# Patient Record
Sex: FEMALE | Race: WHITE | NOT HISPANIC OR LATINO | Employment: OTHER | URBAN - METROPOLITAN AREA
[De-identification: names, ages, dates, MRNs, and addresses within clinical notes are randomized per-mention and may not be internally consistent; named-entity substitution may affect disease eponyms.]

---

## 2017-04-13 ENCOUNTER — ALLSCRIPTS OFFICE VISIT (OUTPATIENT)
Dept: OTHER | Facility: OTHER | Age: 72
End: 2017-04-13

## 2017-04-13 DIAGNOSIS — Z12.31 ENCOUNTER FOR SCREENING MAMMOGRAM FOR MALIGNANT NEOPLASM OF BREAST: ICD-10-CM

## 2017-04-13 DIAGNOSIS — I10 ESSENTIAL (PRIMARY) HYPERTENSION: ICD-10-CM

## 2017-04-13 DIAGNOSIS — E78.5 HYPERLIPIDEMIA: ICD-10-CM

## 2017-04-13 DIAGNOSIS — E55.9 VITAMIN D DEFICIENCY: ICD-10-CM

## 2017-04-13 DIAGNOSIS — E66.9 OBESITY: ICD-10-CM

## 2017-04-13 DIAGNOSIS — E03.9 HYPOTHYROIDISM: ICD-10-CM

## 2017-04-13 DIAGNOSIS — Z13.820 ENCOUNTER FOR SCREENING FOR OSTEOPOROSIS: ICD-10-CM

## 2018-01-11 ENCOUNTER — APPOINTMENT (OUTPATIENT)
Dept: LAB | Facility: MEDICAL CENTER | Age: 73
End: 2018-01-11
Payer: MEDICARE

## 2018-01-11 DIAGNOSIS — E03.9 HYPOTHYROIDISM: ICD-10-CM

## 2018-01-11 DIAGNOSIS — I10 ESSENTIAL (PRIMARY) HYPERTENSION: ICD-10-CM

## 2018-01-11 DIAGNOSIS — E55.9 VITAMIN D DEFICIENCY: ICD-10-CM

## 2018-01-11 DIAGNOSIS — E78.5 HYPERLIPIDEMIA: ICD-10-CM

## 2018-01-11 DIAGNOSIS — E66.9 OBESITY: ICD-10-CM

## 2018-01-11 LAB
25(OH)D3 SERPL-MCNC: 30.8 NG/ML (ref 30–100)
ALBUMIN SERPL BCP-MCNC: 4.3 G/DL (ref 3.5–5)
ALP SERPL-CCNC: 85 U/L (ref 46–116)
ALT SERPL W P-5'-P-CCNC: 18 U/L (ref 12–78)
ANION GAP SERPL CALCULATED.3IONS-SCNC: 6 MMOL/L (ref 4–13)
AST SERPL W P-5'-P-CCNC: 20 U/L (ref 5–45)
BILIRUB SERPL-MCNC: 0.57 MG/DL (ref 0.2–1)
BUN SERPL-MCNC: 15 MG/DL (ref 5–25)
CALCIUM SERPL-MCNC: 9.3 MG/DL (ref 8.3–10.1)
CHLORIDE SERPL-SCNC: 106 MMOL/L (ref 100–108)
CHOLEST SERPL-MCNC: 209 MG/DL (ref 50–200)
CO2 SERPL-SCNC: 26 MMOL/L (ref 21–32)
CREAT SERPL-MCNC: 0.9 MG/DL (ref 0.6–1.3)
GFR SERPL CREATININE-BSD FRML MDRD: 63 ML/MIN/1.73SQ M
GLUCOSE P FAST SERPL-MCNC: 86 MG/DL (ref 65–99)
HDLC SERPL-MCNC: 88 MG/DL (ref 40–60)
LDLC SERPL CALC-MCNC: 109 MG/DL (ref 0–100)
POTASSIUM SERPL-SCNC: 3.9 MMOL/L (ref 3.5–5.3)
PROT SERPL-MCNC: 8.1 G/DL (ref 6.4–8.2)
SODIUM SERPL-SCNC: 138 MMOL/L (ref 136–145)
T4 FREE SERPL-MCNC: 1.21 NG/DL (ref 0.76–1.46)
TRIGL SERPL-MCNC: 62 MG/DL
TSH SERPL DL<=0.05 MIU/L-ACNC: 4.38 UIU/ML (ref 0.36–3.74)

## 2018-01-11 PROCEDURE — 84439 ASSAY OF FREE THYROXINE: CPT

## 2018-01-11 PROCEDURE — 36415 COLL VENOUS BLD VENIPUNCTURE: CPT

## 2018-01-11 PROCEDURE — 80061 LIPID PANEL: CPT

## 2018-01-11 PROCEDURE — 80053 COMPREHEN METABOLIC PANEL: CPT

## 2018-01-11 PROCEDURE — 84443 ASSAY THYROID STIM HORMONE: CPT

## 2018-01-11 PROCEDURE — 82306 VITAMIN D 25 HYDROXY: CPT

## 2018-01-13 VITALS
SYSTOLIC BLOOD PRESSURE: 138 MMHG | OXYGEN SATURATION: 96 % | HEART RATE: 78 BPM | WEIGHT: 174.4 LBS | BODY MASS INDEX: 32.09 KG/M2 | RESPIRATION RATE: 16 BRPM | TEMPERATURE: 97.1 F | DIASTOLIC BLOOD PRESSURE: 82 MMHG | HEIGHT: 62 IN

## 2018-01-13 DIAGNOSIS — E78.5 HYPERLIPIDEMIA: ICD-10-CM

## 2018-01-13 DIAGNOSIS — I10 ESSENTIAL (PRIMARY) HYPERTENSION: ICD-10-CM

## 2018-01-13 DIAGNOSIS — E03.9 HYPOTHYROIDISM: ICD-10-CM

## 2018-01-15 NOTE — RESULT NOTES
Verified Results  * MAMMO SCREENING BILATERAL W CAD [id number] 08:53AM Chelsea Watson     Test Name Result Flag Reference   MAMMO SCREENING BILATERAL W CAD (Report)     Patient History:   Patient is postmenopausal    No known family history of cancer  Benign core biopsy of the left breast  Benign excisional biopsy    of the right breast    Patient's BMI is 33 1  Reason for exam: screening (asymptomatic)  Mammo Screening Bilateral W CAD: March 30, 2016 - Check In #:    [de-identified]   Bilateral CC and MLO view(s) were taken  Technologist: RANCHO Jenkins (R)(M)   Prior study comparison: July 6, 2012, digital bilateral screening   [facility] [address] [facility]  April 27, 2010, digital bilateral screening mammogram, performed at [facility]  There are scattered fibroglandular densities  No dominant soft tissue mass, architectural distortion or    suspicious calcifications are noted  The skin and nipple    structures are within normal limits  Scattered benign appearing    calcifications are noted  No mammographic evidence of malignancy  No    significant changes when compared with prior studies  ASSESSMENT: BiRad:2 - Benign     Recommendation:   Routine screening mammogram of both breasts in 1 year  A    reminder letter will be sent  Analyzed by CAD     8-10% of cancers will be missed on mammography  Management of a    palpable abnormality must be based on clinical grounds  Patients   will be notified of their results via letter from our facility  Accredited by [employer] of [employer] and FDA  [facility] [id number]     Risk Value(s):   Tyrer-Cuzick 10 Year: 3 165%, Tyrer-Cuzick Lifetime: 4 257%,    Myriad Table: 1 5%, BRITTANY 5 Year: 3 1%, NCI Lifetime: 8 1%   Signed by:    Royer Hernandez MD   3/30/16

## 2018-01-22 ENCOUNTER — ALLSCRIPTS OFFICE VISIT (OUTPATIENT)
Dept: OTHER | Facility: OTHER | Age: 73
End: 2018-01-22

## 2018-01-22 DIAGNOSIS — Z12.31 ENCOUNTER FOR SCREENING MAMMOGRAM FOR MALIGNANT NEOPLASM OF BREAST: ICD-10-CM

## 2018-01-23 NOTE — PROGRESS NOTES
Assessment   1  Benign essential HTN (401 1) (I10)   2  Hyperlipidemia (272 4) (E78 5)   3  Hypothyroidism (244 9) (E03 9)    Plan   Benign essential HTN    · Verapamil HCl  MG Oral Tablet Extended Release; Take 1 tablet daily  Benign essential HTN, Hyperlipidemia, Hypothyroidism    · Begin or continue regular aerobic exercise  Gradually work up to at least {count1}    sessions of {dur1} of exercise a week ; Status:Complete;   Done: 66SSR1797   · Eat a low fat and low cholesterol diet ; Status:Complete;   Done: 57UDN8435   · We recommend that you bring your body mass index down to 26 ; Status:Complete;      Done: 37MIX8788   · (1) COMPREHENSIVE METABOLIC PANEL; Status:Active; Requested DPY:[id number];    · (1) LIPID PANEL, FASTING; Status:Active; Requested VRC:[id number];    · Follow-up visit in 6 months Evaluation and Treatment  Follow-up  Status: Complete     Done: 11VPS7639  Encounter for screening mammogram for malignant neoplasm of breast    · * MAMMO SCREENING BILATERAL W CAD; Status:Active; Requested for:22Jan2018;    · * MAMMO SCREENING BILATERAL W CAD; Status:Canceled; Hyperlipidemia    · Simvastatin 10 MG Oral Tablet; TAKE 1 BY MOUTH DAILY      --[address]  Screening for osteoporosis, Vitamin D deficiency    · * DXA BONE DENSITY SPINE HIP AND PELVIS; Status:Permanent Deferral - pt  declines; Discussion/Summary      Hypertension is at goal patient to continue her verapamil  Lipids are at goal patient to continue her simvastatin and low-fat diet  Exercise and weight loss encouraged  Patient has hypothyroidism not on any medication will continue to monitor TSH and T4  Prevnar 13 given today mammogram ordered  Routine eye exam recommended  Patient to follow up in 6 months labs ordered  Possible side effects of new medications were reviewed with the patient/guardian today  The treatment plan was reviewed with the patient/guardian   The patient/guardian understands and agrees with the treatment plan      Chief Complaint   blood work follow up    Patient is here today for follow up of chronic conditions described in HPI  History of Present Illness   Patient presents for follow up chronic conditions  Patient has hyperlipidemia on statin therapy  Patient has hypertension on verapamil   Patient has hypothyroidism not on any medication  Labs reviewed with patient show vitamin-D at 30 8  CMP is normal  TSH slightly elevated at 4 3  T4 is normal  Total cholesterol is 209 HDL 88  triglycerides 62  pt declines screenings of mammogram and dexa  pt over due for routine eye exam  pt will do prevnar 13 vaccine  pt stes she had flu vaccine at pharmacy      Review of Systems        Constitutional: no recent weight gain-- and-- no recent weight loss  Eyes: no eyesight problems  Cardiovascular: no chest pain,-- no palpitations-- and-- no lower extremity edema  Respiratory: no cough-- and-- no shortness of breath during exertion  Gastrointestinal: no abdominal pain,-- no constipation-- and-- no diarrhea  Genitourinary: no dysuria  Musculoskeletal: no arthralgias-- and-- no myalgias  Integumentary: no rashes  Neurological: no headache-- and-- no dizziness  Active Problems   1  Benign essential HTN (401 1) (I10)   2  Encounter for screening mammogram for malignant neoplasm of breast (V76 12)     (Z12 31)   3  Glaucoma screening (V80 1) (Z13 5)   4  Hyperlipidemia (272 4) (E78 5)   5  Hypothyroidism (244 9) (E03 9)   6  Initial Medicare annual wellness visit (V70 0) (Z00 00)   7  Medicare annual wellness visit, subsequent (V70 0) (Z00 00)   8  Need for prophylactic vaccination and inoculation against influenza (V04 81) (Z23)   9  Obesity (278 00) (E66 9)   10  Screening for genitourinary condition (V81 6) (Z13 89)   11  Screening for neurological condition (V80 09) (Z13 89)   12  Screening for osteoporosis (V82 81) (Z13 820)   13   Vitamin D deficiency (268 9) (E55 9)    Past Medical History   1  History of Achalasia (530 0) (K22 0)   2  History of Arthritis Of Hip (716 95)   3  History of Arthritis Of Knee (716 96)   4  History of Candidiasis (112 9) (B37 9)   5  History of Denial of substance abuse   6  History of Dermatitis, dyshidrotic (705 81) (L30 1)   7  History of Facial cellulitis (682 0) (L03 211)   8  History of acute conjunctivitis (V12 49) (Z86 69)   9  History of acute sinusitis (V12 69) (Z87 09)   10  Denied: History of mental disorder   11  History of Impetigo (684) (L01 00)   12  History of Menopause (V49 81)     The active problems and past medical history were reviewed and updated today  Surgical History   1  History of Biopsy Breast Open   2  History of Cataract Surgery   3  History of Hip Replacement   4  History of Knee Replacement   5  History of Tonsillectomy    Family History   Family History    1  Denied: Family history of Denial of substance abuse   2  Family history of Heart Disease (V17 49)   3  Denied: No family history of mental disorder    Social History    · Daily Coffee Consumption (___ Cups/Day)   · Daily Tea Consumption (___ Cups/Day)   · Exercise: Walking   ·    · Never A Smoker   · Stopped Drinking Alcohol   · Uses Safety Equipment - Seatbelts    Current Meds    1  Simvastatin 10 MG Oral Tablet; TAKE ONE TABLET BY MOUTH ONE TIME DAILY IN THE     EVENING; Therapy: 71Qeq1244 to (Evaluate:57Hua0724)  Requested for: 58Hpy7097; Last     Rx:57Tmr8786 Ordered   2  Verapamil HCl  MG Oral Tablet Extended Release; Take 1 tablet daily; Therapy: 09ETP5609 to (Last XL:14MQL9123)  Requested for: 96URJ2395 Ordered   3  Vitamin D 1000 UNIT CAPS; Therapy: (Recorded:42Nmy2313) to Recorded     The medication list was reviewed and updated today  Allergies   1  Aleve TABS   2  Diflucan TABS   3  EPINEPHrine LEONIDES   4   Sulfa Drugs    Vitals   Vital Signs    Recorded: 50GOW8652 10:55AM   Temperature 97 8 F, Tympanic Heart Rate 70, L Brachial Artery   Pulse Quality Normal, L Brachial Artery   Respiration Quality Normal   Respiration 16   Systolic 611, LUE, Sitting   Diastolic 82, LUE, Sitting   Height 5 ft 1 5 in   Weight 174 lb 12 8 oz   BMI Calculated 32 49   BSA Calculated 1 79   O2 Saturation 97     Physical Exam        Constitutional      General appearance: No acute distress, well appearing and well nourished  appears healthy-- and-- overweight  Head and Face      Head and face: Normal        Eyes      Conjunctiva and lids: No swelling, erythema or discharge  Pupils and irises: Equal, round, reactive to light  Ears, Nose, Mouth, and Throat      External inspection of ears and nose: Normal        Otoscopic examination: Tympanic membranes translucent with normal light reflex  Canals patent without erythema  Oropharynx: Normal with no erythema, edema, exudate or lesions  Neck      Neck: Supple, symmetric, trachea midline, no masses  Thyroid: Normal, no thyromegaly  Pulmonary      Respiratory effort: No increased work of breathing or signs of respiratory distress  Auscultation of lungs: Clear to auscultation  Cardiovascular      Auscultation of heart: Normal rate and rhythm, normal S1 and S2, no murmurs  Carotid pulses: 2+ bilaterally  Examination of extremities for edema and/or varicosities: Normal        Lymphatic      Palpation of lymph nodes in neck: No lymphadenopathy  Musculoskeletal      Digits and nails: Normal without clubbing or cyanosis  Examination of the extremities revealed no fingernail clubbing-- and-- well perfused fingers  Skin      Skin and subcutaneous tissue: Normal without rashes or lesions         Psychiatric      Judgment and insight: Normal        Mood and affect: Normal        Signatures    Electronically signed by : ENID Richardson; Jan 22 2018 11:31AM EST                       (Author)     Electronically signed by : NEO Tejeda ; Jan 22 2018 12:22PM EST                       (Author)

## 2018-09-06 DIAGNOSIS — E78.5 HYPERLIPIDEMIA, UNSPECIFIED HYPERLIPIDEMIA TYPE: Primary | ICD-10-CM

## 2018-09-07 RX ORDER — SIMVASTATIN 10 MG
TABLET ORAL
Qty: 90 TABLET | Refills: 0 | Status: SHIPPED | OUTPATIENT
Start: 2018-09-07 | End: 2019-01-08 | Stop reason: SDUPTHER

## 2019-01-07 PROBLEM — Z00.00 MEDICARE ANNUAL WELLNESS VISIT, SUBSEQUENT: Status: ACTIVE | Noted: 2019-01-07

## 2019-01-07 RX ORDER — BIOTIN 1 MG
2000 TABLET ORAL
COMMUNITY

## 2019-01-08 ENCOUNTER — OFFICE VISIT (OUTPATIENT)
Dept: FAMILY MEDICINE CLINIC | Facility: CLINIC | Age: 74
End: 2019-01-08
Payer: MEDICARE

## 2019-01-08 VITALS
BODY MASS INDEX: 30.41 KG/M2 | WEIGHT: 171.6 LBS | HEIGHT: 63 IN | HEART RATE: 78 BPM | TEMPERATURE: 97.9 F | DIASTOLIC BLOOD PRESSURE: 72 MMHG | OXYGEN SATURATION: 97 % | SYSTOLIC BLOOD PRESSURE: 120 MMHG

## 2019-01-08 DIAGNOSIS — Z12.39 SCREENING FOR BREAST CANCER: ICD-10-CM

## 2019-01-08 DIAGNOSIS — E78.5 HYPERLIPIDEMIA, UNSPECIFIED HYPERLIPIDEMIA TYPE: ICD-10-CM

## 2019-01-08 DIAGNOSIS — E03.9 HYPOTHYROIDISM, UNSPECIFIED TYPE: ICD-10-CM

## 2019-01-08 DIAGNOSIS — I10 BENIGN ESSENTIAL HTN: ICD-10-CM

## 2019-01-08 DIAGNOSIS — Z00.00 MEDICARE ANNUAL WELLNESS VISIT, SUBSEQUENT: Primary | ICD-10-CM

## 2019-01-08 PROCEDURE — 99214 OFFICE O/P EST MOD 30 MIN: CPT | Performed by: PHYSICIAN ASSISTANT

## 2019-01-08 PROCEDURE — G0439 PPPS, SUBSEQ VISIT: HCPCS | Performed by: PHYSICIAN ASSISTANT

## 2019-01-08 RX ORDER — SIMVASTATIN 10 MG
10 TABLET ORAL DAILY
Qty: 90 TABLET | Refills: 0 | Status: SHIPPED | OUTPATIENT
Start: 2019-01-08 | End: 2019-04-04 | Stop reason: SDUPTHER

## 2019-01-08 NOTE — PROGRESS NOTES
Assessment/Plan:     Diagnoses and all orders for this visit:    Medicare annual wellness visit, subsequent    Benign essential HTN  Comments:  Blood pressure is at goal continue verapamil   Orders:  -     Comprehensive metabolic panel    Hyperlipidemia, unspecified hyperlipidemia type  Comments:  Continue low-fat diet and statin therapy  Proceed with labs  Orders:  -     Lipid panel  -     simvastatin (ZOCOR) 10 mg tablet; Take 1 tablet (10 mg total) by mouth daily    Hypothyroidism, unspecified type  Comments:  Elevated TSH last year  Not on any medication recheck thyroid function  Orders:  -     TSH, 3rd generation  -     T4    Screening for breast cancer  -     Mammo screening bilateral w cad; Future    Other orders  -     Cholecalciferol (VITAMIN D3) 1000 units CAPS; Take by mouth          Subjective:      Patient ID: Donnell Nath is a 74 y o  female  Patient presents for follow up chronic conditions  Patient has hyperlipidemia she is on simvastatin 10 mg  Patient also has hypertension she is on verapamil 180 extended release once a day  Flu vaccine declined today  Discussed new shingles vaccine  Patient due for mammogram DEXA scan and routine lab work  The following portions of the patient's history were reviewed and updated as appropriate:   She  has a past medical history of Hypertension    She   Patient Active Problem List    Diagnosis Date Noted    Screening for breast cancer 01/08/2019    Medicare annual wellness visit, subsequent 01/07/2019    Hypothyroidism 01/27/2014    Obesity 11/04/2013    Benign essential HTN 06/18/2012    Hyperlipidemia 06/18/2012    Vitamin D deficiency 06/18/2012     Current Outpatient Prescriptions   Medication Sig Dispense Refill    Cholecalciferol (VITAMIN D3) 1000 units CAPS Take by mouth      simvastatin (ZOCOR) 10 mg tablet Take 1 tablet (10 mg total) by mouth daily 90 tablet 0    verapamil (COVERA HS) 180 MG (CO) 24 hr tablet Verapamil HCl  MG Oral Tablet Extended Release  Take 1 tablet daily   Quantity: 90;  Refills: 0       [facility];  Started 20-Dec-2011  Active       No current facility-administered medications for this visit  Current Outpatient Prescriptions on File Prior to Visit   Medication Sig    verapamil (COVERA HS) 180 MG (CO) 24 hr tablet Verapamil HCl  MG Oral Tablet Extended Release  Take 1 tablet daily   Quantity: 90;  Refills: 0       [facility];  Started 20-Dec-2011  Active    [DISCONTINUED] simvastatin (ZOCOR) 10 mg tablet TAKE 1 TABLET DAILY     No current facility-administered medications on file prior to visit  She is allergic to epinephrine; fluconazole; naproxen; shellfish-derived products; and sulfa antibiotics       Review of Systems   Constitutional: Negative for activity change, appetite change and unexpected weight change  HENT: Negative for ear pain and sore throat  Eyes: Negative for visual disturbance  Respiratory: Negative for cough, shortness of breath and wheezing  Cardiovascular: Negative for chest pain and leg swelling  Gastrointestinal: Negative for abdominal pain, blood in stool, constipation, diarrhea, nausea and vomiting  Genitourinary: Negative for difficulty urinating  Musculoskeletal: Negative for arthralgias and myalgias  Skin: Negative for rash  Neurological: Negative for dizziness, syncope, light-headedness and headaches  Psychiatric/Behavioral: Negative for self-injury, sleep disturbance and suicidal ideas  The patient is not nervous/anxious  Objective:        Physical Exam   Constitutional: She is oriented to person, place, and time  She appears well-developed and well-nourished  No distress  HENT:   Head: Normocephalic and atraumatic     Right Ear: Tympanic membrane, external ear and ear canal normal    Left Ear: Tympanic membrane, external ear and ear canal normal    Mouth/Throat: Oropharynx is clear and moist  No oropharyngeal exudate or posterior oropharyngeal erythema  Eyes: Pupils are equal, round, and reactive to light  Conjunctivae are normal    Neck: Normal range of motion  Neck supple  Carotid bruit is not present  No thyromegaly present  Cardiovascular: Normal rate and regular rhythm  Exam reveals no gallop and no friction rub  No murmur heard  Pulmonary/Chest: Effort normal and breath sounds normal  No respiratory distress  She has no wheezes  Abdominal: Soft  Bowel sounds are normal  She exhibits no distension and no mass  There is no tenderness  Musculoskeletal: Normal range of motion  She exhibits no edema  Lymphadenopathy:     She has no cervical adenopathy  Neurological: She is alert and oriented to person, place, and time  Skin: Skin is warm and dry  No rash noted  She is not diaphoretic  No erythema  Psychiatric: She has a normal mood and affect  Her behavior is normal  Judgment and thought content normal    Nursing note and vitals reviewed

## 2019-01-08 NOTE — PROGRESS NOTES
Assessment and Plan:    Problem List Items Addressed This Visit     None        Health Maintenance Due   Topic Date Due    Depression Screening PHQ  1945    Medicare Annual Wellness Visit (AWV)  1945    DXA SCAN  1945    DTaP,Tdap,and Td Vaccines (1 - Tdap) 06/29/1964    Fall Risk  06/29/2008    Urinary Incontinence Screening  06/29/2008    INFLUENZA VACCINE  07/01/2018         HPI:  Lashawn Vega is a 74 y o  female here for her Subsequent Wellness Visit  Patient Active Problem List   Diagnosis    Benign essential HTN    Hyperlipidemia    Hypothyroidism    Obesity    Vitamin D deficiency    Medicare annual wellness visit, subsequent     Past Medical History:   Diagnosis Date    Hypertension      Past Surgical History:   Procedure Laterality Date    TOTAL HIP ARTHROPLASTY Bilateral     TOTAL KNEE ARTHROPLASTY Right      No family history on file  History   Smoking Status    Unknown If Ever Smoked   Smokeless Tobacco    Not on file     History   Alcohol Use No      History   Drug Use No       Current Outpatient Prescriptions   Medication Sig Dispense Refill    Cholecalciferol (VITAMIN D3) 1000 units CAPS Take by mouth      simvastatin (ZOCOR) 10 mg tablet TAKE 1 TABLET DAILY 90 tablet 0    verapamil (COVERA HS) 180 MG (CO) 24 hr tablet Verapamil HCl  MG Oral Tablet Extended Release  Take 1 tablet daily   Quantity: 90;  Refills: 0       [facility];  Started 20-Dec-2011  Active       No current facility-administered medications for this visit        Allergies   Allergen Reactions    Epinephrine     Fluconazole     Naproxen     Shellfish-Derived Products     Sulfa Antibiotics      Immunization History   Administered Date(s) Administered    Influenza Split High Dose Preservative Free IM 11/04/2013    Influenza TIV (IM) 11/10/2014, 02/02/2016    Pneumococcal Conjugate 13-Valent 01/22/2018    Pneumococcal Polysaccharide PPV23 11/10/2014       Patient Care Team:  Paulino Bhardwaj PA-C as PCP - General (Family Medicine)  MD Paulino West PA-C Ron Prows, MD    Medicare Screening Tests and Risk Assessments:  Rosemarie Lai is here for her Subsequent Wellness visit  Health Risk Assessment:  Patient rates overall health as very good  Patient feels that their physical health rating is Same  Eyesight was rated as Same  Hearing was rated as Same  Patient feels that their emotional and mental health rating is Same  Pain experienced by patient in the last 7 days has been None  Patient states that she has experienced no weight loss or gain in last 6 months  Emotional/Mental Health:  Patient has been feeling nervous/anxious  PHQ-9 Depression Screening:    Frequency of the following problems over the past two weeks:      1  Little interest or pleasure in doing things: 0 - not at all      2  Feeling down, depressed, or hopeless: 0 - not at all  PHQ-2 Score: 0          Broken Bones/Falls: Fall Risk Assessment:    In the past year, patient has experienced: No history of falling in past year          Bladder/Bowel:  Patient has not leaked urine accidently in the last six months  Patient reports no loss of bowel control  Immunizations:  Patient has not had a flu vaccination within the last year  Patient has received a pneumonia shot  Patient has not received a shingles shot  Home Safety:  Patient does not have trouble with stairs inside or outside of their home  Patient currently reports that there are no safety hazards present in home, working smoke alarms, working carbon monoxide detectors  Preventative Screenings:   No breast cancer screening performed, colon cancer screen completed, no cholesterol screen completed, glaucoma eye exam completed,     Nutrition:  Current diet: Regular with servings of the following:    Medications:  Patient is currently taking over-the-counter supplements  Patient is able to manage medications  Lifestyle Choices:  Patient reports no tobacco use  Patient has not smoked or used tobacco in the past   Patient reports no alcohol use  Patient drives a vehicle  Patient wears seat belt  Activities of Daily Living:  Can get out of bed by his or her self, able to dress self, able to make own meals, able to do own shopping, able to bathe self, can do own laundry/housekeeping, can manage own money, pay bills and track expenses    Previous Hospitalizations:  No hospitalization or ED visit in past 12 months        Advanced Directives:  Patient has decided on a power of   Patient has spoken to designated power of   Patient has completed advanced directive  Preventative Screening/Counseling:      Cardiovascular:     Due for Labs/Analytes/Optional EKG: Lipid Panel          Diabetes:      Due for labs: Blood Glucose          Colorectal Cancer:      General: Patient Declines          Cervical Cancer:      General: Screening Not Indicated          Osteoporosis:      Counseling: Calcium and Vitamin D Intake and Regular Weightbearing Exercise          AAA:      General: Screening Not Indicated          Glaucoma:      General: Screening Current          HIV:      General: Screening Not Indicated          Advanced Directives:   Patient has living will for healthcare, has durable POA for healthcare, patient has an advanced directive       Immunizations:      Influenza: Patient Declines      Pneumococcal: Lifetime Vaccine Completed      Shingrix: Risks & Benefits Discussed

## 2019-01-31 ENCOUNTER — OFFICE VISIT (OUTPATIENT)
Dept: FAMILY MEDICINE CLINIC | Facility: CLINIC | Age: 74
End: 2019-01-31
Payer: MEDICARE

## 2019-01-31 VITALS
BODY MASS INDEX: 30.16 KG/M2 | HEIGHT: 63 IN | TEMPERATURE: 97.8 F | OXYGEN SATURATION: 96 % | WEIGHT: 170.2 LBS | HEART RATE: 74 BPM | SYSTOLIC BLOOD PRESSURE: 130 MMHG | DIASTOLIC BLOOD PRESSURE: 80 MMHG

## 2019-01-31 DIAGNOSIS — B02.9 HERPES ZOSTER WITHOUT COMPLICATION: Primary | ICD-10-CM

## 2019-01-31 PROCEDURE — 99213 OFFICE O/P EST LOW 20 MIN: CPT | Performed by: PHYSICIAN ASSISTANT

## 2019-01-31 RX ORDER — GABAPENTIN 100 MG/1
100 CAPSULE ORAL 2 TIMES DAILY
Qty: 60 CAPSULE | Refills: 0 | Status: SHIPPED | OUTPATIENT
Start: 2019-01-31 | End: 2019-07-08 | Stop reason: ALTCHOICE

## 2019-01-31 RX ORDER — VALACYCLOVIR HYDROCHLORIDE 1 G/1
1000 TABLET, FILM COATED ORAL 2 TIMES DAILY
Qty: 20 TABLET | Refills: 0 | Status: SHIPPED | OUTPATIENT
Start: 2019-01-31 | End: 2019-07-08 | Stop reason: ALTCHOICE

## 2019-01-31 NOTE — PROGRESS NOTES
Assessment/Plan:     Diagnoses and all orders for this visit:    Herpes zoster without complication  Comments:  Keep area clean and dry  Valtrex and gabapentin ordered  Follow up if persists or worsens  Orders:  -     valACYclovir (VALTREX) 1,000 mg tablet; Take 1 tablet (1,000 mg total) by mouth 2 (two) times a day for 10 days  -     gabapentin (NEURONTIN) 100 mg capsule; Take 1 capsule (100 mg total) by mouth 2 (two) times a day          Subjective:      Patient ID: Mike Connell is a 74 y o  female  Patient presents with the sick U rash started 3 days ago on her right lower back  Rash is spreading to her right lower abdomen  Patient denies fever or pain  The following portions of the patient's history were reviewed and updated as appropriate:   She  has a past medical history of Hypertension  She   Patient Active Problem List    Diagnosis Date Noted    Herpes zoster without complication 70/81/1969    Screening for breast cancer 01/08/2019    Medicare annual wellness visit, subsequent 01/07/2019    Hypothyroidism 01/27/2014    Obesity 11/04/2013    Benign essential HTN 06/18/2012    Hyperlipidemia 06/18/2012    Vitamin D deficiency 06/18/2012     Current Outpatient Prescriptions   Medication Sig Dispense Refill    Cholecalciferol (VITAMIN D3) 1000 units CAPS Take by mouth      gabapentin (NEURONTIN) 100 mg capsule Take 1 capsule (100 mg total) by mouth 2 (two) times a day 60 capsule 0    simvastatin (ZOCOR) 10 mg tablet Take 1 tablet (10 mg total) by mouth daily 90 tablet 0    valACYclovir (VALTREX) 1,000 mg tablet Take 1 tablet (1,000 mg total) by mouth 2 (two) times a day for 10 days 20 tablet 0    verapamil (COVERA HS) 180 MG (CO) 24 hr tablet Verapamil HCl  MG Oral Tablet Extended Release  Take 1 tablet daily   Quantity: 90;  Refills: 0       [facility];  Started 20-Dec-2011  Active       No current facility-administered medications for this visit        Current Outpatient Prescriptions on File Prior to Visit   Medication Sig    Cholecalciferol (VITAMIN D3) 1000 units CAPS Take by mouth    simvastatin (ZOCOR) 10 mg tablet Take 1 tablet (10 mg total) by mouth daily    verapamil (COVERA HS) 180 MG (CO) 24 hr tablet Verapamil HCl  MG Oral Tablet Extended Release  Take 1 tablet daily   Quantity: 90;  Refills: 0       [facility];  Started 20-Dec-2011  Active     No current facility-administered medications on file prior to visit  She is allergic to epinephrine; fluconazole; naproxen; shellfish-derived products; and sulfa antibiotics       Review of Systems   Skin: Positive for rash  Objective:        Physical Exam   Skin: Skin is warm and dry  Vesicular rash along right thoracic dermatome    Rash extends to the right and clusters of vesicles patches radiating around to the anterior abdomen

## 2019-04-04 DIAGNOSIS — E78.5 HYPERLIPIDEMIA, UNSPECIFIED HYPERLIPIDEMIA TYPE: ICD-10-CM

## 2019-04-05 RX ORDER — SIMVASTATIN 10 MG
TABLET ORAL
Qty: 90 TABLET | Refills: 0 | Status: SHIPPED | OUTPATIENT
Start: 2019-04-05 | End: 2019-07-08 | Stop reason: SDUPTHER

## 2019-05-07 DIAGNOSIS — I10 BENIGN ESSENTIAL HTN: Primary | ICD-10-CM

## 2019-07-08 ENCOUNTER — OFFICE VISIT (OUTPATIENT)
Dept: FAMILY MEDICINE CLINIC | Facility: CLINIC | Age: 74
End: 2019-07-08
Payer: MEDICARE

## 2019-07-08 VITALS
WEIGHT: 166.6 LBS | DIASTOLIC BLOOD PRESSURE: 78 MMHG | HEART RATE: 70 BPM | OXYGEN SATURATION: 96 % | HEIGHT: 63 IN | RESPIRATION RATE: 16 BRPM | BODY MASS INDEX: 29.52 KG/M2 | SYSTOLIC BLOOD PRESSURE: 120 MMHG | TEMPERATURE: 97.9 F

## 2019-07-08 DIAGNOSIS — E78.5 HYPERLIPIDEMIA, UNSPECIFIED HYPERLIPIDEMIA TYPE: ICD-10-CM

## 2019-07-08 DIAGNOSIS — Z13.820 SCREENING FOR OSTEOPOROSIS: ICD-10-CM

## 2019-07-08 DIAGNOSIS — I10 BENIGN ESSENTIAL HTN: ICD-10-CM

## 2019-07-08 DIAGNOSIS — E03.9 HYPOTHYROIDISM, UNSPECIFIED TYPE: ICD-10-CM

## 2019-07-08 DIAGNOSIS — I10 BENIGN ESSENTIAL HTN: Primary | ICD-10-CM

## 2019-07-08 PROBLEM — B02.9 HERPES ZOSTER WITHOUT COMPLICATION: Status: RESOLVED | Noted: 2019-01-31 | Resolved: 2019-07-08

## 2019-07-08 PROCEDURE — 99214 OFFICE O/P EST MOD 30 MIN: CPT | Performed by: PHYSICIAN ASSISTANT

## 2019-07-08 RX ORDER — SIMVASTATIN 10 MG
10 TABLET ORAL DAILY
Qty: 90 TABLET | Refills: 1 | Status: SHIPPED | OUTPATIENT
Start: 2019-07-08 | End: 2020-01-09 | Stop reason: SDUPTHER

## 2019-07-08 NOTE — PROGRESS NOTES
BMI Counseling: Body mass index is 29 51 kg/m²  Discussed the patient's BMI with her  The BMI is above average  BMI counseling and education was provided to the patient  Nutrition recommendations include reducing portion sizes and moderation in carbohydrate intake  Exercise recommendations include exercising 3-5 times per week  Assessment/Plan:     Diagnoses and all orders for this visit:    Benign essential HTN  Comments:  Blood pressure is at goal we will continue current regimen  Orders:  -     Comprehensive metabolic panel  -     verapamil (CALAN-SR) 180 mg CR tablet; Take 1 tablet (180 mg total) by mouth daily    Hyperlipidemia, unspecified hyperlipidemia type  Comments:  Continue statin therapy and low-fat diet  Patient instructed to add exercise to her current regimen  Orders:  -     Lipid panel  -     simvastatin (ZOCOR) 10 mg tablet; Take 1 tablet (10 mg total) by mouth daily    Hypothyroidism, unspecified type  Comments:  History of elevated TSH no current medications will check labs  Orders:  -     TSH, 3rd generation  -     T4  -     T3    Screening for osteoporosis  Comments:  Continue calcium vitamin-D and weight-bearing exercise  DEXA scan ordered  This may not be a covered service with patient's insurance  Orders:  -     DXA bone density spine hip and pelvis; Future    Hyperlipidemia, unspecified hyperlipidemia type  Comments:  Continue low-fat diet and statin therapy  Proceed with labs  Orders:  -     Lipid panel  -     simvastatin (ZOCOR) 10 mg tablet; Take 1 tablet (10 mg total) by mouth daily    Benign essential HTN  -     Comprehensive metabolic panel  -     verapamil (CALAN-SR) 180 mg CR tablet; Take 1 tablet (180 mg total) by mouth daily          Subjective:      Patient ID: Sohail Means is a 66 y o  female  Patient presents for follow up chronic conditions  Patient has a history of hypothyroidism she is not currently on any medications  We watch her TSH levels    For hypertension the patient is on verapamil 180 Sr  For lipids patient is on simvastatin 10 mg  Discussed low carb low sugar diet and adding exercise to regimen  Patient's BMI is 29  She has lost 4 lb since her last visit  Patient is also due for routine labs  The following portions of the patient's history were reviewed and updated as appropriate:   She   Patient Active Problem List    Diagnosis Date Noted    Screening for breast cancer 01/08/2019    Medicare annual wellness visit, subsequent 01/07/2019    Hypothyroidism 01/27/2014    Obesity 11/04/2013    Benign essential HTN 06/18/2012    Hyperlipidemia 06/18/2012    Vitamin D deficiency 06/18/2012     Current Outpatient Medications   Medication Sig Dispense Refill    Cholecalciferol (VITAMIN D3) 1000 units CAPS Take by mouth      simvastatin (ZOCOR) 10 mg tablet Take 1 tablet (10 mg total) by mouth daily 90 tablet 1    verapamil (CALAN-SR) 180 mg CR tablet Take 1 tablet (180 mg total) by mouth daily 90 tablet 1     No current facility-administered medications for this visit  Current Outpatient Medications on File Prior to Visit   Medication Sig    Cholecalciferol (VITAMIN D3) 1000 units CAPS Take by mouth    [DISCONTINUED] simvastatin (ZOCOR) 10 mg tablet TAKE 1 TABLET BY MOUTH EVERY DAY    [DISCONTINUED] verapamil (CALAN-SR) 180 mg CR tablet Take 1 tablet (180 mg total) by mouth daily    [DISCONTINUED] gabapentin (NEURONTIN) 100 mg capsule Take 1 capsule (100 mg total) by mouth 2 (two) times a day (Patient not taking: Reported on 7/8/2019)    [DISCONTINUED] valACYclovir (VALTREX) 1,000 mg tablet Take 1 tablet (1,000 mg total) by mouth 2 (two) times a day for 10 days     No current facility-administered medications on file prior to visit  She is allergic to epinephrine; fluconazole; naproxen; shellfish-derived products; and sulfa antibiotics       Review of Systems   Constitutional: Negative for activity change, appetite change and unexpected weight change  HENT: Negative for ear pain and sore throat  Eyes: Negative for visual disturbance  Respiratory: Negative for cough, shortness of breath and wheezing  Cardiovascular: Negative for chest pain and leg swelling  Gastrointestinal: Negative for abdominal pain, blood in stool, constipation, diarrhea, nausea and vomiting  Genitourinary: Negative for difficulty urinating  Musculoskeletal: Negative for arthralgias and myalgias  Skin: Negative for rash  Neurological: Negative for dizziness, syncope, light-headedness and headaches  Psychiatric/Behavioral: Negative for self-injury, sleep disturbance and suicidal ideas  The patient is not nervous/anxious  Objective:        Physical Exam   Constitutional: She is oriented to person, place, and time  She appears well-developed and well-nourished  No distress  HENT:   Head: Normocephalic and atraumatic  Right Ear: Tympanic membrane, external ear and ear canal normal    Left Ear: Tympanic membrane, external ear and ear canal normal    Mouth/Throat: Oropharynx is clear and moist  No oropharyngeal exudate or posterior oropharyngeal erythema  Eyes: Pupils are equal, round, and reactive to light  Conjunctivae are normal    Neck: Neck supple  Carotid bruit is not present  No thyromegaly present  Cardiovascular: Normal rate, regular rhythm and normal heart sounds  Exam reveals no gallop and no friction rub  No murmur heard  Pulmonary/Chest: Effort normal and breath sounds normal  No respiratory distress  She has no wheezes  Abdominal: Soft  Bowel sounds are normal  She exhibits no distension and no mass  There is no tenderness  Musculoskeletal: Normal range of motion  She exhibits no edema  Lymphadenopathy:     She has no cervical adenopathy  Neurological: She is alert and oriented to person, place, and time  Skin: Skin is warm and dry  No rash noted  She is not diaphoretic  No erythema     Psychiatric: She has a normal mood and affect  Her behavior is normal  Judgment and thought content normal    Nursing note and vitals reviewed

## 2019-07-12 DIAGNOSIS — E78.5 HYPERLIPIDEMIA, UNSPECIFIED HYPERLIPIDEMIA TYPE: ICD-10-CM

## 2019-07-12 RX ORDER — SIMVASTATIN 10 MG
TABLET ORAL
Qty: 90 TABLET | Refills: 0 | Status: SHIPPED | OUTPATIENT
Start: 2019-07-12 | End: 2020-01-09

## 2020-01-09 ENCOUNTER — OFFICE VISIT (OUTPATIENT)
Dept: FAMILY MEDICINE CLINIC | Facility: CLINIC | Age: 75
End: 2020-01-09
Payer: MEDICARE

## 2020-01-09 VITALS
BODY MASS INDEX: 31.01 KG/M2 | DIASTOLIC BLOOD PRESSURE: 78 MMHG | HEIGHT: 63 IN | OXYGEN SATURATION: 97 % | RESPIRATION RATE: 16 BRPM | HEART RATE: 69 BPM | TEMPERATURE: 97.5 F | SYSTOLIC BLOOD PRESSURE: 120 MMHG | WEIGHT: 175 LBS

## 2020-01-09 DIAGNOSIS — E78.5 HYPERLIPIDEMIA, UNSPECIFIED HYPERLIPIDEMIA TYPE: ICD-10-CM

## 2020-01-09 DIAGNOSIS — Z00.00 MEDICARE ANNUAL WELLNESS VISIT, SUBSEQUENT: Primary | ICD-10-CM

## 2020-01-09 DIAGNOSIS — I10 BENIGN ESSENTIAL HTN: ICD-10-CM

## 2020-01-09 DIAGNOSIS — E03.9 HYPOTHYROIDISM, UNSPECIFIED TYPE: ICD-10-CM

## 2020-01-09 DIAGNOSIS — Z12.39 SCREENING FOR BREAST CANCER: ICD-10-CM

## 2020-01-09 DIAGNOSIS — E66.09 CLASS 1 OBESITY DUE TO EXCESS CALORIES WITHOUT SERIOUS COMORBIDITY WITH BODY MASS INDEX (BMI) OF 31.0 TO 31.9 IN ADULT: ICD-10-CM

## 2020-01-09 DIAGNOSIS — Z23 ENCOUNTER FOR IMMUNIZATION: ICD-10-CM

## 2020-01-09 PROCEDURE — 99214 OFFICE O/P EST MOD 30 MIN: CPT | Performed by: PHYSICIAN ASSISTANT

## 2020-01-09 PROCEDURE — 90662 IIV NO PRSV INCREASED AG IM: CPT

## 2020-01-09 PROCEDURE — G0008 ADMIN INFLUENZA VIRUS VAC: HCPCS

## 2020-01-09 PROCEDURE — G0439 PPPS, SUBSEQ VISIT: HCPCS | Performed by: PHYSICIAN ASSISTANT

## 2020-01-09 PROCEDURE — 1123F ACP DISCUSS/DSCN MKR DOCD: CPT

## 2020-01-09 RX ORDER — SIMVASTATIN 10 MG
10 TABLET ORAL DAILY
Qty: 90 TABLET | Refills: 1 | Status: SHIPPED | OUTPATIENT
Start: 2020-01-09 | End: 2020-03-18

## 2020-01-09 NOTE — PROGRESS NOTES
Assessment/Plan:     Diagnoses and all orders for this visit:    Medicare annual wellness visit, subsequent    Hypothyroidism, unspecified type  Comments:  History of elevated TSH  No current meds  Check labs  Orders:  -     TSH, 3rd generation    Benign essential HTN  Comments:  Blood pressure is at goal continue verapamil   Orders:  -     Comprehensive metabolic panel  -     verapamil (CALAN-SR) 180 mg CR tablet; Take 1 tablet (180 mg total) by mouth daily    Hyperlipidemia, unspecified hyperlipidemia type  Comments:  Continue statin therapy and low-fat diet  Orders:  -     Lipid panel  -     simvastatin (ZOCOR) 10 mg tablet; Take 1 tablet (10 mg total) by mouth daily    Encounter for immunization  -     influenza vaccine, [id number], high-dose, PF 0 5 mL (FLUZONE HIGH-DOSE)    Class 1 obesity due to excess calories without serious comorbidity with body mass index (BMI) of 31 0 to 31 9 in adult  Comments:  Exercise encouraged to promote weight loss    Screening for breast cancer  -     Mammo screening bilateral w cad; Future    Hyperlipidemia, unspecified hyperlipidemia type  Comments:  Continue low-fat diet and statin therapy  Proceed with labs  Orders:  -     Lipid panel  -     simvastatin (ZOCOR) 10 mg tablet; Take 1 tablet (10 mg total) by mouth daily          Subjective:      Patient ID: Antonieta Mckeon is a 66 y o  female  Patient presents for follow up chronic conditions  Patient has hypertension she is on verapamil  mg  Her blood pressure is well controlled  He is also on simvastatin 10 mg for lipid control  Patient is overdue for labs  No lab since 2018  Patient has agreed to do a mammogram   Her flu vaccine was updated today          The following portions of the patient's history were reviewed and updated as appropriate:   She   Patient Active Problem List    Diagnosis Date Noted    Screening for breast cancer 01/08/2019    Medicare annual wellness visit, subsequent 01/07/2019    Hypothyroidism 01/27/2014    Obesity 11/04/2013    Benign essential HTN 06/18/2012    Hyperlipidemia 06/18/2012    Vitamin D deficiency 06/18/2012     Current Outpatient Medications   Medication Sig Dispense Refill    Cholecalciferol (VITAMIN D3) 1000 units CAPS Take by mouth      simvastatin (ZOCOR) 10 mg tablet Take 1 tablet (10 mg total) by mouth daily 90 tablet 1    verapamil (CALAN-SR) 180 mg CR tablet Take 1 tablet (180 mg total) by mouth daily 90 tablet 1     No current facility-administered medications for this visit  Current Outpatient Medications on File Prior to Visit   Medication Sig    Cholecalciferol (VITAMIN D3) 1000 units CAPS Take by mouth    [DISCONTINUED] simvastatin (ZOCOR) 10 mg tablet Take 1 tablet (10 mg total) by mouth daily    [DISCONTINUED] verapamil (CALAN-SR) 180 mg CR tablet Take 1 tablet (180 mg total) by mouth daily    [DISCONTINUED] simvastatin (ZOCOR) 10 mg tablet TAKE 1 TABLET BY MOUTH EVERY DAY     No current facility-administered medications on file prior to visit  She is allergic to epinephrine; fluconazole; naproxen; shellfish-derived products; and sulfa antibiotics       Review of Systems   Constitutional: Negative for activity change and unexpected weight change  HENT: Negative for ear pain and sore throat  Eyes: Negative for visual disturbance  Respiratory: Negative for cough, shortness of breath and wheezing  Cardiovascular: Negative for chest pain and leg swelling  Gastrointestinal: Negative for abdominal pain, blood in stool, constipation, diarrhea, nausea and vomiting  Genitourinary: Negative for difficulty urinating  Musculoskeletal: Negative for arthralgias and myalgias  Skin: Negative for rash  Neurological: Negative for dizziness, syncope, light-headedness and headaches  Psychiatric/Behavioral: Negative for self-injury, sleep disturbance and suicidal ideas  The patient is not nervous/anxious  Objective:        Physical Exam   Constitutional: She is oriented to person, place, and time  She appears well-developed and well-nourished  No distress  HENT:   Head: Normocephalic and atraumatic  Right Ear: Tympanic membrane, external ear and ear canal normal    Left Ear: Tympanic membrane, external ear and ear canal normal    Mouth/Throat: Oropharynx is clear and moist  No oropharyngeal exudate or posterior oropharyngeal erythema  Eyes: Pupils are equal, round, and reactive to light  Conjunctivae are normal    Neck: Neck supple  Carotid bruit is not present  No thyromegaly present  Cardiovascular: Normal rate, regular rhythm and normal heart sounds  Exam reveals no gallop and no friction rub  No murmur heard  Pulmonary/Chest: Effort normal and breath sounds normal  No respiratory distress  She has no wheezes  Abdominal: Soft  Bowel sounds are normal  She exhibits no distension and no mass  There is no tenderness  Musculoskeletal: She exhibits no edema  Lymphadenopathy:     She has no cervical adenopathy  Neurological: She is alert and oriented to person, place, and time  Skin: Skin is warm and dry  No rash noted  She is not diaphoretic  No erythema  Psychiatric: She has a normal mood and affect  Her behavior is normal  Judgment and thought content normal    Vitals reviewed

## 2020-01-09 NOTE — PROGRESS NOTES
Assessment and Plan:     Problem List Items Addressed This Visit        Endocrine    Hypothyroidism       Cardiovascular and Mediastinum    Benign essential HTN       Other    Hyperlipidemia    Medicare annual wellness visit, subsequent - Primary      Other Visit Diagnoses     Encounter for immunization        Relevant Orders    influenza vaccine, [id number], high-dose, PF 0 5 mL (FLUZONE HIGH-DOSE)        BMI Counseling: Body mass index is 31 kg/m²  The BMI is above normal  Nutrition recommendations include decreasing portion sizes and moderation in carbohydrate intake  Exercise recommendations include exercising 3-5 times per week  Pt  Going  To  [place] and  [facility]  For  exercise        Preventive health issues were discussed with patient, and age appropriate screening tests were ordered as noted in patient's After Visit Summary  Personalized health advice and appropriate referrals for health education or preventive services given if needed, as noted in patient's After Visit Summary  History of Present Illness:     Patient presents for Medicare Annual Wellness visit    Patient Care Team:  Syeda Lagunas PA-C as PCP - General (Family Medicine)  MD Syeda Priest PA-C Greig Russian, MD     Problem List:     Patient Active Problem List   Diagnosis    Benign essential HTN    Hyperlipidemia    Hypothyroidism    Obesity    Vitamin D deficiency    Medicare annual wellness visit, subsequent    Screening for breast cancer      Past Medical and Surgical History:     Past Medical History:   Diagnosis Date    Hypertension      Past Surgical History:   Procedure Laterality Date    TOTAL HIP ARTHROPLASTY Bilateral     TOTAL KNEE ARTHROPLASTY Right       Family History:     No family history on file     Social History:     Social History     Socioeconomic History    Marital status: /Civil Union     Spouse name: None    Number of children: None    Years of education: None    Highest education level: None   Occupational History    None   Social Needs    Financial resource strain: None    Food insecurity:     Worry: None     Inability: None    Transportation needs:     Medical: None     Non-medical: None   Tobacco Use    Smoking status: Never Smoker    Smokeless tobacco: Never Used   Substance and Sexual Activity    Alcohol use: No    Drug use: No    Sexual activity: None   Lifestyle    Physical activity:     Days per week: None     Minutes per session: None    Stress: None   Relationships    Social connections:     Talks on phone: None     Gets together: None     Attends [employer] service: None     Active member of club or organization: None     Attends meetings of clubs or organizations: None     Relationship status: None    Intimate partner violence:     Fear of current or ex partner: None     Emotionally abused: None     Physically abused: None     Forced sexual activity: None   Other Topics Concern    None   Social History Narrative    None       Medications and Allergies:     Current Outpatient Medications   Medication Sig Dispense Refill    Cholecalciferol (VITAMIN D3) 1000 units CAPS Take by mouth      simvastatin (ZOCOR) 10 mg tablet Take 1 tablet (10 mg total) by mouth daily 90 tablet 1    verapamil (CALAN-SR) 180 mg CR tablet Take 1 tablet (180 mg total) by mouth daily 90 tablet 1     No current facility-administered medications for this visit        Allergies   Allergen Reactions    Epinephrine     Fluconazole     Naproxen     Shellfish-Derived Products     Sulfa Antibiotics       Immunizations:     Immunization History   Administered Date(s) Administered    INFLUENZA 11/10/2014, 02/02/2016    Influenza Split High Dose Preservative Free IM 11/04/2013    Influenza TIV (IM) 11/10/2014, 02/02/2016    Pneumococcal Conjugate 13-Valent 01/22/2018    Pneumococcal Polysaccharide PPV23 11/10/2014      Health Maintenance:         Topic Date Due    DXA SCAN  1945    CRC Screening: Colonoscopy  09/09/2020         Topic Date Due    DTaP,Tdap,and Td Vaccines (1 - Tdap) 06/29/1956    Influenza Vaccine  07/01/2019      Medicare Health Risk Assessment:     /78 (BP Location: Right arm, Patient Position: Sitting, Cuff Size: Large)   Pulse 69   Temp 97 5 °F (36 4 °C)   Resp 16   Ht 5' 3" (1 6 m)   Wt 79 4 kg (175 lb)   SpO2 97%   BMI 31 00 kg/m²      Marissa Joel is here for her Subsequent Wellness visit  Last Medicare Wellness visit information reviewed, patient interviewed and updates made to the record today  Health Risk Assessment:   Patient rates overall health as good  Patient feels that their physical health rating is same  Eyesight was rated as same  Hearing was rated as same  Patient feels that their emotional and mental health rating is same  Pain experienced in the last 7 days has been none  Patient states that she has experienced no weight loss or gain in last 6 months  Depression Screening:   PHQ-2 Score: 0      Fall Risk Screening: In the past year, patient has experienced: no history of falling in past year      Urinary Incontinence Screening:   Patient has not leaked urine accidently in the last six months  Home Safety:  Patient does not have trouble with stairs inside or outside of their home  Patient has working smoke alarms and has working carbon monoxide detector  Home safety hazards include: none  Nutrition:   Current diet is Regular  Medications:   Patient is currently taking over-the-counter supplements  OTC medications include: see medication list  Patient is able to manage medications  Activities of Daily Living (ADLs)/Instrumental Activities of Daily Living (IADLs):   Walk and transfer into and out of bed and chair?: Yes  Dress and groom yourself?: Yes    Bathe or shower yourself?: Yes    Feed yourself?  Yes  Do your laundry/housekeeping?: Yes  Manage your money, pay your bills and track your expenses?: Yes  Make your own meals?: Yes    Do your own shopping?: Yes    Previous Hospitalizations:   Any hospitalizations or ED visits within the last 12 months?: No      Advance Care Planning:   Living will: Yes    Durable POA for healthcare: No    Advanced directive: Yes      Cognitive Screening:   Provider or family/friend/caregiver concerned regarding cognition?: Yes    PREVENTIVE SCREENINGS      Cardiovascular Screening:    General: History Lipid Disorder    Due for: Lipid Panel      Diabetes Screening:       Due for: Blood Glucose      Colorectal Cancer Screening:     General: Screening Current      Breast Cancer Screening:       Due for: Mammogram        Cervical Cancer Screening:    General: Screening Not Indicated      Osteoporosis Screening:    General: Patient Declines      Abdominal Aortic Aneurysm (AAA) Screening:        General: Screening Not Indicated      Lung Cancer Screening:     General: Screening Not Indicated      Tosha Hernandez PA-C

## 2020-01-10 ENCOUNTER — TELEPHONE (OUTPATIENT)
Dept: FAMILY MEDICINE CLINIC | Facility: CLINIC | Age: 75
End: 2020-01-10

## 2020-01-10 NOTE — TELEPHONE ENCOUNTER
Spoke with patient's   He was concerned about her possible memory loss  Patient passed her mm PI with flying colors    We will continue to observe

## 2020-01-10 NOTE — TELEPHONE ENCOUNTER
Call from patients   He would like to discuss his wife's most recent visit  He claims she will not relay to him what was discussed  There is a medical communication consent on file  He also requested you call [phone] between 10:45am-11:30am if possible

## 2020-01-14 ENCOUNTER — APPOINTMENT (OUTPATIENT)
Dept: LAB | Facility: MEDICAL CENTER | Age: 75
End: 2020-01-14
Payer: MEDICARE

## 2020-01-14 ENCOUNTER — HOSPITAL ENCOUNTER (OUTPATIENT)
Dept: RADIOLOGY | Facility: MEDICAL CENTER | Age: 75
Discharge: HOME | End: 2020-01-14
Payer: MEDICARE

## 2020-01-14 VITALS — BODY MASS INDEX: 31.01 KG/M2 | HEIGHT: 63 IN | WEIGHT: 175 LBS

## 2020-01-14 DIAGNOSIS — Z12.39 SCREENING FOR BREAST CANCER: ICD-10-CM

## 2020-01-14 DIAGNOSIS — Z12.31 VISIT FOR SCREENING MAMMOGRAM: ICD-10-CM

## 2020-01-14 LAB
ALBUMIN SERPL BCP-MCNC: 4.1 G/DL (ref 3.5–5)
ALP SERPL-CCNC: 88 U/L (ref 46–116)
ALT SERPL W P-5'-P-CCNC: 18 U/L (ref 12–78)
ANION GAP SERPL CALCULATED.3IONS-SCNC: 8 MMOL/L (ref 4–13)
AST SERPL W P-5'-P-CCNC: 15 U/L (ref 5–45)
BILIRUB SERPL-MCNC: 0.5 MG/DL (ref 0.2–1)
BUN SERPL-MCNC: 16 MG/DL (ref 5–25)
CALCIUM SERPL-MCNC: 9.4 MG/DL (ref 8.3–10.1)
CHLORIDE SERPL-SCNC: 108 MMOL/L (ref 100–108)
CHOLEST SERPL-MCNC: 223 MG/DL (ref 50–200)
CO2 SERPL-SCNC: 26 MMOL/L (ref 21–32)
CREAT SERPL-MCNC: 0.99 MG/DL (ref 0.6–1.3)
GFR SERPL CREATININE-BSD FRML MDRD: 56 ML/MIN/1.73SQ M
GLUCOSE P FAST SERPL-MCNC: 80 MG/DL (ref 65–99)
HDLC SERPL-MCNC: 99 MG/DL
LDLC SERPL CALC-MCNC: 115 MG/DL (ref 0–100)
NONHDLC SERPL-MCNC: 124 MG/DL
POTASSIUM SERPL-SCNC: 4.2 MMOL/L (ref 3.5–5.3)
PROT SERPL-MCNC: 8 G/DL (ref 6.4–8.2)
SODIUM SERPL-SCNC: 142 MMOL/L (ref 136–145)
T3 SERPL-MCNC: 1 NG/ML (ref 0.6–1.8)
T4 SERPL-MCNC: 11 UG/DL (ref 4.7–13.3)
TRIGL SERPL-MCNC: 46 MG/DL
TSH SERPL DL<=0.05 MIU/L-ACNC: 4.91 UIU/ML (ref 0.36–3.74)

## 2020-01-14 PROCEDURE — 80061 LIPID PANEL: CPT | Performed by: PHYSICIAN ASSISTANT

## 2020-01-14 PROCEDURE — 80053 COMPREHEN METABOLIC PANEL: CPT | Performed by: PHYSICIAN ASSISTANT

## 2020-01-14 PROCEDURE — 36415 COLL VENOUS BLD VENIPUNCTURE: CPT | Performed by: PHYSICIAN ASSISTANT

## 2020-01-14 PROCEDURE — 77067 SCR MAMMO BI INCL CAD: CPT

## 2020-01-14 PROCEDURE — 84443 ASSAY THYROID STIM HORMONE: CPT | Performed by: PHYSICIAN ASSISTANT

## 2020-01-14 PROCEDURE — 84436 ASSAY OF TOTAL THYROXINE: CPT | Performed by: PHYSICIAN ASSISTANT

## 2020-01-14 PROCEDURE — 84480 ASSAY TRIIODOTHYRONINE (T3): CPT | Performed by: PHYSICIAN ASSISTANT

## 2020-01-17 ENCOUNTER — TELEPHONE (OUTPATIENT)
Dept: FAMILY MEDICINE CLINIC | Facility: CLINIC | Age: 75
End: 2020-01-17

## 2020-01-17 NOTE — TELEPHONE ENCOUNTER
Spoke with patient  Her labs were reviewed CMP and lipids acceptable  TSH slightly elevated which has been the same for the last 3 or 4 years  Patient is not on thyroid replacement    Her T4 is normal   We will continue to observe no thyroid replacement meds ordered

## 2020-03-18 DIAGNOSIS — E78.5 HYPERLIPIDEMIA, UNSPECIFIED HYPERLIPIDEMIA TYPE: ICD-10-CM

## 2020-03-18 RX ORDER — SIMVASTATIN 10 MG
TABLET ORAL
Qty: 90 TABLET | Refills: 1 | Status: SHIPPED | OUTPATIENT
Start: 2020-03-18 | End: 2020-09-14

## 2020-05-11 DIAGNOSIS — I10 BENIGN ESSENTIAL HTN: ICD-10-CM

## 2020-06-22 ENCOUNTER — OFFICE VISIT (OUTPATIENT)
Dept: FAMILY MEDICINE CLINIC | Facility: CLINIC | Age: 75
End: 2020-06-22
Payer: MEDICARE

## 2020-06-22 VITALS
BODY MASS INDEX: 31.36 KG/M2 | RESPIRATION RATE: 16 BRPM | TEMPERATURE: 97.2 F | WEIGHT: 177 LBS | DIASTOLIC BLOOD PRESSURE: 90 MMHG | OXYGEN SATURATION: 96 % | HEIGHT: 63 IN | HEART RATE: 108 BPM | SYSTOLIC BLOOD PRESSURE: 150 MMHG
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
## 2020-06-22 DIAGNOSIS — Z23 ENCOUNTER FOR IMMUNIZATION: ICD-10-CM

## 2020-06-22 DIAGNOSIS — S51.831A PUNCTURE WOUND OF RIGHT FOREARM, INITIAL ENCOUNTER: ICD-10-CM

## 2020-06-22 PROCEDURE — 99214 OFFICE O/P EST MOD 30 MIN: CPT | Performed by: PHYSICIAN ASSISTANT

## 2020-06-22 PROCEDURE — 4040F PNEUMOC VAC/ADMIN/RCVD: CPT | Performed by: PHYSICIAN ASSISTANT

## 2020-06-22 PROCEDURE — 90715 TDAP VACCINE 7 YRS/> IM: CPT

## 2020-06-22 PROCEDURE — 90471 IMMUNIZATION ADMIN: CPT

## 2020-06-22 PROCEDURE — 3077F SYST BP >= 140 MM HG: CPT | Performed by: PHYSICIAN ASSISTANT

## 2020-06-22 PROCEDURE — 1160F RVW MEDS BY RX/DR IN RCRD: CPT | Performed by: PHYSICIAN ASSISTANT

## 2020-06-22 PROCEDURE — 3080F DIAST BP >= 90 MM HG: CPT | Performed by: PHYSICIAN ASSISTANT

## 2020-06-22 PROCEDURE — 1036F TOBACCO NON-USER: CPT | Performed by: PHYSICIAN ASSISTANT

## 2020-06-22 RX ORDER — AMOXICILLIN AND CLAVULANATE POTASSIUM 875; 125 MG/1; MG/1
1 TABLET, FILM COATED ORAL EVERY 12 HOURS SCHEDULED
Qty: 14 TABLET | Refills: 0 | Status: SHIPPED | OUTPATIENT
Start: 2020-06-22 | End: 2020-06-29

## 2020-09-14 DIAGNOSIS — E78.5 HYPERLIPIDEMIA, UNSPECIFIED HYPERLIPIDEMIA TYPE: ICD-10-CM

## 2020-09-14 RX ORDER — SIMVASTATIN 10 MG
TABLET ORAL
Qty: 90 TABLET | Refills: 1 | Status: SHIPPED | OUTPATIENT
Start: 2020-09-14 | End: 2021-12-23 | Stop reason: SDUPTHER

## 2021-01-22 DIAGNOSIS — I10 BENIGN ESSENTIAL HTN: ICD-10-CM

## 2021-02-09 DIAGNOSIS — Z23 ENCOUNTER FOR IMMUNIZATION: ICD-10-CM

## 2021-02-12 ENCOUNTER — IMMUNIZATIONS (OUTPATIENT)
Dept: FAMILY MEDICINE CLINIC | Facility: HOSPITAL | Age: 76
End: 2021-02-12

## 2021-02-12 DIAGNOSIS — Z23 ENCOUNTER FOR IMMUNIZATION: Primary | ICD-10-CM

## 2021-02-12 PROCEDURE — 0001A SARS-COV-2 / COVID-19 MRNA VACCINE (PFIZER-BIONTECH) 30 MCG: CPT

## 2021-02-12 PROCEDURE — 91300 SARS-COV-2 / COVID-19 MRNA VACCINE (PFIZER-BIONTECH) 30 MCG: CPT

## 2021-03-04 ENCOUNTER — IMMUNIZATIONS (OUTPATIENT)
Dept: FAMILY MEDICINE CLINIC | Facility: HOSPITAL | Age: 76
End: 2021-03-04

## 2021-03-04 DIAGNOSIS — Z23 ENCOUNTER FOR IMMUNIZATION: Primary | ICD-10-CM

## 2021-03-04 PROCEDURE — 91300 SARS-COV-2 / COVID-19 MRNA VACCINE (PFIZER-BIONTECH) 30 MCG: CPT

## 2021-03-04 PROCEDURE — 0002A SARS-COV-2 / COVID-19 MRNA VACCINE (PFIZER-BIONTECH) 30 MCG: CPT

## 2021-10-22 PROCEDURE — U0005 INFEC AGEN DETEC AMPLI PROBE: HCPCS | Performed by: PHYSICIAN ASSISTANT

## 2021-10-22 PROCEDURE — U0003 INFECTIOUS AGENT DETECTION BY NUCLEIC ACID (DNA OR RNA); SEVERE ACUTE RESPIRATORY SYNDROME CORONAVIRUS 2 (SARS-COV-2) (CORONAVIRUS DISEASE [COVID-19]), AMPLIFIED PROBE TECHNIQUE, MAKING USE OF HIGH THROUGHPUT TECHNOLOGIES AS DESCRIBED BY CMS-2020-01-R: HCPCS | Performed by: PHYSICIAN ASSISTANT

## 2021-10-26 ENCOUNTER — TELEMEDICINE (OUTPATIENT)
Dept: FAMILY MEDICINE CLINIC | Facility: CLINIC | Age: 76
End: 2021-10-26
Payer: MEDICARE

## 2021-10-26 DIAGNOSIS — U07.1 COVID-19: Primary | ICD-10-CM

## 2021-10-26 PROCEDURE — G2012 BRIEF CHECK IN BY MD/QHP: HCPCS | Performed by: PHYSICIAN ASSISTANT

## 2021-11-03 DIAGNOSIS — I10 BENIGN ESSENTIAL HTN: ICD-10-CM

## 2021-12-23 DIAGNOSIS — E78.5 HYPERLIPIDEMIA, UNSPECIFIED HYPERLIPIDEMIA TYPE: ICD-10-CM

## 2021-12-23 RX ORDER — SIMVASTATIN 10 MG
10 TABLET ORAL DAILY
Qty: 90 TABLET | Refills: 0 | Status: SHIPPED | OUTPATIENT
Start: 2021-12-23 | End: 2022-04-06 | Stop reason: SDUPTHER

## 2021-12-28 DIAGNOSIS — I10 BENIGN ESSENTIAL HTN: ICD-10-CM

## 2022-02-08 ENCOUNTER — TELEPHONE (OUTPATIENT)
Dept: FAMILY MEDICINE CLINIC | Facility: CLINIC | Age: 77
End: 2022-02-08

## 2022-04-05 ENCOUNTER — TELEPHONE (OUTPATIENT)
Dept: FAMILY MEDICINE CLINIC | Facility: CLINIC | Age: 77
End: 2022-04-05

## 2022-04-05 DIAGNOSIS — E55.9 VITAMIN D DEFICIENCY: ICD-10-CM

## 2022-04-05 DIAGNOSIS — I10 BENIGN ESSENTIAL HTN: Primary | ICD-10-CM

## 2022-04-05 DIAGNOSIS — F03.90 DEMENTIA WITHOUT BEHAVIORAL DISTURBANCE, UNSPECIFIED DEMENTIA TYPE (HCC): ICD-10-CM

## 2022-04-05 DIAGNOSIS — E03.9 ACQUIRED HYPOTHYROIDISM: ICD-10-CM

## 2022-04-05 DIAGNOSIS — E78.2 MIXED HYPERLIPIDEMIA: ICD-10-CM

## 2022-04-05 DIAGNOSIS — E78.5 HYPERLIPIDEMIA, UNSPECIFIED HYPERLIPIDEMIA TYPE: ICD-10-CM

## 2022-04-05 RX ORDER — SIMVASTATIN 10 MG
10 TABLET ORAL DAILY
Qty: 90 TABLET | Refills: 0 | Status: CANCELLED | OUTPATIENT
Start: 2022-04-05

## 2022-04-05 NOTE — TELEPHONE ENCOUNTER
Patient's spouse came in asking for labs to be ordered before coming in for an appt  Pt has dementia  Requested to see Dr MILLER  Please advise  Also asked for 30 day supply of Simvastatin to [facility] in [place]

## 2022-04-06 DIAGNOSIS — E78.5 HYPERLIPIDEMIA, UNSPECIFIED HYPERLIPIDEMIA TYPE: ICD-10-CM

## 2022-04-06 RX ORDER — SIMVASTATIN 10 MG
10 TABLET ORAL DAILY
Qty: 90 TABLET | Refills: 0 | Status: SHIPPED | OUTPATIENT
Start: 2022-04-06 | End: 2022-04-08 | Stop reason: SDUPTHER

## 2022-04-08 DIAGNOSIS — E78.5 HYPERLIPIDEMIA, UNSPECIFIED HYPERLIPIDEMIA TYPE: ICD-10-CM

## 2022-04-08 RX ORDER — SIMVASTATIN 10 MG
10 TABLET ORAL DAILY
Qty: 30 TABLET | Refills: 0 | Status: SHIPPED | OUTPATIENT
Start: 2022-04-08 | End: 2022-04-28 | Stop reason: SDUPTHER

## 2022-04-11 ENCOUNTER — APPOINTMENT (OUTPATIENT)
Dept: LAB | Facility: MEDICAL CENTER | Age: 77
End: 2022-04-11
Payer: COMMERCIAL

## 2022-04-11 DIAGNOSIS — F03.90 DEMENTIA WITHOUT BEHAVIORAL DISTURBANCE, UNSPECIFIED DEMENTIA TYPE (HCC): ICD-10-CM

## 2022-04-11 DIAGNOSIS — I10 BENIGN ESSENTIAL HTN: ICD-10-CM

## 2022-04-11 DIAGNOSIS — E78.2 MIXED HYPERLIPIDEMIA: ICD-10-CM

## 2022-04-11 DIAGNOSIS — E03.9 ACQUIRED HYPOTHYROIDISM: ICD-10-CM

## 2022-04-11 DIAGNOSIS — E55.9 VITAMIN D DEFICIENCY: ICD-10-CM

## 2022-04-11 LAB
25(OH)D3 SERPL-MCNC: 43.1 NG/ML (ref 30–100)
ALBUMIN SERPL BCP-MCNC: 3.9 G/DL (ref 3.5–5)
ALP SERPL-CCNC: 82 U/L (ref 46–116)
ALT SERPL W P-5'-P-CCNC: 17 U/L (ref 12–78)
ANION GAP SERPL CALCULATED.3IONS-SCNC: 8 MMOL/L (ref 4–13)
AST SERPL W P-5'-P-CCNC: 16 U/L (ref 5–45)
BASOPHILS # BLD AUTO: 0.04 THOUSANDS/ΜL (ref 0–0.1)
BASOPHILS NFR BLD AUTO: 1 % (ref 0–1)
BILIRUB SERPL-MCNC: 0.45 MG/DL (ref 0.2–1)
BUN SERPL-MCNC: 22 MG/DL (ref 5–25)
CALCIUM SERPL-MCNC: 9.6 MG/DL (ref 8.3–10.1)
CHLORIDE SERPL-SCNC: 107 MMOL/L (ref 100–108)
CHOLEST SERPL-MCNC: 196 MG/DL
CO2 SERPL-SCNC: 26 MMOL/L (ref 21–32)
CREAT SERPL-MCNC: 1.16 MG/DL (ref 0.6–1.3)
EOSINOPHIL # BLD AUTO: 0.18 THOUSAND/ΜL (ref 0–0.61)
EOSINOPHIL NFR BLD AUTO: 3 % (ref 0–6)
ERYTHROCYTE [DISTWIDTH] IN BLOOD BY AUTOMATED COUNT: 14.1 % (ref 11.6–15.1)
GFR SERPL CREATININE-BSD FRML MDRD: 45 ML/MIN/1.73SQ M
GLUCOSE P FAST SERPL-MCNC: 88 MG/DL (ref 65–99)
HCT VFR BLD AUTO: 40.7 % (ref 34.8–46.1)
HDLC SERPL-MCNC: 79 MG/DL
HGB BLD-MCNC: 13.6 G/DL (ref 11.5–15.4)
IMM GRANULOCYTES # BLD AUTO: 0.02 THOUSAND/UL (ref 0–0.2)
IMM GRANULOCYTES NFR BLD AUTO: 0 % (ref 0–2)
LDLC SERPL CALC-MCNC: 105 MG/DL (ref 0–100)
LYMPHOCYTES # BLD AUTO: 1.74 THOUSANDS/ΜL (ref 0.6–4.47)
LYMPHOCYTES NFR BLD AUTO: 31 % (ref 14–44)
MCH RBC QN AUTO: 29.4 PG (ref 26.8–34.3)
MCHC RBC AUTO-ENTMCNC: 33.4 G/DL (ref 31.4–37.4)
MCV RBC AUTO: 88 FL (ref 82–98)
MONOCYTES # BLD AUTO: 0.52 THOUSAND/ΜL (ref 0.17–1.22)
MONOCYTES NFR BLD AUTO: 9 % (ref 4–12)
NEUTROPHILS # BLD AUTO: 3.04 THOUSANDS/ΜL (ref 1.85–7.62)
NEUTS SEG NFR BLD AUTO: 56 % (ref 43–75)
NONHDLC SERPL-MCNC: 117 MG/DL
NRBC BLD AUTO-RTO: 0 /100 WBCS
PLATELET # BLD AUTO: 254 THOUSANDS/UL (ref 149–390)
PMV BLD AUTO: 9.9 FL (ref 8.9–12.7)
POTASSIUM SERPL-SCNC: 4.4 MMOL/L (ref 3.5–5.3)
PROT SERPL-MCNC: 8.3 G/DL (ref 6.4–8.2)
RBC # BLD AUTO: 4.62 MILLION/UL (ref 3.81–5.12)
SODIUM SERPL-SCNC: 141 MMOL/L (ref 136–145)
TRIGL SERPL-MCNC: 61 MG/DL
TSH SERPL DL<=0.05 MIU/L-ACNC: 7.05 UIU/ML (ref 0.45–4.5)
WBC # BLD AUTO: 5.54 THOUSAND/UL (ref 4.31–10.16)

## 2022-04-11 PROCEDURE — 36415 COLL VENOUS BLD VENIPUNCTURE: CPT

## 2022-04-11 PROCEDURE — 82306 VITAMIN D 25 HYDROXY: CPT

## 2022-04-11 PROCEDURE — 80053 COMPREHEN METABOLIC PANEL: CPT

## 2022-04-11 PROCEDURE — 80061 LIPID PANEL: CPT

## 2022-04-11 PROCEDURE — 85025 COMPLETE CBC W/AUTO DIFF WBC: CPT

## 2022-04-11 PROCEDURE — 84443 ASSAY THYROID STIM HORMONE: CPT

## 2022-04-28 ENCOUNTER — OFFICE VISIT (OUTPATIENT)
Dept: FAMILY MEDICINE CLINIC | Facility: CLINIC | Age: 77
End: 2022-04-28
Payer: COMMERCIAL

## 2022-04-28 VITALS
SYSTOLIC BLOOD PRESSURE: 142 MMHG | BODY MASS INDEX: 35.22 KG/M2 | WEIGHT: 198.8 LBS | HEART RATE: 68 BPM | OXYGEN SATURATION: 98 % | TEMPERATURE: 98.6 F | DIASTOLIC BLOOD PRESSURE: 76 MMHG | HEIGHT: 63 IN

## 2022-04-28 DIAGNOSIS — Z00.00 MEDICARE ANNUAL WELLNESS VISIT, SUBSEQUENT: Primary | ICD-10-CM

## 2022-04-28 DIAGNOSIS — E03.9 ACQUIRED HYPOTHYROIDISM: ICD-10-CM

## 2022-04-28 DIAGNOSIS — F03.90 DEMENTIA WITHOUT BEHAVIORAL DISTURBANCE, UNSPECIFIED DEMENTIA TYPE (HCC): ICD-10-CM

## 2022-04-28 DIAGNOSIS — E78.2 MIXED HYPERLIPIDEMIA: ICD-10-CM

## 2022-04-28 DIAGNOSIS — I10 BENIGN ESSENTIAL HTN: ICD-10-CM

## 2022-04-28 PROCEDURE — 1125F AMNT PAIN NOTED PAIN PRSNT: CPT | Performed by: FAMILY MEDICINE

## 2022-04-28 PROCEDURE — 1160F RVW MEDS BY RX/DR IN RCRD: CPT | Performed by: FAMILY MEDICINE

## 2022-04-28 PROCEDURE — 99214 OFFICE O/P EST MOD 30 MIN: CPT | Performed by: FAMILY MEDICINE

## 2022-04-28 PROCEDURE — 1003F LEVEL OF ACTIVITY ASSESS: CPT | Performed by: FAMILY MEDICINE

## 2022-04-28 PROCEDURE — 1170F FXNL STATUS ASSESSED: CPT | Performed by: FAMILY MEDICINE

## 2022-04-28 PROCEDURE — G0439 PPPS, SUBSEQ VISIT: HCPCS | Performed by: FAMILY MEDICINE

## 2022-04-28 PROCEDURE — 3288F FALL RISK ASSESSMENT DOCD: CPT | Performed by: FAMILY MEDICINE

## 2022-04-28 PROCEDURE — 3725F SCREEN DEPRESSION PERFORMED: CPT | Performed by: FAMILY MEDICINE

## 2022-04-28 PROCEDURE — 1090F PRES/ABSN URINE INCON ASSESS: CPT | Performed by: FAMILY MEDICINE

## 2022-04-28 PROCEDURE — 1036F TOBACCO NON-USER: CPT | Performed by: FAMILY MEDICINE

## 2022-04-28 RX ORDER — LEVOTHYROXINE SODIUM 0.03 MG/1
25 TABLET ORAL
Qty: 30 TABLET | Refills: 1 | Status: SHIPPED | OUTPATIENT
Start: 2022-04-28 | End: 2022-06-24 | Stop reason: SDUPTHER

## 2022-04-28 RX ORDER — SIMVASTATIN 10 MG
10 TABLET ORAL DAILY
Qty: 90 TABLET | Refills: 1 | Status: SHIPPED | OUTPATIENT
Start: 2022-04-28

## 2022-04-28 NOTE — PROGRESS NOTES
Assessment and Plan:     Problem List Items Addressed This Visit        Cardiovascular and Mediastinum    Benign essential HTN       Other    Hyperlipidemia    Relevant Medications    simvastatin (ZOCOR) 10 mg tablet    Medicare annual wellness visit, subsequent - Primary        BMI Counseling: Body mass index is 35 22 kg/m²  The BMI is above normal  Nutrition recommendations include decreasing portion sizes, encouraging healthy choices of fruits and vegetables, decreasing fast food intake, consuming healthier snacks, limiting drinks that contain sugar, moderation in carbohydrate intake and increasing intake of lean protein  Exercise recommendations include exercising 3-5 times per week and strength training exercises  No pharmacotherapy was ordered  Patient referred to PCP  Rationale for BMI follow-up plan is due to patient being overweight or obese  Depression Screening and Follow-up Plan: Patient was screened for depression during today's encounter  They screened negative with a PHQ-2 score of 0  Preventive health issues were discussed with patient, and age appropriate screening tests were ordered as noted in patient's After Visit Summary  Personalized health advice and appropriate referrals for health education or preventive services given if needed, as noted in patient's After Visit Summary       History of Present Illness:     Patient presents for Medicare Annual Wellness visit    Patient Care Team:  Edgard Yanez PA-C as PCP - General (Family Medicine)  MD Edgard Benito PA-C Karle Gerold, MD     Problem List:     Patient Active Problem List   Diagnosis    Benign essential HTN    Hyperlipidemia    Hypothyroidism    Obesity    Vitamin D deficiency    Medicare annual wellness visit, subsequent    Screening for breast cancer      Past Medical and Surgical History:     Past Medical History:   Diagnosis Date    Hypertension      Past Surgical History:   Procedure Laterality Date    BREAST BIOPSY Left     BREAST CYST EXCISION Right 1983    TOTAL HIP ARTHROPLASTY Bilateral     TOTAL KNEE ARTHROPLASTY Right       Family History:     Family History   Problem Relation Age of Onset    No Known Problems Mother     No Known Problems Father     No Known Problems Maternal Grandmother     No Known Problems Maternal Grandfather     No Known Problems Paternal Grandmother     No Known Problems Paternal Grandfather     No Known Problems Maternal Aunt     No Known Problems Son       Social History:     Social History     Socioeconomic History    Marital status: /Civil Union     Spouse name: None    Number of children: None    Years of education: None    Highest education level: None   Occupational History    None   Tobacco Use    Smoking status: Never Smoker    Smokeless tobacco: Never Used   Substance and Sexual Activity    Alcohol use: No    Drug use: No    Sexual activity: None   Other Topics Concern    None   Social History Narrative    None     Social Determinants of Health     Financial Resource Strain: Not on file   Food Insecurity: Not on file   Transportation Needs: Not on file   Physical Activity: Not on file   Stress: Not on file   Social Connections: Not on file   Intimate Partner Violence: Not on file   Housing Stability: Not on file      Medications and Allergies:     Current Outpatient Medications   Medication Sig Dispense Refill    Apoaequorin (Prevagen) 10 MG CAPS Take by mouth      Cholecalciferol (VITAMIN D3) 1000 units CAPS Take 2,000 Units by mouth       simvastatin (ZOCOR) 10 mg tablet Take 1 tablet (10 mg total) by mouth daily 90 tablet 1    verapamil (CALAN-SR) 180 mg CR tablet TAKE 1 TABLET DAILY 90 tablet 3     No current facility-administered medications for this visit       Allergies   Allergen Reactions    Epinephrine     Fluconazole     Naproxen     Shellfish-Derived Products - Food Allergy     Sulfa Antibiotics Immunizations:     Immunization History   Administered Date(s) Administered    COVID-19 PFIZER VACCINE 0 3 ML IM 02/12/2021, 03/04/2021    INFLUENZA 11/10/2014, 02/02/2016    Influenza Split High Dose Preservative Free IM 11/04/2013    Influenza, high dose seasonal 0 7 mL 01/09/2020    Influenza, seasonal, injectable 11/10/2014, 02/02/2016    Pneumococcal Conjugate 13-Valent 01/22/2018    Pneumococcal Polysaccharide PPV23 11/10/2014    Tdap 06/22/2020      Health Maintenance:         Topic Date Due    Hepatitis C Screening  Never done    DXA SCAN  Never done         Topic Date Due    COVID-19 Vaccine (3 - Booster for Pfizer series) 08/04/2021    Influenza Vaccine (1) 09/01/2021      Medicare Health Risk Assessment:     /76 (BP Location: Right arm, Patient Position: Sitting, Cuff Size: Standard)   Pulse 68   Temp 98 6 °F (37 °C)   Ht 5' 3" (1 6 m)   Wt 90 2 kg (198 lb 12 8 oz)   SpO2 98%   BMI 35 22 kg/m²      [place] is here for her Subsequent Wellness visit  Last Medicare Wellness visit information reviewed, patient interviewed and updates made to the record today  Health Risk Assessment:   Patient rates overall health as good  Patient feels that their physical health rating is same  Patient is very satisfied with their life  Eyesight was rated as same  Hearing was rated as same  Patient feels that their emotional and mental health rating is same  Patients states they are never, rarely angry  Patient states they are sometimes unusually tired/fatigued  Pain experienced in the last 7 days has been none  Patient states that she has experienced no weight loss or gain in last 6 months  Depression Screening:   PHQ-2 Score: 0      Fall Risk Screening: In the past year, patient has experienced: no history of falling in past year      Urinary Incontinence Screening:   Patient has leaked urine accidently in the last six months       Home Safety:  Patient has trouble with stairs inside or outside of their home  Patient has working smoke alarms and has no working carbon monoxide detector  Home safety hazards include: none  Nutrition:   Current diet is Regular  Medications:   Patient is not currently taking any over-the-counter supplements  Patient is not able to manage medications  Activities of Daily Living (ADLs)/Instrumental Activities of Daily Living (IADLs):   Walk and transfer into and out of bed and chair?: Yes  Dress and groom yourself?: Yes    Bathe or shower yourself?: Yes    Feed yourself? Yes  Do your laundry/housekeeping?: Yes  Manage your money, pay your bills and track your expenses?: Yes  Make your own meals?: Yes    Do your own shopping?: Yes    ADL comments: No driving in a few years,  helps with cooking, finances, and housework    Previous Hospitalizations:   Any hospitalizations or ED visits within the last 12 months?: No      Advance Care Planning:   Living will: Yes    Durable POA for healthcare:  Yes    Advanced directive: Yes      Cognitive Screening:   Provider or family/friend/caregiver concerned regarding cognition?: Yes  Mini-Mental Status Exam (MMSE) Score: 23  Interpretation: MMSE Score 20-23: Mild cognitive impairment or early/mild dementia    PREVENTIVE SCREENINGS      Cardiovascular Screening:    General: Screening Not Indicated and History Lipid Disorder      Diabetes Screening:     General: Screening Current      Colorectal Cancer Screening:     General: Screening Not Indicated      Breast Cancer Screening:     General: Screening Not Indicated      Cervical Cancer Screening:    General: Screening Not Indicated      Osteoporosis Screening:    General: Screening Not Indicated      Abdominal Aortic Aneurysm (AAA) Screening:        General: Screening Not Indicated      Lung Cancer Screening:     General: Screening Not Indicated      Hepatitis C Screening:    General: Screening Not Indicated    Screening, Brief Intervention, and Referral to Treatment (SBIRT)    Screening  Typical number of drinks in a day: 0  Typical number of drinks in a week: 0  Interpretation: Low risk drinking behavior      AUDIT-C Screenin) How often did you have a drink containing alcohol in the past year? never  2) How many drinks did you have on a typical day when you were drinking in the past year? 0  3) How often did you have 6 or more drinks on one occasion in the past year? never    AUDIT-C Score: 0  Interpretation: Score 0-2 (female): Negative screen for alcohol misuse    Single Item Drug Screening:  How often have you used an illegal drug (including marijuana) or a prescription medication for non-medical reasons in the past year? never    Single Item Drug Screen Score: 0  Interpretation: Negative screen for possible drug use disorder      Jaron Viveros MD

## 2022-04-28 NOTE — PROGRESS NOTES
Assessment/Plan:    1  Medicare annual wellness visit, subsequent    2  Mixed hyperlipidemia  Comments:  controlled on statin  Orders:  -     simvastatin (ZOCOR) 10 mg tablet; Take 1 tablet (10 mg total) by mouth daily    3  Benign essential HTN    4  Dementia without behavioral disturbance, unspecified dementia type ([facility] [address] )    5  Acquired hypothyroidism  -     levothyroxine (Euthyrox) 25 mcg tablet; Take 1 tablet (25 mcg total) by mouth daily in the early morning  -     TSH, 3rd generation; Future; Expected date: 05/28/2022        There are no Patient Instructions on file for this visit  Return in about 6 months (around 10/28/2022)  Subjective:      Patient ID: Adam Chilel is a 64 y o  female  Chief Complaint   Patient presents with    Follow-up   [facility] Wellness Visit       Here for f/u  BP controlled  lipids at goal with statin  Here with , notices some decline  MMSE is 23/30 today  Forgets conversation  long term memory intact  Using prevagen  tsh is elevated to 7  related to dementia? The following portions of the patient's history were reviewed and updated as appropriate: allergies, current medications, past family history, past medical history, past social history, past surgical history and problem list     Review of Systems   Constitutional: Negative for fatigue and fever  HENT: Negative for congestion  Eyes: Negative for visual disturbance  Respiratory: Negative for chest tightness and shortness of breath  Cardiovascular: Negative for chest pain and palpitations  Gastrointestinal: Negative for abdominal pain  Genitourinary: Negative for difficulty urinating  Musculoskeletal: Negative for arthralgias  Neurological: Negative for headaches  Hematological: Does not bruise/bleed easily  Psychiatric/Behavioral: Positive for confusion       Answers for HPI/ROS submitted by the patient on 4/27/2022  How would you rate your overall health?: good  Compared to last year, how is your physical health?: same  In general, how satisfied are you with your life?: very satisfied  Compared to last year, how is your eyesight?: same  Compared to last year, how is your hearing?: same  Compared to last year, how is your emotional/mental health?: same  How often is anger a problem for you?: never, rarely  How often do you feel unusually tired/fatigued?: sometimes  In the past 7 days, how much pain have you experienced?: none  In the past 6 months, have you lost or gained 10 pounds without trying?: No  One or more falls in the last year: No  In the past 6 months, have you accidentally leaked urine?: Yes  Do you have trouble with the stairs inside or outside your home?: Yes  Does your home have working smoke alarms?: Yes  Does your home have a carbon monoxide monitor?: No  Which safety hazards (if any) have you experienced in your home? Please select all that apply : none  How would you describe your current diet?  Please select all that apply : Regular  In addition to prescription medications, are you taking any over-the-counter supplements?: No  Can you manage your medications?: No  Are you currently taking any opioid medications?: No  Can you walk and transfer into and out of your bed and chair?: Yes  Can you dress and groom yourself?: Yes  Can you bathe or shower yourself?: Yes  Can you feed yourself?: Yes  Can you do your laundry/ housekeeping?: Yes  Can you manage your money, pay your bills, and track your expenses?: Yes  Can you make your own meals?: Yes  Can you do your own shopping?: Yes  Within the last 12 months, have you had any hospitalizations or Emergency Department visits?: No  Do you have a living will?: Yes  Do you have a Durable POA (Power of ) for healthcare decisions?: Yes  Do you have an Advanced Directive for end of life decisions?: Yes  How often have you used an illegal drug (including marijuana) or a prescription medication for non-medical reasons in the past year?: never  What is the typical number of drinks you consume in a day?: 0  What is the typical number of drinks you consume in a week?: 0  How often did you have a drink containing alcohol in the past year?: never  How many drinks did you have on a typical day  when you were drinking in the past year?: 0  How often did you have 6 or more drinks on one occasion in the past year?: never         Current Outpatient Medications   Medication Sig Dispense Refill    Apoaequorin (Prevagen) 10 MG CAPS Take by mouth      Cholecalciferol (VITAMIN D3) 1000 units CAPS Take 2,000 Units by mouth       simvastatin (ZOCOR) 10 mg tablet Take 1 tablet (10 mg total) by mouth daily 90 tablet 1    verapamil (CALAN-SR) 180 mg CR tablet TAKE 1 TABLET DAILY 90 tablet 3    levothyroxine (Euthyrox) 25 mcg tablet Take 1 tablet (25 mcg total) by mouth daily in the early morning 30 tablet 1     No current facility-administered medications for this visit  Objective:    /76 (BP Location: Right arm, Patient Position: Sitting, Cuff Size: Standard)   Pulse 68   Temp 98 6 °F (37 °C)   Ht 5' 3" (1 6 m)   Wt 90 2 kg (198 lb 12 8 oz)   SpO2 98%   BMI 35 22 kg/m²        Physical Exam  Vitals reviewed  Constitutional:       Appearance: Normal appearance  She is well-developed  Cardiovascular:      Rate and Rhythm: Normal rate and regular rhythm  Heart sounds: Normal heart sounds  Pulmonary:      Effort: Pulmonary effort is normal       Breath sounds: Normal breath sounds  Skin:     General: Skin is warm  Neurological:      Mental Status: She is alert and oriented to person, place, and time  Psychiatric:         Mood and Affect: Mood normal          Behavior: Behavior normal          Thought Content:  Thought content normal          Judgment: Judgment normal                 Emelia Pratt MD

## 2022-05-04 DIAGNOSIS — I10 BENIGN ESSENTIAL HTN: ICD-10-CM

## 2022-06-22 ENCOUNTER — APPOINTMENT (OUTPATIENT)
Dept: LAB | Facility: MEDICAL CENTER | Age: 77
End: 2022-06-22
Payer: COMMERCIAL

## 2022-06-22 DIAGNOSIS — E03.9 ACQUIRED HYPOTHYROIDISM: ICD-10-CM

## 2022-06-22 LAB — TSH SERPL DL<=0.05 MIU/L-ACNC: 4.9 UIU/ML (ref 0.45–4.5)

## 2022-06-22 PROCEDURE — 84443 ASSAY THYROID STIM HORMONE: CPT

## 2022-06-22 PROCEDURE — 36415 COLL VENOUS BLD VENIPUNCTURE: CPT

## 2022-06-24 ENCOUNTER — TELEPHONE (OUTPATIENT)
Dept: FAMILY MEDICINE CLINIC | Facility: CLINIC | Age: 77
End: 2022-06-24

## 2022-06-24 DIAGNOSIS — E03.9 HYPOTHYROIDISM, UNSPECIFIED TYPE: Primary | ICD-10-CM

## 2022-06-24 DIAGNOSIS — E03.9 ACQUIRED HYPOTHYROIDISM: ICD-10-CM

## 2022-06-24 RX ORDER — LEVOTHYROXINE SODIUM 0.05 MG/1
50 TABLET ORAL
Qty: 90 TABLET | Refills: 0
Start: 2022-06-24 | End: 2022-07-05 | Stop reason: SDUPTHER

## 2022-06-24 NOTE — TELEPHONE ENCOUNTER
Spoke  With  Pt's    Increase  Levothyroxine  To 5ocgm  She  May  Take  Two of the  25 mcgm    Repeat  tsh in 10 weeks

## 2022-07-05 DIAGNOSIS — E03.9 ACQUIRED HYPOTHYROIDISM: ICD-10-CM

## 2022-07-05 RX ORDER — LEVOTHYROXINE SODIUM 0.05 MG/1
50 TABLET ORAL
Qty: 90 TABLET | Refills: 0
Start: 2022-07-05 | End: 2022-07-06 | Stop reason: SDUPTHER

## 2022-07-06 DIAGNOSIS — E03.9 ACQUIRED HYPOTHYROIDISM: ICD-10-CM

## 2022-07-06 RX ORDER — LEVOTHYROXINE SODIUM 0.05 MG/1
50 TABLET ORAL
Qty: 90 TABLET | Refills: 0 | Status: SHIPPED | OUTPATIENT
Start: 2022-07-06 | End: 2022-10-10 | Stop reason: SDUPTHER

## 2022-09-20 ENCOUNTER — APPOINTMENT (OUTPATIENT)
Dept: LAB | Facility: MEDICAL CENTER | Age: 77
End: 2022-09-20
Payer: COMMERCIAL

## 2022-09-20 LAB — TSH SERPL DL<=0.05 MIU/L-ACNC: 2.69 UIU/ML (ref 0.45–4.5)

## 2022-09-22 ENCOUNTER — TELEPHONE (OUTPATIENT)
Dept: FAMILY MEDICINE CLINIC | Facility: CLINIC | Age: 77
End: 2022-09-22

## 2022-09-22 NOTE — TELEPHONE ENCOUNTER
Derrick Lewis called in asking about results of thyroid lab and ask what dose of meds patient should be taking  He stated patient is taking 50mg of levothyroxine now

## 2022-10-10 DIAGNOSIS — E03.9 ACQUIRED HYPOTHYROIDISM: ICD-10-CM

## 2022-10-10 RX ORDER — LEVOTHYROXINE SODIUM 0.05 MG/1
50 TABLET ORAL
Qty: 90 TABLET | Refills: 0 | Status: SHIPPED | OUTPATIENT
Start: 2022-10-10

## 2022-10-23 DIAGNOSIS — E78.2 MIXED HYPERLIPIDEMIA: ICD-10-CM

## 2022-10-24 RX ORDER — SIMVASTATIN 10 MG
TABLET ORAL
Qty: 90 TABLET | Refills: 1 | Status: SHIPPED | OUTPATIENT
Start: 2022-10-24

## 2022-11-03 ENCOUNTER — OFFICE VISIT (OUTPATIENT)
Dept: FAMILY MEDICINE CLINIC | Facility: CLINIC | Age: 77
End: 2022-11-03

## 2022-11-03 VITALS
SYSTOLIC BLOOD PRESSURE: 120 MMHG | HEIGHT: 63 IN | WEIGHT: 204 LBS | TEMPERATURE: 97.3 F | BODY MASS INDEX: 36.14 KG/M2 | OXYGEN SATURATION: 96 % | RESPIRATION RATE: 16 BRPM | DIASTOLIC BLOOD PRESSURE: 72 MMHG | HEART RATE: 89 BPM

## 2022-11-03 DIAGNOSIS — E03.9 ACQUIRED HYPOTHYROIDISM: ICD-10-CM

## 2022-11-03 DIAGNOSIS — E55.9 VITAMIN D DEFICIENCY: ICD-10-CM

## 2022-11-03 DIAGNOSIS — E78.2 MIXED HYPERLIPIDEMIA: Primary | ICD-10-CM

## 2022-11-03 DIAGNOSIS — N18.31 STAGE 3A CHRONIC KIDNEY DISEASE (HCC): ICD-10-CM

## 2022-11-03 DIAGNOSIS — I10 BENIGN ESSENTIAL HTN: ICD-10-CM

## 2022-11-03 DIAGNOSIS — Z23 ENCOUNTER FOR IMMUNIZATION: ICD-10-CM

## 2022-11-03 NOTE — PROGRESS NOTES
Depression Screening and Follow-up Plan: Patient was screened for depression during today's encounter  They screened negative with a PHQ-2 score of 0  Assessment/Plan:    1  Mixed hyperlipidemia  -     CBC and differential; Future  -     Comprehensive metabolic panel; Future  -     Lipid panel; Future    2  Acquired hypothyroidism  -     CBC and differential; Future  -     Comprehensive metabolic panel; Future  -     Lipid panel; Future    3  Benign essential HTN  -     CBC and differential; Future  -     Comprehensive metabolic panel; Future  -     Lipid panel; Future    4  Vitamin D deficiency  -     Vitamin D 25 hydroxy; Future    5  Stage 3a chronic kidney disease ([facility] [address] )        There are no Patient Instructions on file for this visit  No follow-ups on file  Subjective:      Patient ID: Nicko Andre is a 76 y o  female  Chief Complaint   Patient presents with   • Follow-up       Here for f/u  BP controlled  TSH improved with change in dose  Pt feeling well on 50mcg dose  Desires flu shot today  Due for labs  Pt mostly sedentary  Memory is stable for now   encourages her to do puzzles, word searches  The following portions of the patient's history were reviewed and updated as appropriate: allergies, current medications, past family history, past medical history, past social history, past surgical history and problem list     Review of Systems   Constitutional: Negative for fatigue and fever  HENT: Negative for congestion  Eyes: Negative for visual disturbance  Respiratory: Negative for chest tightness and shortness of breath  Cardiovascular: Negative for chest pain and palpitations  Gastrointestinal: Negative for abdominal pain  Genitourinary: Negative for difficulty urinating  Musculoskeletal: Negative for arthralgias  Neurological: Negative for headaches  Hematological: Does not bruise/bleed easily           Current Outpatient Medications   Medication Sig Dispense Refill   • Apoaequorin (Prevagen) 10 MG CAPS Take by mouth     • Cholecalciferol (VITAMIN D3) 1000 units CAPS Take 2,000 Units by mouth      • levothyroxine (Euthyrox) 50 mcg tablet Take 1 tablet (50 mcg total) by mouth daily in the early morning 90 tablet 0   • simvastatin (ZOCOR) 10 mg tablet TAKE 1 TABLET BY MOUTH EVERY DAY 90 tablet 1   • verapamil (CALAN-SR) 180 mg CR tablet Take 1 tablet (180 mg total) by mouth daily 90 tablet 1     No current facility-administered medications for this visit  Objective:    /72 (BP Location: Right arm, Patient Position: Sitting, Cuff Size: Large)   Pulse 89   Temp (!) 97 3 °F (36 3 °C) (Temporal)   Resp 16   Ht 5' 3" (1 6 m)   Wt 92 5 kg (204 lb)   SpO2 96%   BMI 36 14 kg/m²        Physical Exam  Vitals reviewed  Constitutional:       Appearance: Normal appearance  She is well-developed  Cardiovascular:      Rate and Rhythm: Normal rate and regular rhythm  Heart sounds: Normal heart sounds  Pulmonary:      Effort: Pulmonary effort is normal       Breath sounds: Normal breath sounds  Skin:     General: Skin is warm  Neurological:      Mental Status: She is alert and oriented to person, place, and time  Psychiatric:         Mood and Affect: Mood normal          Behavior: Behavior normal          Thought Content:  Thought content normal          Judgment: Judgment normal                 Garret Hou

## 2022-11-09 ENCOUNTER — APPOINTMENT (OUTPATIENT)
Dept: LAB | Facility: MEDICAL CENTER | Age: 77
End: 2022-11-09

## 2022-11-09 DIAGNOSIS — I10 BENIGN ESSENTIAL HTN: ICD-10-CM

## 2022-11-09 DIAGNOSIS — R79.89 ABNORMAL CBC: Primary | ICD-10-CM

## 2022-11-09 DIAGNOSIS — E55.9 VITAMIN D DEFICIENCY: ICD-10-CM

## 2022-11-09 DIAGNOSIS — E78.2 MIXED HYPERLIPIDEMIA: ICD-10-CM

## 2022-11-09 DIAGNOSIS — E03.9 ACQUIRED HYPOTHYROIDISM: ICD-10-CM

## 2022-11-09 LAB
25(OH)D3 SERPL-MCNC: 50.8 NG/ML (ref 30–100)
ALBUMIN SERPL BCP-MCNC: 3.8 G/DL (ref 3.5–5)
ALP SERPL-CCNC: 98 U/L (ref 46–116)
ALT SERPL W P-5'-P-CCNC: 19 U/L (ref 12–78)
ANION GAP SERPL CALCULATED.3IONS-SCNC: 7 MMOL/L (ref 4–13)
AST SERPL W P-5'-P-CCNC: 12 U/L (ref 5–45)
BASOPHILS # BLD AUTO: 0.03 THOUSANDS/ÂΜL (ref 0–0.1)
BASOPHILS NFR BLD AUTO: 1 % (ref 0–1)
BILIRUB SERPL-MCNC: 0.41 MG/DL (ref 0.2–1)
BUN SERPL-MCNC: 19 MG/DL (ref 5–25)
CALCIUM SERPL-MCNC: 9.6 MG/DL (ref 8.3–10.1)
CHLORIDE SERPL-SCNC: 105 MMOL/L (ref 96–108)
CHOLEST SERPL-MCNC: 178 MG/DL
CO2 SERPL-SCNC: 25 MMOL/L (ref 21–32)
CREAT SERPL-MCNC: 1.26 MG/DL (ref 0.6–1.3)
EOSINOPHIL # BLD AUTO: 0.19 THOUSAND/ÂΜL (ref 0–0.61)
EOSINOPHIL NFR BLD AUTO: 3 % (ref 0–6)
ERYTHROCYTE [DISTWIDTH] IN BLOOD BY AUTOMATED COUNT: 15.2 % (ref 11.6–15.1)
GFR SERPL CREATININE-BSD FRML MDRD: 40 ML/MIN/1.73SQ M
GLUCOSE P FAST SERPL-MCNC: 147 MG/DL (ref 65–99)
HCT VFR BLD AUTO: 35.4 % (ref 34.8–46.1)
HDLC SERPL-MCNC: 82 MG/DL
HGB BLD-MCNC: 11.1 G/DL (ref 11.5–15.4)
IMM GRANULOCYTES # BLD AUTO: 0.02 THOUSAND/UL (ref 0–0.2)
IMM GRANULOCYTES NFR BLD AUTO: 0 % (ref 0–2)
LDLC SERPL CALC-MCNC: 82 MG/DL (ref 0–100)
LYMPHOCYTES # BLD AUTO: 1.7 THOUSANDS/ÂΜL (ref 0.6–4.47)
LYMPHOCYTES NFR BLD AUTO: 29 % (ref 14–44)
MCH RBC QN AUTO: 26.7 PG (ref 26.8–34.3)
MCHC RBC AUTO-ENTMCNC: 31.4 G/DL (ref 31.4–37.4)
MCV RBC AUTO: 85 FL (ref 82–98)
MONOCYTES # BLD AUTO: 0.53 THOUSAND/ÂΜL (ref 0.17–1.22)
MONOCYTES NFR BLD AUTO: 9 % (ref 4–12)
NEUTROPHILS # BLD AUTO: 3.32 THOUSANDS/ÂΜL (ref 1.85–7.62)
NEUTS SEG NFR BLD AUTO: 58 % (ref 43–75)
NONHDLC SERPL-MCNC: 96 MG/DL
NRBC BLD AUTO-RTO: 0 /100 WBCS
PLATELET # BLD AUTO: 322 THOUSANDS/UL (ref 149–390)
PMV BLD AUTO: 9.6 FL (ref 8.9–12.7)
POTASSIUM SERPL-SCNC: 3.9 MMOL/L (ref 3.5–5.3)
PROT SERPL-MCNC: 8.3 G/DL (ref 6.4–8.4)
RBC # BLD AUTO: 4.15 MILLION/UL (ref 3.81–5.12)
SODIUM SERPL-SCNC: 137 MMOL/L (ref 135–147)
TRIGL SERPL-MCNC: 69 MG/DL
WBC # BLD AUTO: 5.79 THOUSAND/UL (ref 4.31–10.16)

## 2022-11-14 DIAGNOSIS — I10 BENIGN ESSENTIAL HTN: ICD-10-CM

## 2022-12-09 ENCOUNTER — APPOINTMENT (OUTPATIENT)
Dept: LAB | Facility: MEDICAL CENTER | Age: 77
End: 2022-12-09

## 2022-12-09 DIAGNOSIS — R79.89 ABNORMAL CBC: ICD-10-CM

## 2022-12-09 LAB
ERYTHROCYTE [DISTWIDTH] IN BLOOD BY AUTOMATED COUNT: 15.5 % (ref 11.6–15.1)
HCT VFR BLD AUTO: 30.2 % (ref 34.8–46.1)
HGB BLD-MCNC: 9.4 G/DL (ref 11.5–15.4)
MCH RBC QN AUTO: 26.3 PG (ref 26.8–34.3)
MCHC RBC AUTO-ENTMCNC: 31.1 G/DL (ref 31.4–37.4)
MCV RBC AUTO: 84 FL (ref 82–98)
PLATELET # BLD AUTO: 296 THOUSANDS/UL (ref 149–390)
PMV BLD AUTO: 9.7 FL (ref 8.9–12.7)
RBC # BLD AUTO: 3.58 MILLION/UL (ref 3.81–5.12)
WBC # BLD AUTO: 6.2 THOUSAND/UL (ref 4.31–10.16)

## 2023-01-10 DIAGNOSIS — E03.9 ACQUIRED HYPOTHYROIDISM: ICD-10-CM

## 2023-01-11 RX ORDER — LEVOTHYROXINE SODIUM 0.05 MG/1
50 TABLET ORAL
Qty: 90 TABLET | Refills: 0 | Status: SHIPPED | OUTPATIENT
Start: 2023-01-11

## 2023-02-07 ENCOUNTER — OFFICE VISIT (OUTPATIENT)
Dept: GASTROENTEROLOGY | Facility: AMBULARY SURGERY CENTER | Age: 78
End: 2023-02-07

## 2023-02-07 VITALS
HEART RATE: 95 BPM | SYSTOLIC BLOOD PRESSURE: 140 MMHG | BODY MASS INDEX: 35.79 KG/M2 | DIASTOLIC BLOOD PRESSURE: 82 MMHG | HEIGHT: 63 IN | OXYGEN SATURATION: 93 % | WEIGHT: 202 LBS

## 2023-02-07 DIAGNOSIS — D50.0 IRON DEFICIENCY ANEMIA DUE TO CHRONIC BLOOD LOSS: Primary | ICD-10-CM

## 2023-02-07 NOTE — PROGRESS NOTES
[facility] - Outpatient Consultation  May Lam 76 y o  female MRN: [id number]  Encounter: [id number]      PCP: Lisa Castro MD  Referring: Lisa Castro MD  [address]  [address],  [address]      ASSESSMENT AND PLAN:      1  Iron deficiency anemia due to chronic blood loss  Progressive anemia of unknown etiology  Will obtain iron labs to assess for iron deficiency and GI blood loss  Bidirectional endoscopy including EGD/colonoscopy were offered to patient- she refuses colonoscopy based on bowel prep, risk of anesthesia and risk of procedure  She is agreeable to EGD, will schedule  She is encouraged to contact me if she notes overt GI bleeding  Will also refer to hematology to aid management of iron replacement if evidence of iron deficiency; repeat CBC at this time  - Iron Panel (Includes Ferritin, Iron Sat%, Iron, and TIBC); Future  - CBC; Future  - EGD; Future  - Ambulatory Referral to Hematology / Oncology; Future      ______________________________________________________________________    CC:  Chief Complaint   Patient presents with   • Consult     Low Hemaglobin       HPI:        Patient is a 70-year-old female referred for acute GI blood loss anemia  She has hypothyroidism, HTN, CKD stage III, HLD, vitamin D deficiency, BMI 35  She is present with her  at today's visit, who contributes to history  Hemoglobin is 9 4 on labs in December, it was 13 6 in April of last year, and then 11 1 in November (3 months ago)  She denies any overt GI bleeding  She has no abdominal pain, change in bowel habits, upper GI symptoms including GERD, difficulty swallowing or other  Last colonoscopy was more than 10 years ago  She has never previously had an endoscopy  Abdominal Pain  Pertinent negatives include no anorexia, belching, diarrhea, fever, frequency, headaches, hematuria or nausea         REVIEW OF SYSTEMS:    CONSTITUTIONAL: Denies any fever, chills, rigors, and weight loss  HEENT: No earache or tinnitus  Denies hearing loss or visual disturbances  CARDIOVASCULAR: No chest pain or palpitations  RESPIRATORY: Denies any cough, hemoptysis, shortness of breath or dyspnea on exertion  GASTROINTESTINAL: As noted in the History of Present Illness  GENITOURINARY: No problems with urination  Denies any hematuria or dysuria  NEUROLOGIC: No dizziness or vertigo, denies headaches  MUSCULOSKELETAL: Denies any muscle or joint pain  SKIN: Denies skin rashes or itching  ENDOCRINE: Denies excessive thirst  Denies intolerance to heat or cold  PSYCHOSOCIAL: Denies depression or anxiety  Denies any recent memory loss         Historical Information   Past Medical History:   Diagnosis Date   • Hypertension      Past Surgical History:   Procedure Laterality Date   • BREAST BIOPSY Left    • BREAST CYST EXCISION Right 1983   • COLONOSCOPY     • TOTAL HIP ARTHROPLASTY Bilateral    • TOTAL KNEE ARTHROPLASTY Right      Social History   Social History     Substance and Sexual Activity   Alcohol Use No     Social History     Substance and Sexual Activity   Drug Use No     Social History     Tobacco Use   Smoking Status Never   Smokeless Tobacco Never     Family History   Problem Relation Age of Onset   • No Known Problems Mother    • No Known Problems Father    • No Known Problems Maternal Grandmother    • No Known Problems Maternal Grandfather    • No Known Problems Paternal Grandmother    • No Known Problems Paternal Grandfather    • No Known Problems Maternal Aunt    • No Known Problems Son        Meds/Allergies       Current Outpatient Medications:   •  Apoaequorin (Prevagen) 10 MG CAPS  •  Cholecalciferol (VITAMIN D3) 1000 units CAPS  •  levothyroxine (Euthyrox) 50 mcg tablet  •  simvastatin (ZOCOR) 10 mg tablet  •  verapamil (CALAN-SR) 180 mg CR tablet    Allergies   Allergen Reactions   • Epinephrine    • Fluconazole    • Naproxen    • Shellfish-Derived Products - Food Allergy    • Sulfa Antibiotics            Objective     Blood pressure 140/82, pulse 95, height 5' 3" (1 6 m), weight 91 6 kg (202 lb), SpO2 93 %  Body mass index is 35 78 kg/m²  PHYSICAL EXAM:      General Appearance:   Alert, cooperative, no distress   HEENT:   Normocephalic, atraumatic, anicteric  Neck:  Supple, symmetrical, trachea midline   Lungs:   Clear to auscultation bilaterally; no rales, rhonchi or wheezing; respirations unlabored    Heart[de-identified]   Regular rate and rhythm; no murmur, rub, or gallop  Abdomen:   Soft, non-tender, non-distended; normal bowel sounds; no masses, no organomegaly    Genitalia:   Deferred    Rectal:   Deferred    Extremities:  No cyanosis, clubbing or edema    Pulses:  2+ and symmetric    Skin:  No jaundice, rashes, or lesions, + pallor    Lymph nodes:  No palpable cervical lymphadenopathy        Lab Results:     Lab Results   Component Value Date    WBC 6 20 12/09/2022    HGB 9 4 (L) 12/09/2022    HCT 30 2 (L) 12/09/2022    MCV 84 12/09/2022     12/09/2022       Lab Results   Component Value Date     01/12/2016    K 3 9 11/09/2022     11/09/2022    CO2 25 11/09/2022    BUN 19 11/09/2022    CREATININE 1 26 11/09/2022    GLUF 147 (H) 11/09/2022    CALCIUM 9 6 11/09/2022    AST 12 11/09/2022    ALT 19 11/09/2022    ALKPHOS 98 11/09/2022    PROT 7 3 01/12/2016    BILITOT 0 6 01/12/2016    EGFR 40 11/09/2022       No results found for: INR, PROTIME      Radiology Results:   No results found  Portions of the record may have been created with voice recognition software  Occasional wrong word or "sound a like" substitutions may have occurred due to the inherent limitations of voice recognition software  Read the chart carefully and recognize, using context, where substitutions have occurred

## 2023-02-07 NOTE — PATIENT INSTRUCTIONS
Scheduled date of EGD(as of today):  03/13/23  Physician performing EGD: Dr Gwendolyn Samuel  Location of EGD: [facility]  Instructions reviewed with patient by: hector munroe  Clearances:  n/a

## 2023-02-10 ENCOUNTER — APPOINTMENT (OUTPATIENT)
Dept: LAB | Facility: MEDICAL CENTER | Age: 78
End: 2023-02-10

## 2023-02-10 DIAGNOSIS — D50.0 IRON DEFICIENCY ANEMIA DUE TO CHRONIC BLOOD LOSS: ICD-10-CM

## 2023-02-10 LAB
ERYTHROCYTE [DISTWIDTH] IN BLOOD BY AUTOMATED COUNT: 16.7 % (ref 11.6–15.1)
FERRITIN SERPL-MCNC: 10 NG/ML (ref 8–388)
HCT VFR BLD AUTO: 29.8 % (ref 34.8–46.1)
HGB BLD-MCNC: 9.2 G/DL (ref 11.5–15.4)
IRON SATN MFR SERPL: 6 % (ref 15–50)
IRON SERPL-MCNC: 22 UG/DL (ref 50–170)
MCH RBC QN AUTO: 23.8 PG (ref 26.8–34.3)
MCHC RBC AUTO-ENTMCNC: 30.9 G/DL (ref 31.4–37.4)
MCV RBC AUTO: 77 FL (ref 82–98)
PLATELET # BLD AUTO: 356 THOUSANDS/UL (ref 149–390)
PMV BLD AUTO: 9.8 FL (ref 8.9–12.7)
RBC # BLD AUTO: 3.86 MILLION/UL (ref 3.81–5.12)
TIBC SERPL-MCNC: 393 UG/DL (ref 250–450)
WBC # BLD AUTO: 6.24 THOUSAND/UL (ref 4.31–10.16)

## 2023-02-15 ENCOUNTER — TELEPHONE (OUTPATIENT)
Dept: OTHER | Facility: OTHER | Age: 78
End: 2023-02-15

## 2023-02-15 ENCOUNTER — TELEPHONE (OUTPATIENT)
Dept: HEMATOLOGY ONCOLOGY | Facility: CLINIC | Age: 78
End: 2023-02-15

## 2023-02-15 NOTE — TELEPHONE ENCOUNTER
Patient  wanted to know what the appointment was for  I advised he needs to speak with the patients doctor in regards to the diagnosis   He advised he will call the doctor and call us back

## 2023-02-16 ENCOUNTER — TELEPHONE (OUTPATIENT)
Dept: HEMATOLOGY ONCOLOGY | Facility: CLINIC | Age: 78
End: 2023-02-16

## 2023-02-16 NOTE — TELEPHONE ENCOUNTER
Patient has a referral on file - Made an attempt to schedule a new patient consultation  Patients  expressed that patient is not interested in scheduling at this time and will call back if this changes

## 2023-02-20 ENCOUNTER — TELEPHONE (OUTPATIENT)
Dept: OTHER | Facility: OTHER | Age: 78
End: 2023-02-20

## 2023-02-20 NOTE — TELEPHONE ENCOUNTER
Emergency contact received a call from the [facility] [place], and was never told to expect a phone call  Would like to speak with Dr Charles Garza about the referral   Would like to find out more about her treatment concerns

## 2023-02-21 NOTE — TELEPHONE ENCOUNTER
Spoke with patients , informed him of need for hematology not "hope line" he understood and would like to schedule appt for hematology

## 2023-03-12 ENCOUNTER — ANESTHESIA EVENT (OUTPATIENT)
Dept: ANESTHESIOLOGY | Facility: HOSPITAL | Age: 78
End: 2023-03-12

## 2023-03-12 ENCOUNTER — ANESTHESIA (OUTPATIENT)
Dept: ANESTHESIOLOGY | Facility: HOSPITAL | Age: 78
End: 2023-03-12

## 2023-03-12 NOTE — ANESTHESIA PREPROCEDURE EVALUATION
Procedure:  PRE-OP ONLY  anemia  Relevant Problems   CARDIO   (+) Benign essential HTN   (+) Hyperlipidemia      ENDO   (+) Hypothyroidism      /RENAL   (+) Stage 3a chronic kidney disease ([facility] [address] )      Recent labs personally reviewed:  Lab Results   Component Value Date    WBC 6 24 02/10/2023    HGB 9 2 (L) 02/10/2023     02/10/2023     Lab Results   Component Value Date     01/12/2016    K 3 9 11/09/2022    BUN 19 11/09/2022    CREATININE 1 26 11/09/2022     No results found for: PTT   No results found for: INR    No results found for: HGBA1C

## 2023-03-13 ENCOUNTER — HOSPITAL ENCOUNTER (OUTPATIENT)
Dept: GASTROENTEROLOGY | Facility: HOSPITAL | Age: 78
Setting detail: OUTPATIENT SURGERY
Discharge: HOME | End: 2023-03-13
Attending: INTERNAL MEDICINE

## 2023-03-13 DIAGNOSIS — D50.0 IRON DEFICIENCY ANEMIA DUE TO CHRONIC BLOOD LOSS: ICD-10-CM

## 2023-03-13 NOTE — QUICK NOTE
Patient had cereal with milk at 0930 this morning  Anesthesia aware and spoke with patient   number given for patient to reschedule

## 2023-03-14 ENCOUNTER — TELEPHONE (OUTPATIENT)
Dept: GASTROENTEROLOGY | Facility: AMBULARY SURGERY CENTER | Age: 78
End: 2023-03-14

## 2023-03-14 NOTE — TELEPHONE ENCOUNTER
Pt ate breakfast prior to EGD procedure 3/13/2023 w/ dr Jonathan Martini at [facility]/pt rescheduled to 5/15/2023 @ [facility]

## 2023-03-29 ENCOUNTER — CONSULT (OUTPATIENT)
Dept: HEMATOLOGY ONCOLOGY | Facility: CLINIC | Age: 78
End: 2023-03-29

## 2023-03-29 ENCOUNTER — APPOINTMENT (OUTPATIENT)
Dept: LAB | Facility: CLINIC | Age: 78
End: 2023-03-29

## 2023-03-29 VITALS
DIASTOLIC BLOOD PRESSURE: 66 MMHG | TEMPERATURE: 97.2 F | RESPIRATION RATE: 16 BRPM | SYSTOLIC BLOOD PRESSURE: 140 MMHG | BODY MASS INDEX: 35.61 KG/M2 | OXYGEN SATURATION: 97 % | WEIGHT: 201 LBS | HEIGHT: 63 IN | HEART RATE: 115 BPM

## 2023-03-29 DIAGNOSIS — D50.9 MICROCYTIC ANEMIA: ICD-10-CM

## 2023-03-29 DIAGNOSIS — D50.0 IRON DEFICIENCY ANEMIA DUE TO CHRONIC BLOOD LOSS: ICD-10-CM

## 2023-03-29 DIAGNOSIS — D50.9 MICROCYTIC ANEMIA: Primary | ICD-10-CM

## 2023-03-29 LAB
ALBUMIN SERPL BCP-MCNC: 4 G/DL (ref 3.5–5)
ALP SERPL-CCNC: 88 U/L (ref 34–104)
ALT SERPL W P-5'-P-CCNC: 11 U/L (ref 7–52)
ANION GAP SERPL CALCULATED.3IONS-SCNC: 12 MMOL/L (ref 4–13)
AST SERPL W P-5'-P-CCNC: 13 U/L (ref 13–39)
BASOPHILS # BLD AUTO: 0.04 THOUSANDS/ÂΜL (ref 0–0.1)
BASOPHILS NFR BLD AUTO: 1 % (ref 0–1)
BILIRUB SERPL-MCNC: 0.31 MG/DL (ref 0.2–1)
BUN SERPL-MCNC: 16 MG/DL (ref 5–25)
CALCIUM SERPL-MCNC: 9.5 MG/DL (ref 8.4–10.2)
CHLORIDE SERPL-SCNC: 103 MMOL/L (ref 96–108)
CO2 SERPL-SCNC: 24 MMOL/L (ref 21–32)
CREAT SERPL-MCNC: 1.1 MG/DL (ref 0.6–1.3)
EOSINOPHIL # BLD AUTO: 0.19 THOUSAND/ÂΜL (ref 0–0.61)
EOSINOPHIL NFR BLD AUTO: 4 % (ref 0–6)
ERYTHROCYTE [DISTWIDTH] IN BLOOD BY AUTOMATED COUNT: 24.2 % (ref 11.6–15.1)
FERRITIN SERPL-MCNC: 17 NG/ML (ref 8–388)
FOLATE SERPL-MCNC: 13.8 NG/ML (ref 3.1–17.5)
GFR SERPL CREATININE-BSD FRML MDRD: 47 ML/MIN/1.73SQ M
GLUCOSE SERPL-MCNC: 114 MG/DL (ref 65–140)
HCT VFR BLD AUTO: 32.7 % (ref 34.8–46.1)
HGB BLD-MCNC: 9.5 G/DL (ref 11.5–15.4)
IMM GRANULOCYTES # BLD AUTO: 0.01 THOUSAND/UL (ref 0–0.2)
IMM GRANULOCYTES NFR BLD AUTO: 0 % (ref 0–2)
LYMPHOCYTES # BLD AUTO: 1.32 THOUSANDS/ÂΜL (ref 0.6–4.47)
LYMPHOCYTES NFR BLD AUTO: 25 % (ref 14–44)
MCH RBC QN AUTO: 23.8 PG (ref 26.8–34.3)
MCHC RBC AUTO-ENTMCNC: 29.1 G/DL (ref 31.4–37.4)
MCV RBC AUTO: 82 FL (ref 82–98)
MONOCYTES # BLD AUTO: 0.69 THOUSAND/ÂΜL (ref 0.17–1.22)
MONOCYTES NFR BLD AUTO: 13 % (ref 4–12)
NEUTROPHILS # BLD AUTO: 3.04 THOUSANDS/ÂΜL (ref 1.85–7.62)
NEUTS SEG NFR BLD AUTO: 57 % (ref 43–75)
NRBC BLD AUTO-RTO: 0 /100 WBCS
PLATELET # BLD AUTO: 385 THOUSANDS/UL (ref 149–390)
PMV BLD AUTO: 9.6 FL (ref 8.9–12.7)
POTASSIUM SERPL-SCNC: 4.4 MMOL/L (ref 3.5–5.3)
PROT SERPL-MCNC: 7.6 G/DL (ref 6.4–8.4)
RBC # BLD AUTO: 3.99 MILLION/UL (ref 3.81–5.12)
SODIUM SERPL-SCNC: 139 MMOL/L (ref 135–147)
VIT B12 SERPL-MCNC: 564 PG/ML (ref 100–900)
WBC # BLD AUTO: 5.29 THOUSAND/UL (ref 4.31–10.16)

## 2023-03-29 NOTE — PROGRESS NOTES
[id number] [facility]  [facility] [address] [address]  Hematology Ambulatory Consult  Kindra Jose, 1945, [id number]  3/29/2023    Assessment/Plan:  1  Microcytic anemia  2  Iron deficiency anemia due to chronic blood loss  Ms Hi Burris is a 78-year-old female seen in consultation for iron deficiency anemia  She was first noted to have a decrease in her hemoglobin back in November 2022  Most recently in February her hemoglobin is noted to be 9 2 with a ferritin of 10 and iron saturation of 6% she started on oral iron and is tolerating this well without any side effects  She was seen by gastroenterology for further evaluation for chronic blood loss and has declined colonoscopy at this time  Unfortunately she ate breakfast the day of her EGD and therefore this needed to be rescheduled for May  Since she has been taking oral iron for about 1 month she will go for repeat labs today after leaving the office to assess response  I discussed with the patient and her  that her goal ferritin should be between 30 and 50 with a normal hemoglobin  If oral iron has not improved her ferritin drastically we will discuss IV iron for supplementation  We will also assess for B12 and folate deficiency today  I stressed the importance of full GI work-up to assess the cause for her iron deficiency including a colonoscopy, EGD, +/- capsule study  Her and her  will discuss this further and reach out to gastroenterology if they change their mind prior to May  - Ambulatory Referral to Hematology / Oncology  - CBC and differential; Future  - Comprehensive metabolic panel; Future  - Iron Panel (Includes Ferritin, Iron Sat%, Iron, and TIBC); Future  - Vitamin B12; Future  - Methylmalonic acid, serum; Future  - Folate; Future    The patient is scheduled for follow-up in approximately 4 months  Patient voiced agreement and understanding to the above  Patient knows to call the Hematology/Oncology office with any questions and concerns regarding the above  Barrier(s) to care: None  The patient is able to self care     -------------------------------------------------------------------------------------------------------    Chief Complaint   Patient presents with   • Consult       Referring provider:  Lobo Morrison MD  [facility] Sandip Gonzales,  [address] [place] [address]    History of present illness:  Rickie Brown is a 75-year-old female with past medical history of hypertension, hypothyroidism, hyperlipidemia, and dementia  She is seen in consultation today along with her  who provided most of the history for iron deficiency anemia  She was noted to have a decline in her hemoglobin back in November 2022  Most recently CBC demonstrated a hemoglobin of 9 2 with an MCV of 77  Platelets and white blood cells are within normal limits  Iron panel had a ferritin of 10 with an iron saturation 6%, TIBC 393, serum iron 22  She was started on oral iron at that time and has been tolerating it once daily for the last 1 month  She previously donated blood regularly and was denied a few times due to having low iron but has never required IV iron or blood transfusion in the past   She denies ever being told that she or a family member is diagnosed with thalassemia  She denies history of gastric bypass or colon resection  She does not believe she ever had a colonoscopy  She was seen by gastroenterology and has declined colonoscopy due to the prep  She was scheduled for EGD last month but had eaten breakfast the morning of her procedure which was then canceled  She is rescheduled for EGD on 5/15/2023  She denies pagophagia, pica, restless leg syndrome, brittle nails, thinning hair, or pallor  She denies coffee ground stools, tarry stools, bright red blood per rectum, hematuria, or vaginal bleeding    She denies fatigue, lightheadedness, dizziness, shortness of breath, WILKINSON, palpitations, or chest pain  She denies fevers, chills, unintentional weight loss, abdominal pain/distension, nausea, vomiting, constipation, diarrhea, petechiae/purpura, unexplained ecchymosis, or LE swelling  2/10/2023: Hemoglobin 9 2, MCV 77, platelets 033, WBC 0 34   Ferritin 10, iron saturation 6%, TIBC 393, serum iron 22  11/9/2022: Hemoglobin 11 1, MCV 85, platelets 283, WBC 4 20  4/11/2022: Hemoglobin 13 6, MCV 88, platelets 614, WBC 5 33    Review of Systems   Constitutional: Negative for activity change, appetite change, diaphoresis, fatigue, fever and unexpected weight change  HENT: Negative for trouble swallowing and voice change  Eyes: Negative for photophobia and visual disturbance  Respiratory: Negative for cough, chest tightness and shortness of breath  Cardiovascular: Negative for chest pain, palpitations and leg swelling  Gastrointestinal: Negative for abdominal distention, abdominal pain, anal bleeding, blood in stool, constipation, diarrhea, nausea and vomiting  Endocrine: Negative for cold intolerance and heat intolerance  Genitourinary: Negative for dysuria, hematuria and urgency  Musculoskeletal: Negative for arthralgias, back pain, gait problem, joint swelling and myalgias  Skin: Negative for pallor and rash  Neurological: Negative for dizziness, weakness, light-headedness, numbness and headaches  Hematological: Negative for adenopathy  Does not bruise/bleed easily  Psychiatric/Behavioral: Negative for confusion and sleep disturbance         Patient Active Problem List   Diagnosis   • Benign essential HTN   • Hyperlipidemia   • Hypothyroidism   • Obesity   • Vitamin D deficiency   • Medicare annual wellness visit, subsequent   • Screening for breast cancer   • Stage 3a chronic kidney disease ([facility] [address] )       Past Medical History:   Diagnosis Date   • Hypertension        Past Surgical History:   Procedure Laterality Date   • BREAST BIOPSY Left • BREAST CYST EXCISION Right 1983   • COLONOSCOPY     • TOTAL HIP ARTHROPLASTY Bilateral    • TOTAL KNEE ARTHROPLASTY Right        Family History   Problem Relation Age of Onset   • No Known Problems Mother    • No Known Problems Father    • No Known Problems Maternal Grandmother    • No Known Problems Maternal Grandfather    • No Known Problems Paternal Grandmother    • No Known Problems Paternal Grandfather    • No Known Problems Maternal Aunt    • No Known Problems Son        Social History     Socioeconomic History   • Marital status: /Civil Union     Spouse name: None   • Number of children: None   • Years of education: None   • Highest education level: None   Occupational History   • None   Tobacco Use   • Smoking status: Never   • Smokeless tobacco: Never   Vaping Use   • Vaping Use: Never used   Substance and Sexual Activity   • Alcohol use: No   • Drug use: No   • Sexual activity: None   Other Topics Concern   • None   Social History Narrative   • None     Social Determinants of Health     Financial Resource Strain: Not on file   Food Insecurity: Not on file   Transportation Needs: Not on file   Physical Activity: Not on file   Stress: Not on file   Social Connections: Not on file   Intimate Partner Violence: Not on file   Housing Stability: Not on file         Current Outpatient Medications:   •  Apoaequorin (Prevagen) 10 MG CAPS, Take by mouth, Disp: , Rfl:   •  Cholecalciferol (VITAMIN D3) 1000 units CAPS, Take 2,000 Units by mouth , Disp: , Rfl:   •  levothyroxine (Euthyrox) 50 mcg tablet, Take 1 tablet (50 mcg total) by mouth daily in the early morning, Disp: 90 tablet, Rfl: 0  •  simvastatin (ZOCOR) 10 mg tablet, TAKE 1 TABLET BY MOUTH EVERY DAY, Disp: 90 tablet, Rfl: 1  •  verapamil (CALAN-SR) 180 mg CR tablet, TAKE 1 TABLET BY MOUTH EVERY DAY, Disp: 90 tablet, Rfl: 1    Allergies   Allergen Reactions   • Epinephrine    • Fluconazole    • Naproxen    • Shellfish-Derived Products - Food "Allergy    • Sulfa Antibiotics        Objective:  /66 (BP Location: Right arm, Patient Position: Sitting, Cuff Size: Large)   Pulse (!) 115   Temp (!) 97 2 °F (36 2 °C) (Temporal)   Resp 16   Ht 5' 3\" (1 6 m)   Wt 91 2 kg (201 lb)   SpO2 97%   BMI 35 61 kg/m²   Physical Exam  Constitutional:       General: She is not in acute distress  Appearance: Normal appearance  She is normal weight  She is not ill-appearing  HENT:      Head: Normocephalic and atraumatic  Eyes:      Extraocular Movements: Extraocular movements intact  Conjunctiva/sclera: Conjunctivae normal       Pupils: Pupils are equal, round, and reactive to light  Cardiovascular:      Rate and Rhythm: Normal rate and regular rhythm  Pulses: Normal pulses  Heart sounds: Normal heart sounds  No murmur heard  Pulmonary:      Effort: Pulmonary effort is normal  No respiratory distress  Breath sounds: Normal breath sounds  Abdominal:      General: Abdomen is flat  Bowel sounds are normal  There is no distension  Palpations: Abdomen is soft  Tenderness: There is no abdominal tenderness  Musculoskeletal:         General: Normal range of motion  Cervical back: Normal range of motion  No tenderness  Right lower leg: No edema  Left lower leg: No edema  Lymphadenopathy:      Cervical: No cervical adenopathy  Skin:     General: Skin is warm and dry  Capillary Refill: Capillary refill takes less than 2 seconds  Coloration: Skin is not jaundiced or pale  Neurological:      General: No focal deficit present  Mental Status: She is alert and oriented to person, place, and time  Mental status is at baseline  Cranial Nerves: No cranial nerve deficit  Motor: No weakness  Gait: Gait normal    Psychiatric:         Mood and Affect: Mood normal          Behavior: Behavior normal          Thought Content:  Thought content normal          Judgment: Judgment normal  " Result Review  Labs:  No visits with results within 1 Month(s) from this visit  Latest known visit with results is:   Appointment on 02/10/2023   Component Date Value Ref Range Status   • WBC 02/10/2023 6 24  4 31 - 10 16 Thousand/uL Final   • RBC 02/10/2023 3 86  3 81 - 5 12 Million/uL Final   • Hemoglobin 02/10/2023 9 2 (L)  11 5 - 15 4 g/dL Final   • Hematocrit 02/10/2023 29 8 (L)  34 8 - 46 1 % Final   • MCV 02/10/2023 77 (L)  82 - 98 fL Final   • MCH 02/10/2023 23 8 (L)  26 8 - 34 3 pg Final   • MCHC 02/10/2023 30 9 (L)  31 4 - 37 4 g/dL Final   • RDW 02/10/2023 16 7 (H)  11 6 - 15 1 % Final   • Platelets 02/24/5242 356  149 - 390 Thousands/uL Final   • MPV 02/10/2023 9 8  8 9 - 12 7 fL Final   • Iron Saturation 02/10/2023 6 (L)  15 - 50 % Final   • TIBC 02/10/2023 393  250 - 450 ug/dL Final   • Iron 02/10/2023 22 (L)  50 - 170 ug/dL Final    Patients treated with metal-binding drugs (ie  Deferoxamine) may have depressed iron values  • Ferritin 02/10/2023 10  8 - 388 ng/mL Final       Imaging:  No relevant imaging to review        Please note: This report has been generated by a voice recognition software system  Therefore there may be syntax, spelling, and/or grammatical errors  Please call if you have any questions

## 2023-03-31 LAB
IRON SATN MFR SERPL: 51 % (ref 15–50)
IRON SERPL-MCNC: 191 UG/DL (ref 50–170)
TIBC SERPL-MCNC: 373 UG/DL (ref 250–450)

## 2023-04-03 LAB — METHYLMALONATE SERPL-SCNC: 730 NMOL/L (ref 0–378)

## 2023-05-01 DIAGNOSIS — E78.2 MIXED HYPERLIPIDEMIA: ICD-10-CM

## 2023-05-01 DIAGNOSIS — I10 BENIGN ESSENTIAL HTN: ICD-10-CM

## 2023-05-01 RX ORDER — SIMVASTATIN 10 MG
TABLET ORAL
Qty: 90 TABLET | Refills: 1 | Status: SHIPPED | OUTPATIENT
Start: 2023-05-01

## 2023-05-05 ENCOUNTER — TELEPHONE (OUTPATIENT)
Dept: GASTROENTEROLOGY | Facility: AMBULARY SURGERY CENTER | Age: 78
End: 2023-05-05

## 2023-05-05 ENCOUNTER — TELEPHONE (OUTPATIENT)
Dept: OTHER | Facility: OTHER | Age: 78
End: 2023-05-05

## 2023-05-05 DIAGNOSIS — D50.0 IRON DEFICIENCY ANEMIA DUE TO CHRONIC BLOOD LOSS: Primary | ICD-10-CM

## 2023-05-05 NOTE — TELEPHONE ENCOUNTER
Spoke with patients , Leif Mallory, he is unsure if waiting until August for a GI appointment is appropriate for patient  He explains she has been on iron supplements but no recent blood work has been done to see it is working  He made appt with hematology which is not until August  Denies any bloody/ black BMs and no nausea or vomiting, no lightheadedness/ dizziness  I advised Leif Parekhs to contact GI if he notes any GI bleeding- black/ bloody stools  I advised him to keep EGD appt for 5/18 and we can follow up with an appt in June after procedures and biopsies are back  He is agreeable  Please advise if patient should have iron panel or CBC done again before EGD?

## 2023-05-08 ENCOUNTER — APPOINTMENT (OUTPATIENT)
Dept: LAB | Facility: MEDICAL CENTER | Age: 78
End: 2023-05-08

## 2023-05-08 ENCOUNTER — TELEPHONE (OUTPATIENT)
Dept: FAMILY MEDICINE CLINIC | Facility: CLINIC | Age: 78
End: 2023-05-08

## 2023-05-08 DIAGNOSIS — D50.0 IRON DEFICIENCY ANEMIA DUE TO CHRONIC BLOOD LOSS: ICD-10-CM

## 2023-05-08 LAB
BASOPHILS # BLD AUTO: 0.03 THOUSANDS/ÂΜL (ref 0–0.1)
BASOPHILS NFR BLD AUTO: 1 % (ref 0–1)
EOSINOPHIL # BLD AUTO: 0.19 THOUSAND/ÂΜL (ref 0–0.61)
EOSINOPHIL NFR BLD AUTO: 4 % (ref 0–6)
ERYTHROCYTE [DISTWIDTH] IN BLOOD BY AUTOMATED COUNT: 21.6 % (ref 11.6–15.1)
HCT VFR BLD AUTO: 34.6 % (ref 34.8–46.1)
HGB BLD-MCNC: 10.7 G/DL (ref 11.5–15.4)
IMM GRANULOCYTES # BLD AUTO: 0.02 THOUSAND/UL (ref 0–0.2)
IMM GRANULOCYTES NFR BLD AUTO: 0 % (ref 0–2)
LYMPHOCYTES # BLD AUTO: 1.82 THOUSANDS/ÂΜL (ref 0.6–4.47)
LYMPHOCYTES NFR BLD AUTO: 34 % (ref 14–44)
MCH RBC QN AUTO: 26 PG (ref 26.8–34.3)
MCHC RBC AUTO-ENTMCNC: 30.9 G/DL (ref 31.4–37.4)
MCV RBC AUTO: 84 FL (ref 82–98)
MONOCYTES # BLD AUTO: 0.49 THOUSAND/ÂΜL (ref 0.17–1.22)
MONOCYTES NFR BLD AUTO: 9 % (ref 4–12)
NEUTROPHILS # BLD AUTO: 2.81 THOUSANDS/ÂΜL (ref 1.85–7.62)
NEUTS SEG NFR BLD AUTO: 52 % (ref 43–75)
NRBC BLD AUTO-RTO: 0 /100 WBCS
PLATELET # BLD AUTO: 283 THOUSANDS/UL (ref 149–390)
PMV BLD AUTO: 9.9 FL (ref 8.9–12.7)
RBC # BLD AUTO: 4.11 MILLION/UL (ref 3.81–5.12)
WBC # BLD AUTO: 5.36 THOUSAND/UL (ref 4.31–10.16)

## 2023-05-08 NOTE — TELEPHONE ENCOUNTER
of patient calling back stating that he missed call from Dr Hanny Cardenas office  I relayed Maddie Pennington PA-C message from below that she wanted to give him from Dr Kylee De La Vega   understood and will wait to get the time of her test on Monday

## 2023-05-08 NOTE — TELEPHONE ENCOUNTER
As Dr Franky Clark wrote in note, please let patient know that her hemoglobin is stable/improved which is good news  We will proceed with EGD as scheduled

## 2023-05-15 ENCOUNTER — HOSPITAL ENCOUNTER (OUTPATIENT)
Dept: GASTROENTEROLOGY | Facility: HOSPITAL | Age: 78
Setting detail: OUTPATIENT SURGERY
Discharge: HOME | End: 2023-05-15
Attending: INTERNAL MEDICINE

## 2023-05-15 ENCOUNTER — ANESTHESIA EVENT (OUTPATIENT)
Dept: GASTROENTEROLOGY | Facility: HOSPITAL | Age: 78
End: 2023-05-15

## 2023-05-15 ENCOUNTER — ANESTHESIA (OUTPATIENT)
Dept: GASTROENTEROLOGY | Facility: HOSPITAL | Age: 78
End: 2023-05-15

## 2023-05-15 VITALS
HEIGHT: 62 IN | BODY MASS INDEX: 36.62 KG/M2 | RESPIRATION RATE: 16 BRPM | SYSTOLIC BLOOD PRESSURE: 122 MMHG | TEMPERATURE: 97.6 F | HEART RATE: 71 BPM | OXYGEN SATURATION: 96 % | WEIGHT: 199 LBS | DIASTOLIC BLOOD PRESSURE: 72 MMHG

## 2023-05-15 DIAGNOSIS — K44.9 HIATAL HERNIA: ICD-10-CM

## 2023-05-15 DIAGNOSIS — K29.60 EROSIVE GASTRITIS: Primary | ICD-10-CM

## 2023-05-15 RX ORDER — OMEPRAZOLE 40 MG/1
40 CAPSULE, DELAYED RELEASE ORAL DAILY
Qty: 90 CAPSULE | Refills: 3 | Status: SHIPPED | OUTPATIENT
Start: 2023-05-15

## 2023-05-15 RX ORDER — LIDOCAINE HYDROCHLORIDE 10 MG/ML
INJECTION, SOLUTION EPIDURAL; INFILTRATION; INTRACAUDAL; PERINEURAL AS NEEDED
Status: DISCONTINUED | OUTPATIENT
Start: 2023-05-15 | End: 2023-05-15

## 2023-05-15 RX ORDER — PROPOFOL 10 MG/ML
INJECTION, EMULSION INTRAVENOUS AS NEEDED
Status: DISCONTINUED | OUTPATIENT
Start: 2023-05-15 | End: 2023-05-15

## 2023-05-15 RX ORDER — FERROUS SULFATE 325(65) MG
325 TABLET ORAL
COMMUNITY

## 2023-05-15 RX ORDER — GLYCOPYRROLATE 0.2 MG/ML
INJECTION INTRAMUSCULAR; INTRAVENOUS AS NEEDED
Status: DISCONTINUED | OUTPATIENT
Start: 2023-05-15 | End: 2023-05-15

## 2023-05-15 RX ORDER — SODIUM CHLORIDE, SODIUM LACTATE, POTASSIUM CHLORIDE, CALCIUM CHLORIDE 600; 310; 30; 20 MG/100ML; MG/100ML; MG/100ML; MG/100ML
INJECTION, SOLUTION INTRAVENOUS CONTINUOUS PRN
Status: DISCONTINUED | OUTPATIENT
Start: 2023-05-15 | End: 2023-05-15

## 2023-05-15 RX ADMIN — PROPOFOL 100 MG: 10 INJECTION, EMULSION INTRAVENOUS at 09:48

## 2023-05-15 RX ADMIN — LIDOCAINE HYDROCHLORIDE 50 MG: 10 INJECTION, SOLUTION EPIDURAL; INFILTRATION; INTRACAUDAL at 09:48

## 2023-05-15 RX ADMIN — GLYCOPYRROLATE 0.1 MG: 0.2 INJECTION, SOLUTION INTRAMUSCULAR; INTRAVENOUS at 09:52

## 2023-05-15 RX ADMIN — PROPOFOL 50 MG: 10 INJECTION, EMULSION INTRAVENOUS at 09:51

## 2023-05-15 RX ADMIN — PROPOFOL 30 MG: 10 INJECTION, EMULSION INTRAVENOUS at 09:54

## 2023-05-15 RX ADMIN — SODIUM CHLORIDE, SODIUM LACTATE, POTASSIUM CHLORIDE, AND CALCIUM CHLORIDE: .6; .31; .03; .02 INJECTION, SOLUTION INTRAVENOUS at 09:42

## 2023-05-15 NOTE — ANESTHESIA PREPROCEDURE EVALUATION
Procedure:  EGD  anemia  Relevant Problems   CARDIO   (+) Benign essential HTN   (+) Hyperlipidemia      ENDO   (+) Hypothyroidism      /RENAL   (+) Stage 3a chronic kidney disease ([facility] [address] )      Recent labs personally reviewed:  Lab Results   Component Value Date    WBC 5 36 05/08/2023    HGB 10 7 (L) 05/08/2023     05/08/2023     Lab Results   Component Value Date     01/12/2016    K 4 4 03/29/2023    BUN 16 03/29/2023    CREATININE 1 10 03/29/2023     No results found for: PTT   No results found for: INR    No results found for: HGBA1C      Physical Exam    Airway    Mallampati score: II         Dental       Cardiovascular  Cardiovascular exam normal    Pulmonary  Pulmonary exam normal     Other Findings        Anesthesia Plan  ASA Score- 3     Anesthesia Type- IV sedation with anesthesia with ASA Monitors  Additional Monitors:   Airway Plan:           Plan Factors-Exercise tolerance (METS): >4 METS  Chart reviewed  EKG reviewed  Imaging results reviewed  Existing labs reviewed  Patient summary reviewed  Patient is not a current smoker  Patient not instructed to abstain from smoking on day of procedure  Induction- intravenous  Postoperative Plan-     Informed Consent- Anesthetic plan and risks discussed with patient

## 2023-05-15 NOTE — ANESTHESIA POSTPROCEDURE EVALUATION
Post-Op Assessment Note    CV Status:  Stable    Pain management: adequate     Mental Status:  Sleepy   Hydration Status:  Euvolemic   PONV Controlled:  Controlled   Airway Patency:  Patent      Post Op Vitals Reviewed: Yes            No notable events documented      BP   156/77   Temp     Pulse  81   Resp      SpO2   97

## 2023-05-15 NOTE — H&P
"History and Physical -  [facility]  Arpit Mcleod 76 y o  female MRN: [id number]                  HPI: Arpit Mcleod is a 76y o  year old female who presents for anemia      REVIEW OF SYSTEMS: Per the HPI, and otherwise unremarkable      Historical Information   Past Medical History:   Diagnosis Date   • Disease of thyroid gland    • Hyperlipidemia    • Hypertension      Past Surgical History:   Procedure Laterality Date   • BREAST BIOPSY Left    • BREAST CYST EXCISION Right 1983   • COLONOSCOPY     • TOTAL HIP ARTHROPLASTY Bilateral    • TOTAL KNEE ARTHROPLASTY Right      Social History   Social History     Substance and Sexual Activity   Alcohol Use No     Social History     Substance and Sexual Activity   Drug Use No     Social History     Tobacco Use   Smoking Status Never   Smokeless Tobacco Never     Family History   Problem Relation Age of Onset   • No Known Problems Mother    • No Known Problems Father    • No Known Problems Maternal Grandmother    • No Known Problems Maternal Grandfather    • No Known Problems Paternal Grandmother    • No Known Problems Paternal Grandfather    • No Known Problems Maternal Aunt    • No Known Problems Son        Meds/Allergies       Current Outpatient Medications:   •  Apoaequorin (Prevagen) 10 MG CAPS  •  Cholecalciferol (VITAMIN D3) 1000 units CAPS  •  ferrous sulfate 325 (65 Fe) mg tablet  •  levothyroxine 50 mcg tablet  •  simvastatin (ZOCOR) 10 mg tablet  •  verapamil (CALAN-SR) 180 mg CR tablet    Allergies   Allergen Reactions   • Epinephrine    • Fluconazole    • Naproxen    • Shellfish-Derived Products - Food Allergy    • Sulfa Antibiotics        Objective     /86   Pulse 64   Temp (!) 97 1 °F (36 2 °C) (Temporal)   Resp 16   Ht 5' 2\" (1 575 m)   Wt 90 3 kg (199 lb)   SpO2 97%   BMI 36 40 kg/m²       PHYSICAL EXAM    Gen: NAD  Head: NCAT  CV: RRR  CHEST: Clear  ABD: soft, NT/ND  EXT: no edema      ASSESSMENT/PLAN:  This is a 77 " y o  year old female here for EGD, and she is stable and optimized for her procedure

## 2023-06-08 ENCOUNTER — TELEPHONE (OUTPATIENT)
Dept: OTHER | Facility: OTHER | Age: 78
End: 2023-06-08

## 2023-06-08 NOTE — TELEPHONE ENCOUNTER
Patients  is calling upset that his wife's appt for today has been cancelled for the second time and states he even changed providers and locations to be more flexible  He also states he cannot have very early am appts as patient has dementia and he has to get her ready in the am   Please call to discuss

## 2023-06-14 ENCOUNTER — TELEPHONE (OUTPATIENT)
Dept: HEMATOLOGY ONCOLOGY | Facility: CLINIC | Age: 78
End: 2023-06-14

## 2023-06-14 DIAGNOSIS — D50.8 IRON DEFICIENCY ANEMIA SECONDARY TO INADEQUATE DIETARY IRON INTAKE: Primary | ICD-10-CM

## 2023-06-14 DIAGNOSIS — E53.8 B12 DEFICIENCY: ICD-10-CM

## 2023-06-14 RX ORDER — CYANOCOBALAMIN 1000 UG/ML
1000 INJECTION, SOLUTION INTRAMUSCULAR; SUBCUTANEOUS ONCE
OUTPATIENT
Start: 2023-06-27

## 2023-06-14 RX ORDER — SODIUM CHLORIDE 9 MG/ML
20 INJECTION, SOLUTION INTRAVENOUS ONCE
OUTPATIENT
Start: 2023-06-27

## 2023-06-14 RX ORDER — CYANOCOBALAMIN 1000 UG/ML
1000 INJECTION, SOLUTION INTRAMUSCULAR; SUBCUTANEOUS ONCE
OUTPATIENT
Start: 2023-08-14

## 2023-06-14 NOTE — TELEPHONE ENCOUNTER
What would be a preferred day of the week that would work best for your infusion appointment? any  Do you prefer mornings or afternoons for your appointments? PM  Are there any days or dates that do not work for your schedule, including any upcoming vacations? N/a  We are going to try our best to schedule you at the infusion center closest to your home  In the event that we are unable to what would be your next preferred infusion site or sites? [facility]    [facility] - no second site pref will wait for availability at [facility]    Do you have transportation to take you to all of your appointments?  yes

## 2023-06-14 NOTE — TELEPHONE ENCOUNTER
----- Message from [facility] sent at 6/14/2023  9:26 AM EDT -----  Patient did not read [employer] message  Please call them and let them know my recommendations  Thanks      ----- Message -----  From: SONDRA Morocho  Sent: 6/12/2023   3:23 PM EDT  To: SONDRA Morocho,   The iron did not improve much with the oral iron  I would suggest proceeding with IV iron  You would also need B12 injections as you do appear to be B12 deficient as well  Please call or message the office that you are okay with proceeding with this and I will place the orders and we will get this scheduled for you  Phoned patient at request of Armin Crespo  Spoke to patients   Aware and agreeable to IV iron and B12

## 2023-06-27 ENCOUNTER — HOSPITAL ENCOUNTER (OUTPATIENT)
Dept: INFUSION CENTER | Facility: CLINIC | Age: 78
Discharge: HOME | End: 2023-06-27
Payer: COMMERCIAL

## 2023-06-27 VITALS
HEART RATE: 80 BPM | DIASTOLIC BLOOD PRESSURE: 86 MMHG | RESPIRATION RATE: 18 BRPM | TEMPERATURE: 97.6 F | SYSTOLIC BLOOD PRESSURE: 123 MMHG

## 2023-06-27 DIAGNOSIS — D50.8 IRON DEFICIENCY ANEMIA SECONDARY TO INADEQUATE DIETARY IRON INTAKE: Primary | ICD-10-CM

## 2023-06-27 DIAGNOSIS — E53.8 B12 DEFICIENCY: ICD-10-CM

## 2023-06-27 RX ORDER — CYANOCOBALAMIN 1000 UG/ML
1000 INJECTION, SOLUTION INTRAMUSCULAR; SUBCUTANEOUS ONCE
Status: COMPLETED | OUTPATIENT
Start: 2023-06-27 | End: 2023-06-27

## 2023-06-27 RX ORDER — CYANOCOBALAMIN 1000 UG/ML
1000 INJECTION, SOLUTION INTRAMUSCULAR; SUBCUTANEOUS ONCE
Status: CANCELLED | OUTPATIENT
Start: 2023-07-07

## 2023-06-27 RX ORDER — SODIUM CHLORIDE 9 MG/ML
20 INJECTION, SOLUTION INTRAVENOUS ONCE
Status: CANCELLED | OUTPATIENT
Start: 2023-07-07

## 2023-06-27 RX ORDER — SODIUM CHLORIDE 9 MG/ML
20 INJECTION, SOLUTION INTRAVENOUS ONCE
Status: COMPLETED | OUTPATIENT
Start: 2023-06-27 | End: 2023-06-27

## 2023-06-27 RX ADMIN — IRON SUCROSE 200 MG: 20 INJECTION, SOLUTION INTRAVENOUS at 15:20

## 2023-06-27 RX ADMIN — SODIUM CHLORIDE 20 ML/HR: 0.9 INJECTION, SOLUTION INTRAVENOUS at 15:20

## 2023-06-27 RX ADMIN — CYANOCOBALAMIN 1000 MCG: 1000 INJECTION, SOLUTION INTRAMUSCULAR; SUBCUTANEOUS at 15:20

## 2023-06-27 NOTE — PROGRESS NOTES
Patient here for Venofer push and B12, pt tolerated push without complications   Patient aware of next appointment, provided AVS

## 2023-07-05 DIAGNOSIS — E53.8 B12 DEFICIENCY: Primary | ICD-10-CM

## 2023-07-05 RX ORDER — CYANOCOBALAMIN 1000 UG/ML
1000 INJECTION, SOLUTION INTRAMUSCULAR; SUBCUTANEOUS ONCE
Status: CANCELLED | OUTPATIENT
Start: 2023-07-07

## 2023-07-06 DIAGNOSIS — D50.8 IRON DEFICIENCY ANEMIA SECONDARY TO INADEQUATE DIETARY IRON INTAKE: Primary | ICD-10-CM

## 2023-07-06 RX ORDER — SODIUM CHLORIDE 9 MG/ML
20 INJECTION, SOLUTION INTRAVENOUS ONCE
Status: CANCELLED | OUTPATIENT
Start: 2023-07-07

## 2023-07-07 ENCOUNTER — HOSPITAL ENCOUNTER (OUTPATIENT)
Dept: INFUSION CENTER | Facility: CLINIC | Age: 78
End: 2023-07-07
Payer: COMMERCIAL

## 2023-07-07 VITALS
SYSTOLIC BLOOD PRESSURE: 121 MMHG | TEMPERATURE: 97.1 F | DIASTOLIC BLOOD PRESSURE: 79 MMHG | HEART RATE: 83 BPM | RESPIRATION RATE: 18 BRPM | OXYGEN SATURATION: 97 %

## 2023-07-07 DIAGNOSIS — E53.8 B12 DEFICIENCY: ICD-10-CM

## 2023-07-07 DIAGNOSIS — D50.8 IRON DEFICIENCY ANEMIA SECONDARY TO INADEQUATE DIETARY IRON INTAKE: Primary | ICD-10-CM

## 2023-07-07 PROCEDURE — 96374 THER/PROPH/DIAG INJ IV PUSH: CPT

## 2023-07-07 PROCEDURE — 96372 THER/PROPH/DIAG INJ SC/IM: CPT

## 2023-07-07 RX ORDER — SODIUM CHLORIDE 9 MG/ML
20 INJECTION, SOLUTION INTRAVENOUS ONCE
Status: CANCELLED | OUTPATIENT
Start: 2023-07-14

## 2023-07-07 RX ORDER — CYANOCOBALAMIN 1000 UG/ML
1000 INJECTION, SOLUTION INTRAMUSCULAR; SUBCUTANEOUS ONCE
Status: CANCELLED | OUTPATIENT
Start: 2023-07-14

## 2023-07-07 RX ORDER — CYANOCOBALAMIN 1000 UG/ML
1000 INJECTION, SOLUTION INTRAMUSCULAR; SUBCUTANEOUS ONCE
Status: COMPLETED | OUTPATIENT
Start: 2023-07-07 | End: 2023-07-07

## 2023-07-07 RX ORDER — SODIUM CHLORIDE 9 MG/ML
20 INJECTION, SOLUTION INTRAVENOUS ONCE
Status: COMPLETED | OUTPATIENT
Start: 2023-07-07 | End: 2023-07-07

## 2023-07-07 RX ADMIN — SODIUM CHLORIDE 20 ML/HR: 0.9 INJECTION, SOLUTION INTRAVENOUS at 11:40

## 2023-07-07 RX ADMIN — IRON SUCROSE 200 MG: 20 INJECTION, SOLUTION INTRAVENOUS at 11:40

## 2023-07-07 RX ADMIN — CYANOCOBALAMIN 1000 MCG: 1000 INJECTION, SOLUTION INTRAMUSCULAR; SUBCUTANEOUS at 11:41

## 2023-07-07 NOTE — PROGRESS NOTES
Patient arrives for Venofer IVP and B12 injection. VSS, PIV inserted by BARBARA RN without issue. B12 injection tolerated to LEFT deltoid without issue, bandaid in place. Patient resting on recliner chair - call bell within reach.

## 2023-07-07 NOTE — PROGRESS NOTES
Patient tolerated IVP Venofer without issue. PIV removed intact, coban wrap in place. Patient and family member aware of next appt, declines AVS. Patient ambulatory upon DC.

## 2023-07-14 ENCOUNTER — HOSPITAL ENCOUNTER (OUTPATIENT)
Dept: INFUSION CENTER | Facility: CLINIC | Age: 78
End: 2023-07-14
Payer: COMMERCIAL

## 2023-07-14 VITALS
RESPIRATION RATE: 18 BRPM | DIASTOLIC BLOOD PRESSURE: 82 MMHG | HEART RATE: 77 BPM | TEMPERATURE: 97 F | SYSTOLIC BLOOD PRESSURE: 132 MMHG | OXYGEN SATURATION: 97 %

## 2023-07-14 DIAGNOSIS — D50.8 IRON DEFICIENCY ANEMIA SECONDARY TO INADEQUATE DIETARY IRON INTAKE: ICD-10-CM

## 2023-07-14 DIAGNOSIS — E53.8 B12 DEFICIENCY: Primary | ICD-10-CM

## 2023-07-14 PROCEDURE — 96374 THER/PROPH/DIAG INJ IV PUSH: CPT

## 2023-07-14 PROCEDURE — 96372 THER/PROPH/DIAG INJ SC/IM: CPT

## 2023-07-14 RX ORDER — CYANOCOBALAMIN 1000 UG/ML
1000 INJECTION, SOLUTION INTRAMUSCULAR; SUBCUTANEOUS ONCE
Status: COMPLETED | OUTPATIENT
Start: 2023-07-14 | End: 2023-07-14

## 2023-07-14 RX ORDER — CYANOCOBALAMIN 1000 UG/ML
1000 INJECTION, SOLUTION INTRAMUSCULAR; SUBCUTANEOUS ONCE
Status: CANCELLED | OUTPATIENT
Start: 2023-07-20

## 2023-07-14 RX ORDER — SODIUM CHLORIDE 9 MG/ML
20 INJECTION, SOLUTION INTRAVENOUS ONCE
Status: CANCELLED | OUTPATIENT
Start: 2023-07-20

## 2023-07-14 RX ORDER — SODIUM CHLORIDE 9 MG/ML
20 INJECTION, SOLUTION INTRAVENOUS ONCE
Status: COMPLETED | OUTPATIENT
Start: 2023-07-14 | End: 2023-07-14

## 2023-07-14 RX ADMIN — CYANOCOBALAMIN 1000 MCG: 1000 INJECTION, SOLUTION INTRAMUSCULAR; SUBCUTANEOUS at 15:31

## 2023-07-14 RX ADMIN — SODIUM CHLORIDE 20 ML/HR: 0.9 INJECTION, SOLUTION INTRAVENOUS at 15:30

## 2023-07-14 RX ADMIN — IRON SUCROSE 200 MG: 20 INJECTION, SOLUTION INTRAVENOUS at 15:36

## 2023-07-17 ENCOUNTER — OFFICE VISIT (OUTPATIENT)
Dept: FAMILY MEDICINE CLINIC | Facility: CLINIC | Age: 78
End: 2023-07-17
Payer: COMMERCIAL

## 2023-07-17 VITALS
DIASTOLIC BLOOD PRESSURE: 80 MMHG | HEART RATE: 89 BPM | OXYGEN SATURATION: 94 % | SYSTOLIC BLOOD PRESSURE: 142 MMHG | TEMPERATURE: 97.8 F

## 2023-07-17 DIAGNOSIS — F03.90 DEMENTIA WITHOUT BEHAVIORAL DISTURBANCE (HCC): ICD-10-CM

## 2023-07-17 DIAGNOSIS — D50.8 IRON DEFICIENCY ANEMIA SECONDARY TO INADEQUATE DIETARY IRON INTAKE: ICD-10-CM

## 2023-07-17 DIAGNOSIS — R21 RASH: Primary | ICD-10-CM

## 2023-07-17 PROCEDURE — 99213 OFFICE O/P EST LOW 20 MIN: CPT | Performed by: INTERNAL MEDICINE

## 2023-07-17 RX ORDER — METHYLPREDNISOLONE 4 MG/1
TABLET ORAL
Qty: 21 EACH | Refills: 0 | Status: SHIPPED | OUTPATIENT
Start: 2023-07-17

## 2023-07-17 NOTE — ASSESSMENT & PLAN NOTE
She is currently receiving iron infusions for anemia she did have an EGD but has refused a colonoscopy.

## 2023-07-17 NOTE — ASSESSMENT & PLAN NOTE
Patient has a flat red rash on her arms and legs that does not itch. Denies any new meds foods or products.   We will try Medrol Dosepak and also recommended some steroids or

## 2023-07-17 NOTE — PROGRESS NOTES
Name: Quitman Aschoff      : 1945      MRN: [id number]  Encounter Provider: Luisa Olson MD  Encounter Date: 2023   Encounter department: [facility]     1. Rash  Assessment & Plan:  Patient has a flat red rash on her arms and legs that does not itch. Denies any new meds foods or products. We will try Medrol Dosepak and also recommended some steroids or    Orders:  -     methylPREDNISolone 4 MG tablet therapy pack; Use as directed on package    2. Dementia without behavioral disturbance [facility])  Assessment & Plan:  She also has mild dementia      3. Iron deficiency anemia secondary to inadequate dietary iron intake  Assessment & Plan:  She is currently receiving iron infusions for anemia she did have an EGD but has refused a colonoscopy. Subjective      Rash  This is a new problem. The current episode started 1 to 4 weeks ago. The problem has been gradually worsening since onset. The affected locations include the right upper leg, right lower leg, left lower leg, left upper leg, left arm and right arm. The problem is moderate. The rash is characterized by redness. She was exposed to nothing. The rash first occurred at home. Pertinent negatives include no anorexia, congestion, fever, joint pain, shortness of breath or sore throat. Past treatments include nothing. There is no history of allergies, asthma, eczema or varicella. There were no sick contacts. Review of Systems   Constitutional: Negative for fever. HENT: Negative for congestion and sore throat. Respiratory: Negative for shortness of breath. Cardiovascular: Negative for palpitations and leg swelling. Gastrointestinal: Negative for abdominal distention, abdominal pain, anal bleeding, anorexia and constipation. Endocrine: Negative for polyuria. Genitourinary: Negative. Musculoskeletal: Negative. Negative for joint pain. Skin: Positive for rash.    Allergic/Immunologic: Negative for environmental allergies, food allergies and immunocompromised state. Neurological: Negative. Hematological: Negative. Psychiatric/Behavioral: Positive for confusion. Current Outpatient Medications on File Prior to Visit   Medication Sig   • Apoaequorin (Prevagen) 10 MG CAPS Take by mouth   • Cholecalciferol (VITAMIN D3) 1000 units CAPS Take 2,000 Units by mouth    • ferrous sulfate 325 (65 Fe) mg tablet Take 325 mg by mouth daily with breakfast   • levothyroxine 50 mcg tablet TAKE 1 TABLET (50 MCG TOTAL) BY MOUTH DAILY IN THE EARLY MORNING   • omeprazole (PriLOSEC) 40 MG capsule Take 1 capsule (40 mg total) by mouth daily   • simvastatin (ZOCOR) 10 mg tablet TAKE 1 TABLET BY MOUTH EVERY DAY   • verapamil (CALAN-SR) 180 mg CR tablet TAKE 1 TABLET BY MOUTH EVERY DAY       Objective     /80 (BP Location: Right arm, Patient Position: Sitting, Cuff Size: Large)   Pulse 89   Temp 97.8 °F (36.6 °C) (Temporal)   SpO2 94%     Physical Exam  Constitutional:       General: She is not in acute distress. Appearance: Normal appearance. She is well-developed. She is obese. She is not ill-appearing. HENT:      Head: Normocephalic and atraumatic. Right Ear: External ear normal.      Left Ear: External ear normal.      Nose: Nose normal.      Mouth/Throat:      Pharynx: No oropharyngeal exudate. Eyes:      Pupils: Pupils are equal, round, and reactive to light. Neck:      Thyroid: No thyromegaly. Vascular: No JVD. Cardiovascular:      Rate and Rhythm: Normal rate and regular rhythm. Heart sounds: Normal heart sounds. No murmur heard. No gallop. Pulmonary:      Effort: Pulmonary effort is normal. No respiratory distress. Breath sounds: Normal breath sounds. No wheezing or rales. Abdominal:      General: Bowel sounds are normal. There is no distension. Palpations: Abdomen is soft. There is no mass. Tenderness: There is no abdominal tenderness. Musculoskeletal:         General: No tenderness. Normal range of motion. Cervical back: Normal range of motion and neck supple. Right lower leg: No edema. Left lower leg: No edema. Lymphadenopathy:      Cervical: No cervical adenopathy. Skin:     Findings: Erythema and rash present. Neurological:      General: No focal deficit present. Mental Status: She is alert and oriented to person, place, and time. Mental status is at baseline. Cranial Nerves: No cranial nerve deficit.       Coordination: Coordination normal.   Psychiatric:         Mood and Affect: Mood normal.         Behavior: Behavior normal.         Judgment: Judgment normal.       Bella Curran MD

## 2023-07-20 ENCOUNTER — HOSPITAL ENCOUNTER (OUTPATIENT)
Dept: INFUSION CENTER | Facility: CLINIC | Age: 78
Discharge: HOME | End: 2023-07-20
Payer: COMMERCIAL

## 2023-07-20 VITALS
RESPIRATION RATE: 18 BRPM | TEMPERATURE: 97.5 F | OXYGEN SATURATION: 94 % | SYSTOLIC BLOOD PRESSURE: 128 MMHG | DIASTOLIC BLOOD PRESSURE: 72 MMHG | HEART RATE: 93 BPM

## 2023-07-20 DIAGNOSIS — E53.8 B12 DEFICIENCY: Primary | ICD-10-CM

## 2023-07-20 DIAGNOSIS — D50.8 IRON DEFICIENCY ANEMIA SECONDARY TO INADEQUATE DIETARY IRON INTAKE: ICD-10-CM

## 2023-07-20 PROCEDURE — 96374 THER/PROPH/DIAG INJ IV PUSH: CPT

## 2023-07-20 PROCEDURE — 96372 THER/PROPH/DIAG INJ SC/IM: CPT

## 2023-07-20 RX ORDER — CYANOCOBALAMIN 1000 UG/ML
1000 INJECTION, SOLUTION INTRAMUSCULAR; SUBCUTANEOUS ONCE
Status: CANCELLED | OUTPATIENT
Start: 2023-07-21

## 2023-07-20 RX ORDER — SODIUM CHLORIDE 9 MG/ML
20 INJECTION, SOLUTION INTRAVENOUS ONCE
Status: COMPLETED | OUTPATIENT
Start: 2023-07-20 | End: 2023-07-20

## 2023-07-20 RX ORDER — SODIUM CHLORIDE 9 MG/ML
20 INJECTION, SOLUTION INTRAVENOUS ONCE
Status: CANCELLED | OUTPATIENT
Start: 2023-07-21

## 2023-07-20 RX ORDER — CYANOCOBALAMIN 1000 UG/ML
1000 INJECTION, SOLUTION INTRAMUSCULAR; SUBCUTANEOUS ONCE
Status: COMPLETED | OUTPATIENT
Start: 2023-07-20 | End: 2023-07-20

## 2023-07-20 RX ADMIN — IRON SUCROSE 200 MG: 20 INJECTION, SOLUTION INTRAVENOUS at 13:42

## 2023-07-20 RX ADMIN — SODIUM CHLORIDE 20 ML/HR: 0.9 INJECTION, SOLUTION INTRAVENOUS at 13:43

## 2023-07-20 RX ADMIN — CYANOCOBALAMIN 1000 MCG: 1000 INJECTION, SOLUTION INTRAMUSCULAR; SUBCUTANEOUS at 13:42

## 2023-07-20 NOTE — PROGRESS NOTES
Pt has no complaints. IV started and IVP iron given without issue. Pt declined AVS; aware of next appt.

## 2023-07-24 ENCOUNTER — HOSPITAL ENCOUNTER (OUTPATIENT)
Dept: INFUSION CENTER | Facility: CLINIC | Age: 78
End: 2023-07-24

## 2023-07-26 ENCOUNTER — HOSPITAL ENCOUNTER (OUTPATIENT)
Dept: INFUSION CENTER | Facility: CLINIC | Age: 78
Discharge: HOME | End: 2023-07-26
Payer: COMMERCIAL

## 2023-07-26 VITALS
SYSTOLIC BLOOD PRESSURE: 143 MMHG | TEMPERATURE: 96.9 F | HEART RATE: 84 BPM | OXYGEN SATURATION: 95 % | DIASTOLIC BLOOD PRESSURE: 83 MMHG | RESPIRATION RATE: 18 BRPM

## 2023-07-26 DIAGNOSIS — D50.8 IRON DEFICIENCY ANEMIA SECONDARY TO INADEQUATE DIETARY IRON INTAKE: Primary | ICD-10-CM

## 2023-07-26 PROCEDURE — 96374 THER/PROPH/DIAG INJ IV PUSH: CPT

## 2023-07-26 RX ORDER — SODIUM CHLORIDE 9 MG/ML
20 INJECTION, SOLUTION INTRAVENOUS ONCE
Status: CANCELLED | OUTPATIENT
Start: 2023-07-27

## 2023-07-26 RX ORDER — SODIUM CHLORIDE 9 MG/ML
20 INJECTION, SOLUTION INTRAVENOUS ONCE
Status: COMPLETED | OUTPATIENT
Start: 2023-07-26 | End: 2023-07-26

## 2023-07-26 RX ADMIN — SODIUM CHLORIDE 20 ML/HR: 0.9 INJECTION, SOLUTION INTRAVENOUS at 14:45

## 2023-07-26 RX ADMIN — IRON SUCROSE 200 MG: 20 INJECTION, SOLUTION INTRAVENOUS at 14:44

## 2023-07-26 NOTE — PROGRESS NOTES
Patient presents today for IVP venofer and vitamin b12 injection. Patient offers no complaints. VSS. Peripheral IV inserted without incident.

## 2023-07-26 NOTE — PROGRESS NOTES
Patient to infusion for IVP venofer. She offers no complaints. She tolerated treatment today.   Next appointment confirmed, she declined AVS

## 2023-07-27 ENCOUNTER — APPOINTMENT (OUTPATIENT)
Dept: LAB | Facility: MEDICAL CENTER | Age: 78
End: 2023-07-27
Payer: COMMERCIAL

## 2023-07-27 DIAGNOSIS — D50.9 MICROCYTIC ANEMIA: ICD-10-CM

## 2023-07-27 DIAGNOSIS — D50.0 IRON DEFICIENCY ANEMIA DUE TO CHRONIC BLOOD LOSS: ICD-10-CM

## 2023-07-27 LAB
ALBUMIN SERPL BCP-MCNC: 3.5 G/DL (ref 3.5–5)
ALP SERPL-CCNC: 84 U/L (ref 46–116)
ALT SERPL W P-5'-P-CCNC: 21 U/L (ref 12–78)
ANION GAP SERPL CALCULATED.3IONS-SCNC: 9 MMOL/L
AST SERPL W P-5'-P-CCNC: 15 U/L (ref 5–45)
BASOPHILS # BLD AUTO: 0.03 THOUSANDS/ÂΜL (ref 0–0.1)
BASOPHILS NFR BLD AUTO: 0 % (ref 0–1)
BILIRUB SERPL-MCNC: 0.68 MG/DL (ref 0.2–1)
BUN SERPL-MCNC: 19 MG/DL (ref 5–25)
CALCIUM SERPL-MCNC: 9 MG/DL (ref 8.3–10.1)
CHLORIDE SERPL-SCNC: 107 MMOL/L (ref 96–108)
CO2 SERPL-SCNC: 23 MMOL/L (ref 21–32)
CREAT SERPL-MCNC: 1.11 MG/DL (ref 0.6–1.3)
EOSINOPHIL # BLD AUTO: 0.21 THOUSAND/ÂΜL (ref 0–0.61)
EOSINOPHIL NFR BLD AUTO: 3 % (ref 0–6)
ERYTHROCYTE [DISTWIDTH] IN BLOOD BY AUTOMATED COUNT: 16.6 % (ref 11.6–15.1)
FERRITIN SERPL-MCNC: 436 NG/ML (ref 11–307)
FOLATE SERPL-MCNC: 11.6 NG/ML
GFR SERPL CREATININE-BSD FRML MDRD: 47 ML/MIN/1.73SQ M
GLUCOSE P FAST SERPL-MCNC: 95 MG/DL (ref 65–99)
HCT VFR BLD AUTO: 41 % (ref 34.8–46.1)
HGB BLD-MCNC: 13.4 G/DL (ref 11.5–15.4)
IMM GRANULOCYTES # BLD AUTO: 0.05 THOUSAND/UL (ref 0–0.2)
IMM GRANULOCYTES NFR BLD AUTO: 1 % (ref 0–2)
IRON SATN MFR SERPL: 81 % (ref 15–50)
IRON SERPL-MCNC: 185 UG/DL (ref 50–170)
LYMPHOCYTES # BLD AUTO: 1.27 THOUSANDS/ÂΜL (ref 0.6–4.47)
LYMPHOCYTES NFR BLD AUTO: 15 % (ref 14–44)
MCH RBC QN AUTO: 28.5 PG (ref 26.8–34.3)
MCHC RBC AUTO-ENTMCNC: 32.7 G/DL (ref 31.4–37.4)
MCV RBC AUTO: 87 FL (ref 82–98)
MONOCYTES # BLD AUTO: 0.88 THOUSAND/ÂΜL (ref 0.17–1.22)
MONOCYTES NFR BLD AUTO: 11 % (ref 4–12)
NEUTROPHILS # BLD AUTO: 5.83 THOUSANDS/ÂΜL (ref 1.85–7.62)
NEUTS SEG NFR BLD AUTO: 70 % (ref 43–75)
NRBC BLD AUTO-RTO: 0 /100 WBCS
PLATELET # BLD AUTO: 195 THOUSANDS/UL (ref 149–390)
PMV BLD AUTO: 10.1 FL (ref 8.9–12.7)
POTASSIUM SERPL-SCNC: 3.9 MMOL/L (ref 3.5–5.3)
PROT SERPL-MCNC: 7.2 G/DL (ref 6.4–8.4)
RBC # BLD AUTO: 4.7 MILLION/UL (ref 3.81–5.12)
SODIUM SERPL-SCNC: 139 MMOL/L (ref 135–147)
TIBC SERPL-MCNC: 227 UG/DL (ref 250–450)
VIT B12 SERPL-MCNC: 1339 PG/ML (ref 180–914)
WBC # BLD AUTO: 8.27 THOUSAND/UL (ref 4.31–10.16)

## 2023-07-27 PROCEDURE — 83550 IRON BINDING TEST: CPT

## 2023-07-27 PROCEDURE — 80053 COMPREHEN METABOLIC PANEL: CPT

## 2023-07-27 PROCEDURE — 36415 COLL VENOUS BLD VENIPUNCTURE: CPT

## 2023-07-27 PROCEDURE — 82746 ASSAY OF FOLIC ACID SERUM: CPT

## 2023-07-27 PROCEDURE — 85025 COMPLETE CBC W/AUTO DIFF WBC: CPT

## 2023-07-27 PROCEDURE — 83918 ORGANIC ACIDS TOTAL QUANT: CPT

## 2023-07-27 PROCEDURE — 82607 VITAMIN B-12: CPT

## 2023-07-27 PROCEDURE — 83540 ASSAY OF IRON: CPT

## 2023-07-27 PROCEDURE — 82728 ASSAY OF FERRITIN: CPT

## 2023-07-31 LAB — METHYLMALONATE SERPL-SCNC: 368 NMOL/L (ref 0–378)

## 2023-08-01 ENCOUNTER — TELEPHONE (OUTPATIENT)
Dept: GASTROENTEROLOGY | Facility: AMBULARY SURGERY CENTER | Age: 78
End: 2023-08-01

## 2023-08-01 ENCOUNTER — OFFICE VISIT (OUTPATIENT)
Dept: HEMATOLOGY ONCOLOGY | Facility: CLINIC | Age: 78
End: 2023-08-01
Payer: COMMERCIAL

## 2023-08-01 VITALS
OXYGEN SATURATION: 95 % | DIASTOLIC BLOOD PRESSURE: 72 MMHG | TEMPERATURE: 97.3 F | WEIGHT: 200 LBS | HEIGHT: 62 IN | SYSTOLIC BLOOD PRESSURE: 128 MMHG | HEART RATE: 100 BPM | BODY MASS INDEX: 36.8 KG/M2 | RESPIRATION RATE: 17 BRPM

## 2023-08-01 DIAGNOSIS — D50.0 IRON DEFICIENCY ANEMIA DUE TO CHRONIC BLOOD LOSS: ICD-10-CM

## 2023-08-01 DIAGNOSIS — D50.9 MICROCYTIC ANEMIA: Primary | ICD-10-CM

## 2023-08-01 DIAGNOSIS — D50.8 IRON DEFICIENCY ANEMIA SECONDARY TO INADEQUATE DIETARY IRON INTAKE: ICD-10-CM

## 2023-08-01 DIAGNOSIS — E53.8 B12 DEFICIENCY: ICD-10-CM

## 2023-08-01 PROCEDURE — 99214 OFFICE O/P EST MOD 30 MIN: CPT

## 2023-08-01 NOTE — PROGRESS NOTES
[facility] [id number]  Hematology Ambulatory [address] [place] [address], 1945, [id number]  8/1/2023    Assessment/Plan:  1. Microcytic anemia  2. Iron deficiency anemia secondary to inadequate dietary iron intake  3. Iron deficiency anemia due to chronic blood loss  4. B12 deficiency  Ms. Iliana Crocker is an 17-year-old female seen in follow-up for iron deficiency anemia. Her iron deficiency is likely due to hiatal hernia and Jay lesions. She received 5 doses of IV Venofer 200 mg which she tolerated well. She did develop a rash on her legs but is unsure if this started prior to or after starting the iron infusions treated with a Medrol Dosepak and resolved. She has not noted any improvement in any symptoms and overall still feels the same with no complaints or concerns. We discussed that her hemoglobin is now within normal limits at 13.4. Iron panel is not likely adequate as it was drawn the day after her last infusion. B12 is now adequate with a normal MMA. We discussed continuing monthly B12 injections for at least 6 months to 1 year. She will schedule more of these at her next infusion appointment. I will continue to monitor her labs closely with repeat labs in 3 months prior to follow-up with me in the office.    - CBC and differential; Future  - Iron Panel (Includes Ferritin, Iron Sat%, Iron, and TIBC); Future  - Folate; Future  - Vitamin B12; Future  - Methylmalonic acid, serum; Future      The patient is scheduled for follow-up in approximately 3 months. Patient voiced agreement and understanding to the above. Patient knows to call the Hematology/Oncology office with any questions and concerns regarding the above.       Barrier(s) to care: None  The patient is able to self care.  ------------------------------------------------------------------------------------------------------    Chief Complaint Patient presents with   • Follow-up       History of present illness:   Eben Mcguire is an 49-year-old female seen in follow-up for iron deficiency anemia. She has past medical history of hypertension, hypothyroidism, hyperlipidemia, and dementia. She was originally seen in consultation in March 2023 with worsening anemia. She was found to have a ferritin of 10, iron saturation 6%, TIBC 393, serum iron 22 with a hemoglobin of 9.2 and MCV of 77. She had been tolerating oral iron for 1 month without significant improvement in her iron panel therefore proceeded with IV iron. She has had EGD in May which demonstrated a type II hiatal hernia with Jodine Shines lesions and some patchy erythematous mucosa with erosion. She has deferred colonoscopy work-up at this time. 2/10/2023: Hemoglobin 9.2, MCV 77, platelets 684, WBC 2.38              Ferritin 10, iron saturation 6%, TIBC 393, serum iron 22  11/9/2022: Hemoglobin 11.1, MCV 85, platelets 897, WBC 1.05  4/11/2022: Hemoglobin 13.6, MCV 88, platelets 628, WBC 4.72  3/29/2023: Hemoglobin 9.5, MCV 82, platelets 073, WBC 8.93   Ferritin 17, iron saturation 51%, TIBC 373, serum iron 191   B12 564, MMA elevated 730  IV Venofer 200 mg x5: 6/27-7/26  Weekly B12 injections x4  7/27/2023: Hemoglobin 13.4, MCV 87, platelets 478, WBC 2.62   Ferritin 436, iron saturation 81%, TIBC 227, serum iron 185   B12 1339, MMA within normal limits    Interval history:   She has not noted much improvement in any of her symptoms since receiving the IV iron. She did tolerate the IV iron well but did develop a rash on her legs treated with a Medrol Dosepak by her PCP. Patient's  states he is unsure if the rash started prior to or after starting the infusions. Review of Systems   Constitutional: Negative for activity change, appetite change, diaphoresis, fatigue, fever and unexpected weight change. HENT: Negative for trouble swallowing and voice change.     Eyes: Negative for photophobia and visual disturbance. Respiratory: Negative for cough, chest tightness and shortness of breath. Cardiovascular: Negative for chest pain, palpitations and leg swelling. Gastrointestinal: Negative for abdominal distention, abdominal pain, anal bleeding, blood in stool, constipation, diarrhea, nausea and vomiting. Endocrine: Negative for cold intolerance and heat intolerance. Genitourinary: Negative for dysuria, hematuria and urgency. Musculoskeletal: Negative for arthralgias, back pain, gait problem, joint swelling and myalgias. Skin: Negative for pallor and rash. Neurological: Negative for dizziness, weakness, light-headedness, numbness and headaches. Hematological: Negative for adenopathy. Does not bruise/bleed easily. Psychiatric/Behavioral: Negative for confusion and sleep disturbance.        Patient Active Problem List   Diagnosis   • Benign essential HTN   • Hyperlipidemia   • Hypothyroidism   • Obesity   • Vitamin D deficiency   • Medicare annual wellness visit, subsequent   • Screening for breast cancer   • Stage 3a chronic kidney disease ([address] [facility])   • Iron deficiency anemia secondary to inadequate dietary iron intake   • B12 deficiency   • Dementia without behavioral disturbance (HCC)   • Rash       Past Medical History:   Diagnosis Date   • Disease of thyroid gland    • Hyperlipidemia    • Hypertension        Past Surgical History:   Procedure Laterality Date   • BREAST BIOPSY Left    • BREAST CYST EXCISION Right 1983   • COLONOSCOPY     • TOTAL HIP ARTHROPLASTY Bilateral    • TOTAL KNEE ARTHROPLASTY Right        Family History   Problem Relation Age of Onset   • No Known Problems Mother    • No Known Problems Father    • No Known Problems Maternal Grandmother    • No Known Problems Maternal Grandfather    • No Known Problems Paternal Grandmother    • No Known Problems Paternal Grandfather    • No Known Problems Maternal Aunt    • No Known Problems Son        Social History Socioeconomic History   • Marital status: /Civil Union     Spouse name: Not on file   • Number of children: Not on file   • Years of education: Not on file   • Highest education level: Not on file   Occupational History   • Not on file   Tobacco Use   • Smoking status: Never   • Smokeless tobacco: Never   Vaping Use   • Vaping Use: Never used   Substance and Sexual Activity   • Alcohol use: No   • Drug use: No   • Sexual activity: Not on file   Other Topics Concern   • Not on file   Social History Narrative   • Not on file     Social Determinants of Health     Financial Resource Strain: Not on file   Food Insecurity: Not on file   Transportation Needs: Not on file   Physical Activity: Not on file   Stress: Not on file   Social Connections: Not on file   Intimate Partner Violence: Not on file   Housing Stability: Not on file         Current Outpatient Medications:   •  Apoaequorin (Prevagen) 10 MG CAPS, Take by mouth, Disp: , Rfl:   •  Cholecalciferol (VITAMIN D3) 1000 units CAPS, Take 2,000 Units by mouth , Disp: , Rfl:   •  ferrous sulfate 325 (65 Fe) mg tablet, Take 325 mg by mouth daily with breakfast, Disp: , Rfl:   •  levothyroxine 50 mcg tablet, TAKE 1 TABLET (50 MCG TOTAL) BY MOUTH DAILY IN THE EARLY MORNING, Disp: 30 tablet, Rfl: 1  •  methylPREDNISolone 4 MG tablet therapy pack, Use as directed on package, Disp: 21 each, Rfl: 0  •  omeprazole (PriLOSEC) 40 MG capsule, Take 1 capsule (40 mg total) by mouth daily, Disp: 90 capsule, Rfl: 3  •  simvastatin (ZOCOR) 10 mg tablet, TAKE 1 TABLET BY MOUTH EVERY DAY, Disp: 90 tablet, Rfl: 1  •  verapamil (CALAN-SR) 180 mg CR tablet, TAKE 1 TABLET BY MOUTH EVERY DAY, Disp: 90 tablet, Rfl: 1    Allergies   Allergen Reactions   • Epinephrine    • Fluconazole    • Naproxen    • Shellfish-Derived Products - Food Allergy    • Sulfa Antibiotics        Objective:  /72 (BP Location: Left arm, Patient Position: Sitting, Cuff Size: Adult)   Pulse 100   Temp Augusta Griffin ) 97.3 °F (36.3 °C) (Temporal)   Resp 17   Ht 5' 2" (1.575 m)   Wt 90.7 kg (200 lb)   SpO2 95%   BMI 36.58 kg/m²    Physical Exam  Constitutional:       General: She is not in acute distress. Appearance: Normal appearance. She is normal weight. She is not ill-appearing. HENT:      Head: Normocephalic and atraumatic. Eyes:      Extraocular Movements: Extraocular movements intact. Conjunctiva/sclera: Conjunctivae normal.      Pupils: Pupils are equal, round, and reactive to light. Cardiovascular:      Rate and Rhythm: Normal rate and regular rhythm. Pulses: Normal pulses. Heart sounds: Normal heart sounds. No murmur heard. Pulmonary:      Effort: Pulmonary effort is normal. No respiratory distress. Breath sounds: Normal breath sounds. Abdominal:      General: Abdomen is flat. Bowel sounds are normal. There is no distension. Palpations: Abdomen is soft. Tenderness: There is no abdominal tenderness. Musculoskeletal:         General: Normal range of motion. Cervical back: Normal range of motion. No tenderness. Right lower leg: No edema. Left lower leg: No edema. Lymphadenopathy:      Cervical: No cervical adenopathy. Skin:     General: Skin is warm and dry. Capillary Refill: Capillary refill takes less than 2 seconds. Coloration: Skin is not jaundiced or pale. Neurological:      General: No focal deficit present. Mental Status: She is alert and oriented to person, place, and time. Mental status is at baseline. Cranial Nerves: No cranial nerve deficit. Motor: No weakness. Gait: Gait normal.   Psychiatric:         Mood and Affect: Mood normal.         Behavior: Behavior normal.         Thought Content:  Thought content normal.         Judgment: Judgment normal.         Result Review  Labs:  Appointment on 07/27/2023   Component Date Value Ref Range Status   • Sodium 07/27/2023 139  135 - 147 mmol/L Final   • Potassium 07/27/2023 3.9  3.5 - 5.3 mmol/L Final   • Chloride 07/27/2023 107  96 - 108 mmol/L Final   • CO2 07/27/2023 23  21 - 32 mmol/L Final   • ANION GAP 07/27/2023 9  mmol/L Final   • BUN 07/27/2023 19  5 - 25 mg/dL Final   • Creatinine 07/27/2023 1.11  0.60 - 1.30 mg/dL Final    Standardized to IDMS reference method   • Glucose, Fasting 07/27/2023 95  65 - 99 mg/dL Final    Specimen collection should occur prior to Sulfasalazine administration due to the potential for falsely depressed results. Specimen collection should occur prior to Sulfapyridine administration due to the potential for falsely elevated results. • Calcium 07/27/2023 9.0  8.3 - 10.1 mg/dL Final   • AST 07/27/2023 15  5 - 45 U/L Final    Specimen collection should occur prior to Sulfasalazine administration due to the potential for falsely depressed results. • ALT 07/27/2023 21  12 - 78 U/L Final    Specimen collection should occur prior to Sulfasalazine and/or Sulfapyridine administration due to the potential for falsely depressed results. • Alkaline Phosphatase 07/27/2023 84  46 - 116 U/L Final   • Total Protein 07/27/2023 7.2  6.4 - 8.4 g/dL Final   • Albumin 07/27/2023 3.5  3.5 - 5.0 g/dL Final   • Total Bilirubin 07/27/2023 0.68  0.20 - 1.00 mg/dL Final    Use of this assay is not recommended for patients undergoing treatment with eltrombopag due to the potential for falsely elevated results.    • eGFR 07/27/2023 47  ml/min/1.73sq m Final   • WBC 07/27/2023 8.27  4.31 - 10.16 Thousand/uL Final   • RBC 07/27/2023 4.70  3.81 - 5.12 Million/uL Final   • Hemoglobin 07/27/2023 13.4  11.5 - 15.4 g/dL Final   • Hematocrit 07/27/2023 41.0  34.8 - 46.1 % Final   • MCV 07/27/2023 87  82 - 98 fL Final   • MCH 07/27/2023 28.5  26.8 - 34.3 pg Final   • MCHC 07/27/2023 32.7  31.4 - 37.4 g/dL Final   • RDW 07/27/2023 16.6 (H)  11.6 - 15.1 % Final   • MPV 07/27/2023 10.1  8.9 - 12.7 fL Final   • Platelets 77/88/1972 195  149 - 390 Thousands/uL Final   • nRBC 07/27/2023 0  /100 WBCs Final   • Neutrophils Relative 07/27/2023 70  43 - 75 % Final   • Immat GRANS % 07/27/2023 1  0 - 2 % Final   • Lymphocytes Relative 07/27/2023 15  14 - 44 % Final   • Monocytes Relative 07/27/2023 11  4 - 12 % Final   • Eosinophils Relative 07/27/2023 3  0 - 6 % Final   • Basophils Relative 07/27/2023 0  0 - 1 % Final   • Neutrophils Absolute 07/27/2023 5.83  1.85 - 7.62 Thousands/µL Final   • Immature Grans Absolute 07/27/2023 0.05  0.00 - 0.20 Thousand/uL Final   • Lymphocytes Absolute 07/27/2023 1.27  0.60 - 4.47 Thousands/µL Final   • Monocytes Absolute 07/27/2023 0.88  0.17 - 1.22 Thousand/µL Final   • Eosinophils Absolute 07/27/2023 0.21  0.00 - 0.61 Thousand/µL Final   • Basophils Absolute 07/27/2023 0.03  0.00 - 0.10 Thousands/µL Final   • Vitamin B-12 07/27/2023 1,339 (H)  180 - 914 pg/mL Final   • Methylmalonic Acid, S 07/27/2023 368  0 - 378 nmol/L Final   • Folate 07/27/2023 11.6  >5.9 ng/mL Final    The World Health Organization has determined deficient folate concentrations are considered to be <4.0 ng/mL. • Iron Saturation 07/27/2023 81 (H)  15 - 50 % Final   • TIBC 07/27/2023 227 (L)  250 - 450 ug/dL Final   • Iron 07/27/2023 185 (H)  50 - 170 ug/dL Final    Patients treated with metal-binding drugs (ie. Deferoxamine) may have depressed iron values. • Ferritin 07/27/2023 436 (H)  11 - 307 ng/mL Final       Imaging:   No relevant imaging to review     Please note: This report has been generated by a voice recognition software system. Therefore there may be syntax, spelling, and/or grammatical errors. Please call if you have any questions.

## 2023-08-01 NOTE — TELEPHONE ENCOUNTER
Patient came into see Onocology today and inquired about seeing Dr Divya Mccarty again. Please call patient to schedule a follow up appointment.  Thank you

## 2023-08-02 ENCOUNTER — OFFICE VISIT (OUTPATIENT)
Dept: GASTROENTEROLOGY | Facility: AMBULARY SURGERY CENTER | Age: 78
End: 2023-08-02
Payer: COMMERCIAL

## 2023-08-02 VITALS
RESPIRATION RATE: 18 BRPM | DIASTOLIC BLOOD PRESSURE: 74 MMHG | WEIGHT: 199 LBS | HEART RATE: 88 BPM | HEIGHT: 62 IN | SYSTOLIC BLOOD PRESSURE: 132 MMHG | BODY MASS INDEX: 36.62 KG/M2

## 2023-08-02 DIAGNOSIS — D50.0 IRON DEFICIENCY ANEMIA DUE TO CHRONIC BLOOD LOSS: Primary | ICD-10-CM

## 2023-08-02 DIAGNOSIS — K44.9 HIATAL HERNIA: ICD-10-CM

## 2023-08-02 DIAGNOSIS — K25.0 HEMORRHAGE DUE TO ACUTE CAMERON LESION: ICD-10-CM

## 2023-08-02 PROCEDURE — 99214 OFFICE O/P EST MOD 30 MIN: CPT | Performed by: INTERNAL MEDICINE

## 2023-08-02 NOTE — PROGRESS NOTES
[facility] - Outpatient Consultation  Nikki Montelongo 78 y.o. female MRN: [id number]  Encounter: [id number]      PCP: Tino Hammans, MD  Referring: Tino Hammans, MD  [address] [facility], [place],  [address] [place] [address]      ASSESSMENT AND PLAN:      1. Iron deficiency anemia due to chronic blood loss  2. Hiatal hernia  3. Hemorrhage due to acute Oskar Heart lesion  - evidence of large hiatal hernia with jay's erosions/ulcers on EGD  - discussed pathophysiology of mechanical stress from hiatal hernia resulting in chronic GI blood loss  - patient refuses surgical evaluation  - continue PPI  - recommend continued hematology follow up with iron transfusion/supplementation as needed  ______________________________________________________________________    CC:  Chief Complaint   Patient presents with   • Follow-up     EGD 5/15/2023       HPI:      Patient is an 47-year-old female who sees me in follow-up for acute GI blood loss anemia-EGD with evidence of large hiatal hernia, with Jay's erosions; also found to have vitamin B12 deficiency. He is present with her  at today's visit, who contributes to history. Is followed by hematology, and completed 5 doses of IV Venofer, as well as monthly B12 injections with therapeutic response. She has refused colonoscopy. Is otherwise feeling well. Denies any rectal bleeding. Abdominal Pain  Associated symptoms include melena. Pertinent negatives include no anorexia, arthralgias, belching, constipation, diarrhea, dysuria, fever, flatus, frequency, headaches, hematochezia, hematuria, myalgias, nausea, vomiting or weight loss. Prior diagnostic workup includes upper endoscopy. REVIEW OF SYSTEMS:    CONSTITUTIONAL: Denies any fever, chills, rigors, and weight loss. HEENT: No earache or tinnitus. Denies hearing loss or visual disturbances. CARDIOVASCULAR: No chest pain or palpitations.    RESPIRATORY: Denies any cough, hemoptysis, shortness of breath or dyspnea on exertion. GASTROINTESTINAL: As noted in the History of Present Illness. GENITOURINARY: No problems with urination. Denies any hematuria or dysuria. NEUROLOGIC: No dizziness or vertigo, denies headaches. MUSCULOSKELETAL: Denies any muscle or joint pain. SKIN: Denies skin rashes or itching. ENDOCRINE: Denies excessive thirst. Denies intolerance to heat or cold. PSYCHOSOCIAL: Denies depression or anxiety. Denies any recent memory loss.        Historical Information   Past Medical History:   Diagnosis Date   • Disease of thyroid gland    • Hyperlipidemia    • Hypertension      Past Surgical History:   Procedure Laterality Date   • BREAST BIOPSY Left    • BREAST CYST EXCISION Right 1983   • COLONOSCOPY     • TOTAL HIP ARTHROPLASTY Bilateral    • TOTAL KNEE ARTHROPLASTY Right    • UPPER GASTROINTESTINAL ENDOSCOPY       Social History   Social History     Substance and Sexual Activity   Alcohol Use No     Social History     Substance and Sexual Activity   Drug Use No     Social History     Tobacco Use   Smoking Status Never   Smokeless Tobacco Never     Family History   Problem Relation Age of Onset   • No Known Problems Mother    • No Known Problems Father    • No Known Problems Maternal Grandmother    • No Known Problems Maternal Grandfather    • No Known Problems Paternal Grandmother    • No Known Problems Paternal Grandfather    • No Known Problems Maternal Aunt    • No Known Problems Son        Meds/Allergies       Current Outpatient Medications:   •  Apoaequorin (Prevagen) 10 MG CAPS  •  Cholecalciferol (VITAMIN D3) 1000 units CAPS  •  levothyroxine 50 mcg tablet  •  methylPREDNISolone 4 MG tablet therapy pack  •  omeprazole (PriLOSEC) 40 MG capsule  •  simvastatin (ZOCOR) 10 mg tablet  •  verapamil (CALAN-SR) 180 mg CR tablet  •  ferrous sulfate 325 (65 Fe) mg tablet    Allergies   Allergen Reactions   • Epinephrine    • Fluconazole    • Naproxen    • Shellfish-Derived Products - Food Allergy    • Sulfa Antibiotics            Objective     Blood pressure 132/74, pulse 88, resp. rate 18, height 5' 2" (1.575 m), weight 90.3 kg (199 lb). Body mass index is 36.4 kg/m². PHYSICAL EXAM:      General Appearance:   Alert, cooperative, no distress   HEENT:   Normocephalic, atraumatic, anicteric. Neck:  Supple, symmetrical, trachea midline   Lungs:   Clear to auscultation bilaterally; no rales, rhonchi or wheezing; respirations unlabored    Heart[de-identified]   Regular rate and rhythm; no murmur, rub, or gallop. Abdomen:   Soft, non-tender, non-distended; normal bowel sounds; no masses, no organomegaly    Genitalia:   Deferred    Rectal:   Deferred    Extremities:  No cyanosis, clubbing or edema    Pulses:  2+ and symmetric    Skin:  No jaundice, rashes, or lesions    Lymph nodes:  No palpable cervical lymphadenopathy        Lab Results:     Lab Results   Component Value Date    WBC 8.27 07/27/2023    HGB 13.4 07/27/2023    HCT 41.0 07/27/2023    MCV 87 07/27/2023     07/27/2023       Lab Results   Component Value Date     01/12/2016    K 3.9 07/27/2023     07/27/2023    CO2 23 07/27/2023    BUN 19 07/27/2023    CREATININE 1.11 07/27/2023    GLUF 95 07/27/2023    CALCIUM 9.0 07/27/2023    AST 15 07/27/2023    ALT 21 07/27/2023    ALKPHOS 84 07/27/2023    PROT 7.3 01/12/2016    BILITOT 0.6 01/12/2016    EGFR 47 07/27/2023       No results found for: "INR", "PROTIME"      Radiology Results:   No results found. Portions of the record may have been created with voice recognition software. Occasional wrong word or "sound a like" substitutions may have occurred due to the inherent limitations of voice recognition software. Read the chart carefully and recognize, using context, where substitutions have occurred.

## 2023-08-14 ENCOUNTER — HOSPITAL ENCOUNTER (OUTPATIENT)
Dept: INFUSION CENTER | Facility: CLINIC | Age: 78
Discharge: HOME | End: 2023-08-14
Payer: COMMERCIAL

## 2023-08-14 DIAGNOSIS — E53.8 B12 DEFICIENCY: Primary | ICD-10-CM

## 2023-08-14 PROCEDURE — 96372 THER/PROPH/DIAG INJ SC/IM: CPT

## 2023-08-14 RX ORDER — CYANOCOBALAMIN 1000 UG/ML
1000 INJECTION, SOLUTION INTRAMUSCULAR; SUBCUTANEOUS ONCE
Status: COMPLETED | OUTPATIENT
Start: 2023-08-14 | End: 2023-08-14

## 2023-08-14 RX ORDER — CYANOCOBALAMIN 1000 UG/ML
1000 INJECTION, SOLUTION INTRAMUSCULAR; SUBCUTANEOUS ONCE
OUTPATIENT
Start: 2023-09-11

## 2023-08-14 RX ADMIN — CYANOCOBALAMIN 1000 MCG: 1000 INJECTION, SOLUTION INTRAMUSCULAR; SUBCUTANEOUS at 14:40

## 2023-08-14 NOTE — PROGRESS NOTES
Patient is here for B12. She offers no complaints at this time. B 12 given in left arm and she tolerated it well.  Next appointment confirmed and avs given

## 2023-09-08 DIAGNOSIS — E03.9 ACQUIRED HYPOTHYROIDISM: ICD-10-CM

## 2023-09-08 RX ORDER — LEVOTHYROXINE SODIUM 0.05 MG/1
50 TABLET ORAL
Qty: 30 TABLET | Refills: 1 | Status: SHIPPED | OUTPATIENT
Start: 2023-09-08

## 2023-09-11 ENCOUNTER — HOSPITAL ENCOUNTER (OUTPATIENT)
Dept: INFUSION CENTER | Facility: CLINIC | Age: 78
Discharge: HOME | End: 2023-09-11
Payer: COMMERCIAL

## 2023-09-11 DIAGNOSIS — E53.8 B12 DEFICIENCY: Primary | ICD-10-CM

## 2023-09-11 PROCEDURE — 96372 THER/PROPH/DIAG INJ SC/IM: CPT

## 2023-09-11 RX ORDER — CYANOCOBALAMIN 1000 UG/ML
1000 INJECTION, SOLUTION INTRAMUSCULAR; SUBCUTANEOUS ONCE
Status: COMPLETED | OUTPATIENT
Start: 2023-09-11 | End: 2023-09-11

## 2023-09-11 RX ORDER — CYANOCOBALAMIN 1000 UG/ML
1000 INJECTION, SOLUTION INTRAMUSCULAR; SUBCUTANEOUS ONCE
OUTPATIENT
Start: 2023-10-09

## 2023-09-11 RX ADMIN — CYANOCOBALAMIN 1000 MCG: 1000 INJECTION, SOLUTION INTRAMUSCULAR; SUBCUTANEOUS at 15:38

## 2023-09-11 NOTE — PROGRESS NOTES
Patient here for b12 injection and is well, no c/o or changes to report, patient tolerated injection to left deltoid, bandaid applied and patient discharged. She is aware to schedule her next injection for 1 month from now.

## 2023-09-11 NOTE — PATIENT INSTRUCTIONS
September 2023 Sunday Monday Tuesday Wednesday Thursday Friday Saturday                            1     2                3     4     5     6     7     8     9                10     11    INF THERAPY PLAN   3:30 PM   (30 min.)   AN INF QUICK CHAIR   [address] 12     13     14     15     16        Cycle 1, Treatment 2        17     18     19     20     21     22     23                24     25     26     27     28     29     30                       Treatment Details         9/11/2023 - Cycle 1, Treatment 2      Supportive Care: cyanocobalamin        October 2023 Sunday Monday Tuesday Wednesday Thursday Friday Saturday   1     2     3     4     5     6     7                8     9     10     11     12     13     14                15     16     17     18     19     20     21                22     23     24     25    FOLLOW UP PG  10:45 AM   (30 min.)   [address] [facility] [place] [address]     01     90                79     09     21

## 2023-09-20 ENCOUNTER — TELEPHONE (OUTPATIENT)
Dept: HEMATOLOGY ONCOLOGY | Facility: CLINIC | Age: 78
End: 2023-09-20

## 2023-09-20 NOTE — TELEPHONE ENCOUNTER
Appointment Change  Cancel, Reschedule, Change to Virtual      Who are you speaking with? Spouse   If it is not the patient, is the caller listed on the communication consent form? Yes   Which provider is the appointment scheduled with? SONDRA Yadav   When was the original appointment scheduled? Please list date and time 10/25/23 11:00AM   At which location is the appointment scheduled to take place? [facility]   Was the appointment rescheduled? Was the appointment changed from an in person visit to a virtual visit? If so, please list the details of the change. 10/31/23 4:00PM- did not want virtual   What is the reason for the appointment change? provider       Was STAR transport scheduled? No   Does STAR transport need to be scheduled for the new visit (if applicable) No   Does the patient need an infusion appointment rescheduled? No   Does the patient have an upcoming infusion appointment scheduled? If so, when? No   Is the patient undergoing chemotherapy? No   For appointments cancelled with less than 24 hours:  Was the no-show policy reviewed?  No

## 2023-10-05 ENCOUNTER — TELEPHONE (OUTPATIENT)
Dept: HEMATOLOGY ONCOLOGY | Facility: CLINIC | Age: 78
End: 2023-10-05

## 2023-10-05 DIAGNOSIS — E53.8 B12 DEFICIENCY: Primary | ICD-10-CM

## 2023-10-05 RX ORDER — CYANOCOBALAMIN 1000 UG/ML
1000 INJECTION, SOLUTION INTRAMUSCULAR; SUBCUTANEOUS ONCE
OUTPATIENT
Start: 2023-10-09

## 2023-10-05 NOTE — TELEPHONE ENCOUNTER
I am reaching out regarding your appointment with Braulio Whitley on 10/31/23    There has been a change to the providers schedule, and we will need to reschedule your appointment. I left a voicemail explaining to patient that this appointment will need to be rescheduled due to a change in the providers schedule.  Patient was advised to call [facility] [phone] to reschedule    PATIENT CAN BE A CE IF THEY DO NOT WANT TO WAIT

## 2023-10-09 ENCOUNTER — HOSPITAL ENCOUNTER (OUTPATIENT)
Dept: INFUSION CENTER | Facility: CLINIC | Age: 78
Discharge: HOME | End: 2023-10-09
Payer: COMMERCIAL

## 2023-10-09 DIAGNOSIS — E53.8 B12 DEFICIENCY: Primary | ICD-10-CM

## 2023-10-09 PROCEDURE — 96372 THER/PROPH/DIAG INJ SC/IM: CPT

## 2023-10-09 RX ORDER — CYANOCOBALAMIN 1000 UG/ML
1000 INJECTION, SOLUTION INTRAMUSCULAR; SUBCUTANEOUS ONCE
Status: COMPLETED | OUTPATIENT
Start: 2023-10-09 | End: 2023-10-09

## 2023-10-09 RX ORDER — CYANOCOBALAMIN 1000 UG/ML
1000 INJECTION, SOLUTION INTRAMUSCULAR; SUBCUTANEOUS ONCE
OUTPATIENT
Start: 2023-11-06

## 2023-10-09 RX ADMIN — CYANOCOBALAMIN 1000 MCG: 1000 INJECTION, SOLUTION INTRAMUSCULAR at 16:02

## 2023-10-09 NOTE — PROGRESS NOTES
Patient tolerated B12 injection into left deltoid today without complications. Patient and  aware to schedule next appointment before leaving. Print out declined.

## 2023-10-10 ENCOUNTER — TELEPHONE (OUTPATIENT)
Dept: HEMATOLOGY ONCOLOGY | Facility: CLINIC | Age: 78
End: 2023-10-10

## 2023-10-10 NOTE — TELEPHONE ENCOUNTER
I called [facility] regarding an appointment that they have scheduled with SONDRA Sinha scheduled on 10/31/23     Patients spouse started yelling and informed me to call back when he's not busy.  Another attempt will be made to reschedule

## 2023-10-12 ENCOUNTER — TELEPHONE (OUTPATIENT)
Dept: HEMATOLOGY ONCOLOGY | Facility: CLINIC | Age: 78
End: 2023-10-12

## 2023-10-12 NOTE — TELEPHONE ENCOUNTER
CE  DR susan MILIAN   Who are you speaking with? Spouse   If it is not the patient, are they listed on an active communication consent form? Yes   Is this a CE or DR susan MILIAN CE   Which provider is patient currently scheduled or established with? SONDRA Beckett   What is the original appointment date and time? 10/21/23 4PM   At which location is the appointment scheduled to take place? [facility]   Which provider is the patient transitioning care to? Betty Sotomayor PA-C   What is the new appointment date and time? 11/02/23 2:30PM   At which location is the new appointment scheduled to take place? [facility]   What is the reason for this change?  Provider

## 2023-10-17 ENCOUNTER — APPOINTMENT (OUTPATIENT)
Dept: LAB | Facility: MEDICAL CENTER | Age: 78
End: 2023-10-17
Payer: COMMERCIAL

## 2023-10-17 DIAGNOSIS — D50.9 MICROCYTIC ANEMIA: ICD-10-CM

## 2023-10-17 DIAGNOSIS — D50.0 IRON DEFICIENCY ANEMIA DUE TO CHRONIC BLOOD LOSS: ICD-10-CM

## 2023-10-17 DIAGNOSIS — D50.8 IRON DEFICIENCY ANEMIA SECONDARY TO INADEQUATE DIETARY IRON INTAKE: ICD-10-CM

## 2023-10-17 DIAGNOSIS — E53.8 B12 DEFICIENCY: ICD-10-CM

## 2023-10-17 LAB
BASOPHILS # BLD AUTO: 0.04 THOUSANDS/ÂΜL (ref 0–0.1)
BASOPHILS NFR BLD AUTO: 1 % (ref 0–1)
EOSINOPHIL # BLD AUTO: 0.17 THOUSAND/ÂΜL (ref 0–0.61)
EOSINOPHIL NFR BLD AUTO: 3 % (ref 0–6)
ERYTHROCYTE [DISTWIDTH] IN BLOOD BY AUTOMATED COUNT: 13.8 % (ref 11.6–15.1)
FERRITIN SERPL-MCNC: 243 NG/ML (ref 11–307)
FOLATE SERPL-MCNC: 12.5 NG/ML
HCT VFR BLD AUTO: 42.1 % (ref 34.8–46.1)
HGB BLD-MCNC: 13.8 G/DL (ref 11.5–15.4)
IMM GRANULOCYTES # BLD AUTO: 0.01 THOUSAND/UL (ref 0–0.2)
IMM GRANULOCYTES NFR BLD AUTO: 0 % (ref 0–2)
IRON SATN MFR SERPL: 34 % (ref 15–50)
IRON SERPL-MCNC: 80 UG/DL (ref 50–212)
LYMPHOCYTES # BLD AUTO: 1.91 THOUSANDS/ÂΜL (ref 0.6–4.47)
LYMPHOCYTES NFR BLD AUTO: 34 % (ref 14–44)
MCH RBC QN AUTO: 30.4 PG (ref 26.8–34.3)
MCHC RBC AUTO-ENTMCNC: 32.8 G/DL (ref 31.4–37.4)
MCV RBC AUTO: 93 FL (ref 82–98)
MONOCYTES # BLD AUTO: 0.52 THOUSAND/ÂΜL (ref 0.17–1.22)
MONOCYTES NFR BLD AUTO: 9 % (ref 4–12)
NEUTROPHILS # BLD AUTO: 2.9 THOUSANDS/ÂΜL (ref 1.85–7.62)
NEUTS SEG NFR BLD AUTO: 53 % (ref 43–75)
NRBC BLD AUTO-RTO: 0 /100 WBCS
PLATELET # BLD AUTO: 238 THOUSANDS/UL (ref 149–390)
PMV BLD AUTO: 10.5 FL (ref 8.9–12.7)
RBC # BLD AUTO: 4.54 MILLION/UL (ref 3.81–5.12)
TIBC SERPL-MCNC: 233 UG/DL (ref 250–450)
UIBC SERPL-MCNC: 153 UG/DL (ref 155–355)
VIT B12 SERPL-MCNC: 911 PG/ML (ref 180–914)
WBC # BLD AUTO: 5.55 THOUSAND/UL (ref 4.31–10.16)

## 2023-10-17 PROCEDURE — 82728 ASSAY OF FERRITIN: CPT

## 2023-10-17 PROCEDURE — 85025 COMPLETE CBC W/AUTO DIFF WBC: CPT

## 2023-10-17 PROCEDURE — 83540 ASSAY OF IRON: CPT

## 2023-10-17 PROCEDURE — 83918 ORGANIC ACIDS TOTAL QUANT: CPT

## 2023-10-17 PROCEDURE — 83550 IRON BINDING TEST: CPT

## 2023-10-17 PROCEDURE — 36415 COLL VENOUS BLD VENIPUNCTURE: CPT

## 2023-10-17 PROCEDURE — 82746 ASSAY OF FOLIC ACID SERUM: CPT

## 2023-10-17 PROCEDURE — 82607 VITAMIN B-12: CPT

## 2023-10-20 LAB — METHYLMALONATE SERPL-SCNC: 440 NMOL/L (ref 0–378)

## 2023-11-01 DIAGNOSIS — E78.2 MIXED HYPERLIPIDEMIA: ICD-10-CM

## 2023-11-01 DIAGNOSIS — I10 BENIGN ESSENTIAL HTN: ICD-10-CM

## 2023-11-01 RX ORDER — SIMVASTATIN 10 MG
TABLET ORAL
Qty: 90 TABLET | Refills: 1 | Status: SHIPPED | OUTPATIENT
Start: 2023-11-01

## 2023-11-02 ENCOUNTER — OFFICE VISIT (OUTPATIENT)
Dept: HEMATOLOGY ONCOLOGY | Facility: CLINIC | Age: 78
End: 2023-11-02

## 2023-11-02 VITALS
WEIGHT: 204 LBS | BODY MASS INDEX: 37.31 KG/M2 | HEART RATE: 100 BPM | TEMPERATURE: 97.5 F | SYSTOLIC BLOOD PRESSURE: 126 MMHG | DIASTOLIC BLOOD PRESSURE: 78 MMHG | OXYGEN SATURATION: 95 %

## 2023-11-02 DIAGNOSIS — E53.8 B12 DEFICIENCY: Primary | ICD-10-CM

## 2023-11-02 DIAGNOSIS — E66.01 OBESITY, MORBID (HCC): ICD-10-CM

## 2023-11-02 DIAGNOSIS — D50.0 IRON DEFICIENCY ANEMIA DUE TO CHRONIC BLOOD LOSS: ICD-10-CM

## 2023-11-02 RX ORDER — CYANOCOBALAMIN 1000 UG/ML
1000 INJECTION, SOLUTION INTRAMUSCULAR; SUBCUTANEOUS ONCE
OUTPATIENT
Start: 2023-11-06

## 2023-11-02 RX ORDER — CYANOCOBALAMIN 1000 UG/ML
1000 INJECTION, SOLUTION INTRAMUSCULAR; SUBCUTANEOUS ONCE
Status: CANCELLED | OUTPATIENT
Start: 2023-12-04

## 2023-11-02 NOTE — PROGRESS NOTES
Hematology/Oncology Outpatient Follow-up  Shawn Aparicio 78 y.o. female 1945 [id number]    Date:  11/2/2023      Assessment and Plan:  1. B12 deficiency  Continues on monthly B12; B12 is good now, MMA still high  Advised B12 injection every other month. Follow up in 4 months.     - Vitamin B12; Future  - Folate; Future    2. Iron deficiency anemia due to chronic blood loss  2/2 chronic intermittent blood loss from Tana Fuchs lesions in hiatal hernia   Follow up labs in 3 months     - CBC and differential; Future  - Iron Panel (Includes Ferritin, Iron Sat%, Iron, and TIBC); Future    3. Obesity, morbid ([address])  Weight loss recommended       HPI:  23-year-old female presents for follow-up visit. She had an EGD on 5/15/2023 which showed a large type II hiatal hernia with Tana Fuchs lesions present, biopsies were negative for H. pylori, celiac. MMA level was found to be high, B12 borderline at 570. Feb 2023, ferritin 10, iron sat 6%. She had Venofer 200 mg weekly x 5 from June-July 2023. B12 injections weekly x 5 in June-Aug; then monthly Sept/Oct     ROS:   Feeling unchanged;  assisted   Review of Systems   Constitutional:  Negative for appetite change, chills, fatigue, fever and unexpected weight change. Respiratory:  Negative for cough and shortness of breath. Cardiovascular:  Negative for chest pain, palpitations and leg swelling. Gastrointestinal:  Negative for abdominal pain, blood in stool, constipation, diarrhea, nausea and vomiting. Genitourinary:  Negative for difficulty urinating, dysuria and hematuria. Musculoskeletal:  Negative for arthralgias. Skin: Negative. Neurological:  Negative for dizziness, weakness, light-headedness, numbness and headaches. Hematological: Negative. Psychiatric/Behavioral: Negative.        Past Medical History:   Diagnosis Date    Disease of thyroid gland     Hyperlipidemia     Hypertension        Past Surgical History:   Procedure Laterality Date    BREAST BIOPSY Left     BREAST CYST EXCISION Right 1983    COLONOSCOPY      TOTAL HIP ARTHROPLASTY Bilateral     TOTAL KNEE ARTHROPLASTY Right     UPPER GASTROINTESTINAL ENDOSCOPY         Social History     Socioeconomic History    Marital status: /Civil Union     Spouse name: None    Number of children: None    Years of education: None    Highest education level: None   Occupational History    None   Tobacco Use    Smoking status: Never    Smokeless tobacco: Never   Vaping Use    Vaping Use: Never used   Substance and Sexual Activity    Alcohol use: No    Drug use: No    Sexual activity: None   Other Topics Concern    None   Social History Narrative    None     Social Determinants of Health     Financial Resource Strain: Not on file   Food Insecurity: Not on file   Transportation Needs: Not on file   Physical Activity: Not on file   Stress: Not on file   Social Connections: Not on file   Intimate Partner Violence: Not on file   Housing Stability: Not on file       Family History   Problem Relation Age of Onset    No Known Problems Mother     No Known Problems Father     No Known Problems Maternal Grandmother     No Known Problems Maternal Grandfather     No Known Problems Paternal Grandmother     No Known Problems Paternal Grandfather     No Known Problems Maternal Aunt     No Known Problems Son        Allergies   Allergen Reactions    Epinephrine     Fluconazole     Naproxen     Shellfish-Derived Products - Food Allergy     Sulfa Antibiotics          Current Outpatient Medications:     Apoaequorin (Prevagen) 10 MG CAPS, Take by mouth, Disp: , Rfl:     Cholecalciferol (VITAMIN D3) 1000 units CAPS, Take 2,000 Units by mouth , Disp: , Rfl:     levothyroxine 50 mcg tablet, TAKE 1 TABLET (50 MCG TOTAL) BY MOUTH DAILY IN THE EARLY MORNING, Disp: 30 tablet, Rfl: 1    methylPREDNISolone 4 MG tablet therapy pack, Use as directed on package, Disp: 21 each, Rfl: 0    omeprazole (PriLOSEC) 40 MG capsule, Take 1 capsule (40 mg total) by mouth daily, Disp: 90 capsule, Rfl: 3    simvastatin (ZOCOR) 10 mg tablet, TAKE 1 TABLET BY MOUTH EVERY DAY, Disp: 90 tablet, Rfl: 1    verapamil (CALAN-SR) 180 mg CR tablet, TAKE 1 TABLET BY MOUTH EVERY DAY, Disp: 90 tablet, Rfl: 1    Physical Exam:  /78 (BP Location: Left arm, Patient Position: Sitting, Cuff Size: Large)   Pulse 100   Temp 97.5 °F (36.4 °C) (Temporal)   Wt 92.5 kg (204 lb)   SpO2 95%   BMI 37.31 kg/m²     Physical Exam  Vitals reviewed. Constitutional:       General: She is not in acute distress. Appearance: She is well-developed. She is not ill-appearing. HENT:      Head: Normocephalic and atraumatic. Eyes:      General: No scleral icterus. Conjunctiva/sclera: Conjunctivae normal.   Cardiovascular:      Rate and Rhythm: Normal rate and regular rhythm. Heart sounds: Normal heart sounds. No murmur heard. Pulmonary:      Effort: Pulmonary effort is normal. No respiratory distress. Breath sounds: Normal breath sounds. Abdominal:      Palpations: Abdomen is soft. Tenderness: There is no abdominal tenderness. Musculoskeletal:         General: No tenderness. Normal range of motion. Cervical back: Normal range of motion and neck supple. Right lower leg: No edema. Left lower leg: No edema. Lymphadenopathy:      Cervical: No cervical adenopathy. Skin:     General: Skin is warm and dry. Neurological:      Mental Status: She is alert and oriented to person, place, and time. Cranial Nerves: No cranial nerve deficit.    Psychiatric:         Mood and Affect: Mood normal.         Behavior: Behavior normal.       Labs:  Lab Results   Component Value Date    WBC 5.55 10/17/2023    HGB 13.8 10/17/2023    HCT 42.1 10/17/2023    MCV 93 10/17/2023     10/17/2023     I have spent 20 minutes with Patient and family today in which greater than 50% of this time was spent in counseling/coordination of care regarding Diagnostic results, Instructions for management, Impressions, Counseling / Coordination of care, Documenting in the medical record, Reviewing / ordering tests, medicine, procedures  , and Obtaining or reviewing history       Patient voiced understanding and agreement in the above discussion. Aware to contact our office with questions/symptoms in the interim. This note has been generated by voice recognition software system. Therefore, there may be spelling, grammar, and or syntax errors. Please contact if questions arise.

## 2023-11-06 ENCOUNTER — HOSPITAL ENCOUNTER (OUTPATIENT)
Dept: INFUSION CENTER | Facility: CLINIC | Age: 78
Discharge: HOME | End: 2023-11-06
Payer: COMMERCIAL

## 2023-11-06 DIAGNOSIS — E53.8 B12 DEFICIENCY: Primary | ICD-10-CM

## 2023-11-06 PROCEDURE — 96372 THER/PROPH/DIAG INJ SC/IM: CPT

## 2023-11-06 RX ORDER — CYANOCOBALAMIN 1000 UG/ML
1000 INJECTION, SOLUTION INTRAMUSCULAR; SUBCUTANEOUS ONCE
Status: COMPLETED | OUTPATIENT
Start: 2023-11-06 | End: 2023-11-06

## 2023-11-06 RX ORDER — CYANOCOBALAMIN 1000 UG/ML
1000 INJECTION, SOLUTION INTRAMUSCULAR; SUBCUTANEOUS ONCE
OUTPATIENT
Start: 2024-01-01

## 2023-11-06 RX ADMIN — CYANOCOBALAMIN 1000 MCG: 1000 INJECTION INTRAMUSCULAR; SUBCUTANEOUS at 15:54

## 2023-11-06 NOTE — PROGRESS NOTES
Patient presents today for vitamin b12 injection. Patient offers no complaints. Vitamin b12 injection administered into left deltoid, tolerated wtihout incident. Patient to make additional appointments upon exit. AVS declined.

## 2023-11-10 DIAGNOSIS — E03.9 ACQUIRED HYPOTHYROIDISM: ICD-10-CM

## 2023-11-10 RX ORDER — LEVOTHYROXINE SODIUM 0.05 MG/1
50 TABLET ORAL
Qty: 30 TABLET | Refills: 0 | Status: SHIPPED | OUTPATIENT
Start: 2023-11-10

## 2023-12-06 ENCOUNTER — OFFICE VISIT (OUTPATIENT)
Dept: FAMILY MEDICINE CLINIC | Facility: CLINIC | Age: 78
End: 2023-12-06
Payer: COMMERCIAL

## 2023-12-06 ENCOUNTER — APPOINTMENT (OUTPATIENT)
Dept: LAB | Facility: MEDICAL CENTER | Age: 78
End: 2023-12-06
Payer: COMMERCIAL

## 2023-12-06 VITALS
OXYGEN SATURATION: 95 % | BODY MASS INDEX: 38.28 KG/M2 | SYSTOLIC BLOOD PRESSURE: 120 MMHG | HEIGHT: 62 IN | TEMPERATURE: 97.6 F | DIASTOLIC BLOOD PRESSURE: 74 MMHG | WEIGHT: 208 LBS | HEART RATE: 81 BPM

## 2023-12-06 DIAGNOSIS — I10 BENIGN ESSENTIAL HTN: Primary | ICD-10-CM

## 2023-12-06 DIAGNOSIS — E03.9 ACQUIRED HYPOTHYROIDISM: ICD-10-CM

## 2023-12-06 DIAGNOSIS — E78.2 MIXED HYPERLIPIDEMIA: ICD-10-CM

## 2023-12-06 LAB
ALBUMIN SERPL BCP-MCNC: 4.5 G/DL (ref 3.5–5)
ALP SERPL-CCNC: 91 U/L (ref 34–104)
ALT SERPL W P-5'-P-CCNC: 12 U/L (ref 7–52)
ANION GAP SERPL CALCULATED.3IONS-SCNC: 12 MMOL/L
AST SERPL W P-5'-P-CCNC: 19 U/L (ref 13–39)
BILIRUB SERPL-MCNC: 0.27 MG/DL (ref 0.2–1)
BUN SERPL-MCNC: 17 MG/DL (ref 5–25)
CALCIUM SERPL-MCNC: 9.5 MG/DL (ref 8.4–10.2)
CHLORIDE SERPL-SCNC: 103 MMOL/L (ref 96–108)
CO2 SERPL-SCNC: 25 MMOL/L (ref 21–32)
CREAT SERPL-MCNC: 1.02 MG/DL (ref 0.6–1.3)
GFR SERPL CREATININE-BSD FRML MDRD: 52 ML/MIN/1.73SQ M
GLUCOSE P FAST SERPL-MCNC: 116 MG/DL (ref 65–99)
POTASSIUM SERPL-SCNC: 4.2 MMOL/L (ref 3.5–5.3)
PROT SERPL-MCNC: 7.7 G/DL (ref 6.4–8.4)
SODIUM SERPL-SCNC: 140 MMOL/L (ref 135–147)
TSH SERPL DL<=0.05 MIU/L-ACNC: 4.35 UIU/ML (ref 0.45–4.5)

## 2023-12-06 PROCEDURE — 80053 COMPREHEN METABOLIC PANEL: CPT

## 2023-12-06 PROCEDURE — 36415 COLL VENOUS BLD VENIPUNCTURE: CPT

## 2023-12-06 PROCEDURE — 99213 OFFICE O/P EST LOW 20 MIN: CPT | Performed by: FAMILY MEDICINE

## 2023-12-06 PROCEDURE — 84443 ASSAY THYROID STIM HORMONE: CPT

## 2023-12-06 RX ORDER — LEVOTHYROXINE SODIUM 0.05 MG/1
50 TABLET ORAL
Qty: 30 TABLET | Refills: 0 | Status: CANCELLED | OUTPATIENT
Start: 2023-12-06

## 2023-12-06 NOTE — PROGRESS NOTES
Assessment/Plan:    1. Benign essential HTN    2. Acquired hypothyroidism  -     TSH, 3rd generation; Future  -     Comprehensive metabolic panel; Future    3. Mixed hyperlipidemia        There are no Patient Instructions on file for this visit. No follow-ups on file. Subjective:      Patient ID: Jason Monroe is a 78 y.o. female. Chief Complaint   Patient presents with    Follow-up     Patient needs medication refills       Here for med check. BP controlled. Due for labs, other BW from hematology for iron deficiency. Pt does not travel much out of the house,  is the caregiver. Lipids have been at goal on statin        The following portions of the patient's history were reviewed and updated as appropriate: allergies, current medications, past family history, past medical history, past social history, past surgical history and problem list.    Review of Systems   Constitutional:  Negative for fatigue and fever. HENT:  Negative for congestion. Eyes:  Negative for visual disturbance. Respiratory:  Negative for chest tightness and shortness of breath. Cardiovascular:  Negative for chest pain and palpitations. Gastrointestinal:  Negative for abdominal pain. Genitourinary:  Negative for difficulty urinating. Musculoskeletal:  Negative for arthralgias. Neurological:  Negative for headaches. Hematological:  Does not bruise/bleed easily.          Current Outpatient Medications   Medication Sig Dispense Refill    Apoaequorin (Prevagen) 10 MG CAPS Take by mouth      Cholecalciferol (VITAMIN D3) 1000 units CAPS Take 2,000 Units by mouth       levothyroxine 50 mcg tablet TAKE 1 TABLET (50 MCG TOTAL) BY MOUTH DAILY IN THE EARLY MORNING 30 tablet 0    omeprazole (PriLOSEC) 40 MG capsule Take 1 capsule (40 mg total) by mouth daily 90 capsule 3    simvastatin (ZOCOR) 10 mg tablet TAKE 1 TABLET BY MOUTH EVERY DAY 90 tablet 1    verapamil (CALAN-SR) 180 mg CR tablet TAKE 1 TABLET BY MOUTH EVERY DAY 90 tablet 1    methylPREDNISolone 4 MG tablet therapy pack Use as directed on package (Patient not taking: Reported on 12/6/2023) 21 each 0     No current facility-administered medications for this visit. Objective:    /74 (BP Location: Right arm, Patient Position: Sitting, Cuff Size: Large)   Pulse 81   Temp 97.6 °F (36.4 °C) (Temporal)   Ht 5' 2" (1.575 m)   Wt 94.3 kg (208 lb)   SpO2 95%   BMI 38.04 kg/m²        Physical Exam  Vitals reviewed. Constitutional:       Appearance: Normal appearance. She is well-developed. Cardiovascular:      Rate and Rhythm: Normal rate and regular rhythm. Heart sounds: Normal heart sounds. Pulmonary:      Effort: Pulmonary effort is normal.      Breath sounds: Normal breath sounds. Skin:     General: Skin is warm. Neurological:      General: No focal deficit present. Mental Status: She is alert and oriented to person, place, and time. Psychiatric:         Mood and Affect: Mood normal.         Behavior: Behavior normal.         Thought Content:  Thought content normal.         Judgment: Judgment normal.                Loretta Salas MD

## 2023-12-09 DIAGNOSIS — E03.9 ACQUIRED HYPOTHYROIDISM: ICD-10-CM

## 2023-12-11 RX ORDER — LEVOTHYROXINE SODIUM 0.05 MG/1
50 TABLET ORAL
Qty: 30 TABLET | Refills: 5 | Status: SHIPPED | OUTPATIENT
Start: 2023-12-11

## 2023-12-13 ENCOUNTER — TELEPHONE (OUTPATIENT)
Dept: FAMILY MEDICINE CLINIC | Facility: CLINIC | Age: 78
End: 2023-12-13

## 2023-12-13 NOTE — TELEPHONE ENCOUNTER
----- Message from Agustín Church LPN sent at [id number]  7:08 AM EST -----  Patient is overdue for AWV. Please call to schedule.

## 2023-12-13 NOTE — TELEPHONE ENCOUNTER
Called and spoke to - stated they will call and schedule at a later time. ( They were just here earlier in the month). I advised them that PCP's schedule books out.  They're aware and will call back later to schedule AWV

## 2023-12-18 ENCOUNTER — OFFICE VISIT (OUTPATIENT)
Dept: GASTROENTEROLOGY | Facility: AMBULARY SURGERY CENTER | Age: 78
End: 2023-12-18
Payer: COMMERCIAL

## 2023-12-18 VITALS
HEART RATE: 79 BPM | SYSTOLIC BLOOD PRESSURE: 130 MMHG | DIASTOLIC BLOOD PRESSURE: 74 MMHG | OXYGEN SATURATION: 97 % | BODY MASS INDEX: 37.73 KG/M2 | HEIGHT: 62 IN | WEIGHT: 205 LBS

## 2023-12-18 DIAGNOSIS — K25.0 HEMORRHAGE DUE TO ACUTE CAMERON LESION: ICD-10-CM

## 2023-12-18 DIAGNOSIS — D50.0 IRON DEFICIENCY ANEMIA DUE TO CHRONIC BLOOD LOSS: Primary | ICD-10-CM

## 2023-12-18 DIAGNOSIS — K29.60 EROSIVE GASTRITIS: ICD-10-CM

## 2023-12-18 DIAGNOSIS — K44.9 HIATAL HERNIA: ICD-10-CM

## 2023-12-18 PROCEDURE — 99214 OFFICE O/P EST MOD 30 MIN: CPT | Performed by: INTERNAL MEDICINE

## 2023-12-18 NOTE — PROGRESS NOTES
[facility] - Outpatient Consultation  Tanya Stevens 78 y.o. female MRN: [id number]  Encounter: [id number]      PCP: Alicia Linton MD  Referring: No referring provider defined for this encounter.      ASSESSMENT AND PLAN:      1. Iron deficiency anemia due to chronic blood loss  2. Hiatal hernia  3. Hemorrhage due to acute Jay lesion  4. Erosive gastritis  - recommend continued treatment with omeprazole 40 mg daily  - anemia is resolved- hgb 13.4 on recent labs, no overt GI bleeding  - continue follow up with hematology, primary regarding iron and vitamin b12 supplementation  ______________________________________________________________________    CC:  Chief Complaint   Patient presents with    Follow-up     Anemia. No GI symptoms per pt        HPI:      Patient is an 78-year-old female who sees me in follow-up for acute GI blood loss anemia-she is present with her  at today's visit, who contributes to history.    EGD 5/2023 - large hiatal hernia with Jay lesions present, biopsies were negative for H. pylori, celiac.     Continues to follow with hematology, receiving IV Venofer, every other month B12 injections, Hemoglobin 13.8 on CBC in October 2023. Denies overt GI bleeding.    Abdominal Pain  The problem has been unchanged. Pertinent negatives include no anorexia, arthralgias, belching, constipation, diarrhea, dysuria, fever, flatus, frequency, headaches, hematochezia, hematuria, melena, myalgias, nausea, vomiting or weight loss. Nothing aggravates the pain. The pain is relieved by Nothing. Prior diagnostic workup includes upper endoscopy.           REVIEW OF SYSTEMS:    CONSTITUTIONAL: Denies any fever, chills, rigors, and weight loss.  HEENT: No earache or tinnitus. Denies hearing loss or visual disturbances.  CARDIOVASCULAR: No chest pain or palpitations.   RESPIRATORY: Denies any cough, hemoptysis, shortness of breath or dyspnea on exertion.  GASTROINTESTINAL:  "As noted in the History of Present Illness.   GENITOURINARY: No problems with urination. Denies any hematuria or dysuria.  NEUROLOGIC: No dizziness or vertigo, denies headaches.   MUSCULOSKELETAL: Denies any muscle or joint pain.   SKIN: Denies skin rashes or itching.   ENDOCRINE: Denies excessive thirst. Denies intolerance to heat or cold.  PSYCHOSOCIAL: Denies depression or anxiety. Denies any recent memory loss.       Historical Information   Past Medical History:   Diagnosis Date    Disease of thyroid gland     Hyperlipidemia     Hypertension      Past Surgical History:   Procedure Laterality Date    BREAST BIOPSY Left     BREAST CYST EXCISION Right 1983    COLONOSCOPY      TOTAL HIP ARTHROPLASTY Bilateral     TOTAL KNEE ARTHROPLASTY Right     UPPER GASTROINTESTINAL ENDOSCOPY       Social History   Social History     Substance and Sexual Activity   Alcohol Use No     Social History     Substance and Sexual Activity   Drug Use No     Social History     Tobacco Use   Smoking Status Never   Smokeless Tobacco Never     Family History   Problem Relation Age of Onset    No Known Problems Mother     No Known Problems Father     No Known Problems Maternal Grandmother     No Known Problems Maternal Grandfather     No Known Problems Paternal Grandmother     No Known Problems Paternal Grandfather     No Known Problems Maternal Aunt     No Known Problems Son        Meds/Allergies       Current Outpatient Medications:     Apoaequorin (Prevagen) 10 MG CAPS    Cholecalciferol (VITAMIN D3) 1000 units CAPS    levothyroxine 50 mcg tablet    omeprazole (PriLOSEC) 40 MG capsule    simvastatin (ZOCOR) 10 mg tablet    verapamil (CALAN-SR) 180 mg CR tablet    methylPREDNISolone 4 MG tablet therapy pack    Allergies   Allergen Reactions    Epinephrine     Fluconazole     Naproxen     Shellfish-Derived Products - Food Allergy     Sulfa Antibiotics            Objective     Blood pressure 130/74, pulse 79, height 5' 2\" (1.575 m), weight " "93 kg (205 lb), SpO2 97%. Body mass index is 37.49 kg/m².     PHYSICAL EXAM:      General Appearance:   Alert, cooperative, no distress   HEENT:   Normocephalic, atraumatic, anicteric.     Neck:  Supple, symmetrical, trachea midline   Lungs:   Clear to auscultation bilaterally; no rales, rhonchi or wheezing; respirations unlabored    Heart::   Regular rate and rhythm; no murmur, rub, or gallop.   Abdomen:   Soft, non-tender, non-distended; normal bowel sounds; no masses, no organomegaly    Genitalia:   Deferred    Rectal:   Deferred    Extremities:  No cyanosis, clubbing or edema    Pulses:  2+ and symmetric    Skin:  No jaundice, rashes, or lesions    Lymph nodes:  No palpable cervical lymphadenopathy        Lab Results:     Lab Results   Component Value Date    WBC 5.55 10/17/2023    HGB 13.8 10/17/2023    HCT 42.1 10/17/2023    MCV 93 10/17/2023     10/17/2023       Lab Results   Component Value Date     01/12/2016    K 4.2 12/06/2023     12/06/2023    CO2 25 12/06/2023    BUN 17 12/06/2023    CREATININE 1.02 12/06/2023    GLUF 116 (H) 12/06/2023    CALCIUM 9.5 12/06/2023    AST 19 12/06/2023    ALT 12 12/06/2023    ALKPHOS 91 12/06/2023    PROT 7.3 01/12/2016    BILITOT 0.6 01/12/2016    EGFR 52 12/06/2023       No results found for: \"INR\", \"PROTIME\"      Radiology Results:   No results found.    Portions of the record may have been created with voice recognition software. Occasional wrong word or \"sound a like\" substitutions may have occurred due to the inherent limitations of voice recognition software. Read the chart carefully and recognize, using context, where substitutions have occurred.        "

## 2023-12-28 DIAGNOSIS — E53.8 B12 DEFICIENCY: Primary | ICD-10-CM

## 2024-01-01 ENCOUNTER — HOME CARE VISIT (OUTPATIENT)
Dept: HOME HEALTH SERVICES | Facility: HOME HEALTHCARE | Age: 79
End: 2024-01-01
Payer: MEDICARE

## 2024-01-01 ENCOUNTER — HOME CARE VISIT (OUTPATIENT)
Dept: HOME HOSPICE | Facility: HOSPICE | Age: 79
End: 2024-01-01
Payer: MEDICARE

## 2024-01-01 VITALS
RESPIRATION RATE: 22 BRPM | HEART RATE: 74 BPM | TEMPERATURE: 98.4 F | DIASTOLIC BLOOD PRESSURE: 90 MMHG | SYSTOLIC BLOOD PRESSURE: 138 MMHG

## 2024-01-01 PROCEDURE — G0156 HHCP-SVS OF AIDE,EA 15 MIN: HCPCS

## 2024-01-01 PROCEDURE — G0299 HHS/HOSPICE OF RN EA 15 MIN: HCPCS

## 2024-01-02 ENCOUNTER — HOSPITAL ENCOUNTER (OUTPATIENT)
Dept: INFUSION CENTER | Facility: CLINIC | Age: 79
Discharge: HOME | End: 2024-01-02
Payer: COMMERCIAL

## 2024-01-02 DIAGNOSIS — E53.8 B12 DEFICIENCY: Primary | ICD-10-CM

## 2024-01-02 RX ORDER — CYANOCOBALAMIN 1000 UG/ML
1000 INJECTION, SOLUTION INTRAMUSCULAR; SUBCUTANEOUS ONCE
OUTPATIENT
Start: 2024-02-26

## 2024-01-02 RX ORDER — CYANOCOBALAMIN 1000 UG/ML
1000 INJECTION, SOLUTION INTRAMUSCULAR; SUBCUTANEOUS ONCE
Status: COMPLETED | OUTPATIENT
Start: 2024-01-02 | End: 2024-01-02

## 2024-01-02 RX ADMIN — CYANOCOBALAMIN 1000 MCG: 1000 INJECTION, SOLUTION INTRAMUSCULAR at 16:38

## 2024-01-02 NOTE — PROGRESS NOTES
Patient to infusion center for B12 injection.  She offers no complaints.  B12 given in Left Arm, tolerated well.  Next appointment confirmed for 2/27/24 4:30 she and spouse declined AVS

## 2024-01-09 ENCOUNTER — TELEPHONE (OUTPATIENT)
Dept: FAMILY MEDICINE CLINIC | Facility: CLINIC | Age: 79
End: 2024-01-09

## 2024-01-09 NOTE — TELEPHONE ENCOUNTER
----- Message from Clarissa Garcia LPN sent at 1/8/2024  5:03 PM EST -----  Patient overdue for AWV. Please call and schedule.

## 2024-01-30 ENCOUNTER — TELEPHONE (OUTPATIENT)
Dept: HEMATOLOGY ONCOLOGY | Facility: CLINIC | Age: 79
End: 2024-01-30

## 2024-01-30 NOTE — TELEPHONE ENCOUNTER
CE  DR susan MILIAN   Who are you speaking with? Patient   If it is not the patient, are they listed on an active communication consent form? N/A   Is this a CE or DR susan MILIAN CE   Which provider is patient currently scheduled or established with? SONDRA Knutson   What is the original appointment date and time? 03/20/2024 @ 4PM    At which location is the appointment scheduled to take place? [facility]   Which provider is the patient transitioning care to? Jina Narvaez PA-C   What is the new appointment date and time? 04/15/2024 @3PM    At which location is the new appointment scheduled to take place? [facility]   What is the reason for this change? Provider leaving in April

## 2024-02-21 PROBLEM — Z12.39 SCREENING FOR BREAST CANCER: Status: RESOLVED | Noted: 2019-01-08 | Resolved: 2024-02-21

## 2024-02-21 PROBLEM — Z00.00 MEDICARE ANNUAL WELLNESS VISIT, SUBSEQUENT: Status: RESOLVED | Noted: 2019-01-07 | Resolved: 2024-02-21

## 2024-02-27 ENCOUNTER — HOSPITAL ENCOUNTER (OUTPATIENT)
Dept: INFUSION CENTER | Facility: CLINIC | Age: 79
Discharge: HOME | End: 2024-02-27
Payer: COMMERCIAL

## 2024-02-27 DIAGNOSIS — E53.8 B12 DEFICIENCY: Primary | ICD-10-CM

## 2024-02-27 PROCEDURE — 96372 THER/PROPH/DIAG INJ SC/IM: CPT

## 2024-02-27 RX ORDER — CYANOCOBALAMIN 1000 UG/ML
1000 INJECTION, SOLUTION INTRAMUSCULAR; SUBCUTANEOUS ONCE
Status: COMPLETED | OUTPATIENT
Start: 2024-02-27 | End: 2024-02-27

## 2024-02-27 RX ORDER — CYANOCOBALAMIN 1000 UG/ML
1000 INJECTION, SOLUTION INTRAMUSCULAR; SUBCUTANEOUS ONCE
OUTPATIENT
Start: 2024-04-23

## 2024-02-27 RX ADMIN — CYANOCOBALAMIN 1000 MCG: 1000 INJECTION, SOLUTION INTRAMUSCULAR at 16:27

## 2024-02-27 NOTE — PROGRESS NOTES
Patient arrives to infusion center for B12 injection. Patient tolerated B12 injection to LEFT deltoid, bandaid in place. Patient aware of next appt 4/23 at 1630, patient declines AVS (utilizes [employer]).

## 2024-04-10 ENCOUNTER — TELEPHONE (OUTPATIENT)
Dept: FAMILY MEDICINE CLINIC | Facility: CLINIC | Age: 79
End: 2024-04-10

## 2024-04-10 NOTE — TELEPHONE ENCOUNTER
----- Message from Ana Cristina Cueva sent at 4/9/2024  1:31 PM EDT -----  Regarding: RE: awv  Patient refuses AWV's  ----- Message -----  From: Lu Caputo  Sent: 4/9/2024   1:17 PM EDT  To: [facility]  Subject: awv                                              Patient due for AWV, please contact patient to schedule.

## 2024-04-11 ENCOUNTER — TELEPHONE (OUTPATIENT)
Dept: HEMATOLOGY ONCOLOGY | Facility: CLINIC | Age: 79
End: 2024-04-11

## 2024-04-12 ENCOUNTER — APPOINTMENT (OUTPATIENT)
Dept: LAB | Facility: MEDICAL CENTER | Age: 79
End: 2024-04-12
Payer: COMMERCIAL

## 2024-04-12 DIAGNOSIS — D50.0 IRON DEFICIENCY ANEMIA DUE TO CHRONIC BLOOD LOSS: ICD-10-CM

## 2024-04-12 DIAGNOSIS — E53.8 B12 DEFICIENCY: ICD-10-CM

## 2024-04-12 LAB
BASOPHILS # BLD AUTO: 0.03 THOUSANDS/ÂΜL (ref 0–0.1)
BASOPHILS NFR BLD AUTO: 0 % (ref 0–1)
EOSINOPHIL # BLD AUTO: 0.17 THOUSAND/ÂΜL (ref 0–0.61)
EOSINOPHIL NFR BLD AUTO: 2 % (ref 0–6)
ERYTHROCYTE [DISTWIDTH] IN BLOOD BY AUTOMATED COUNT: 13.2 % (ref 11.6–15.1)
FERRITIN SERPL-MCNC: 176 NG/ML (ref 11–307)
FOLATE SERPL-MCNC: 9.8 NG/ML
HCT VFR BLD AUTO: 39.6 % (ref 34.8–46.1)
HGB BLD-MCNC: 12.6 G/DL (ref 11.5–15.4)
IMM GRANULOCYTES # BLD AUTO: 0.02 THOUSAND/UL (ref 0–0.2)
IMM GRANULOCYTES NFR BLD AUTO: 0 % (ref 0–2)
IRON SATN MFR SERPL: 20 % (ref 15–50)
IRON SERPL-MCNC: 42 UG/DL (ref 50–212)
LYMPHOCYTES # BLD AUTO: 1.45 THOUSANDS/ÂΜL (ref 0.6–4.47)
LYMPHOCYTES NFR BLD AUTO: 21 % (ref 14–44)
MCH RBC QN AUTO: 30 PG (ref 26.8–34.3)
MCHC RBC AUTO-ENTMCNC: 31.8 G/DL (ref 31.4–37.4)
MCV RBC AUTO: 94 FL (ref 82–98)
MONOCYTES # BLD AUTO: 0.64 THOUSAND/ÂΜL (ref 0.17–1.22)
MONOCYTES NFR BLD AUTO: 9 % (ref 4–12)
NEUTROPHILS # BLD AUTO: 4.71 THOUSANDS/ÂΜL (ref 1.85–7.62)
NEUTS SEG NFR BLD AUTO: 68 % (ref 43–75)
NRBC BLD AUTO-RTO: 0 /100 WBCS
PLATELET # BLD AUTO: 253 THOUSANDS/UL (ref 149–390)
PMV BLD AUTO: 10.3 FL (ref 8.9–12.7)
RBC # BLD AUTO: 4.2 MILLION/UL (ref 3.81–5.12)
TIBC SERPL-MCNC: 215 UG/DL (ref 250–450)
UIBC SERPL-MCNC: 173 UG/DL (ref 155–355)
VIT B12 SERPL-MCNC: 575 PG/ML (ref 180–914)
WBC # BLD AUTO: 7.02 THOUSAND/UL (ref 4.31–10.16)

## 2024-04-12 PROCEDURE — 82746 ASSAY OF FOLIC ACID SERUM: CPT

## 2024-04-12 PROCEDURE — 83540 ASSAY OF IRON: CPT

## 2024-04-12 PROCEDURE — 82607 VITAMIN B-12: CPT

## 2024-04-12 PROCEDURE — 82728 ASSAY OF FERRITIN: CPT

## 2024-04-12 PROCEDURE — 85025 COMPLETE CBC W/AUTO DIFF WBC: CPT

## 2024-04-12 PROCEDURE — 83550 IRON BINDING TEST: CPT

## 2024-04-12 PROCEDURE — 36415 COLL VENOUS BLD VENIPUNCTURE: CPT

## 2024-04-15 ENCOUNTER — OFFICE VISIT (OUTPATIENT)
Dept: HEMATOLOGY ONCOLOGY | Facility: CLINIC | Age: 79
End: 2024-04-15
Payer: COMMERCIAL

## 2024-04-15 VITALS
HEIGHT: 62 IN | RESPIRATION RATE: 17 BRPM | HEART RATE: 99 BPM | OXYGEN SATURATION: 96 % | DIASTOLIC BLOOD PRESSURE: 74 MMHG | TEMPERATURE: 97.7 F | SYSTOLIC BLOOD PRESSURE: 108 MMHG | WEIGHT: 202 LBS | BODY MASS INDEX: 37.17 KG/M2

## 2024-04-15 DIAGNOSIS — R79.89 HIGH SERUM METHYLMALONIC ACID: ICD-10-CM

## 2024-04-15 DIAGNOSIS — D50.8 IRON DEFICIENCY ANEMIA SECONDARY TO INADEQUATE DIETARY IRON INTAKE: ICD-10-CM

## 2024-04-15 DIAGNOSIS — N18.31 STAGE 3A CHRONIC KIDNEY DISEASE (HCC): ICD-10-CM

## 2024-04-15 DIAGNOSIS — E66.01 OBESITY, MORBID (HCC): ICD-10-CM

## 2024-04-15 DIAGNOSIS — E53.8 LOW FOLATE: Primary | ICD-10-CM

## 2024-04-15 PROCEDURE — G2211 COMPLEX E/M VISIT ADD ON: HCPCS | Performed by: PHYSICIAN ASSISTANT

## 2024-04-15 PROCEDURE — 99214 OFFICE O/P EST MOD 30 MIN: CPT | Performed by: PHYSICIAN ASSISTANT

## 2024-04-15 NOTE — PROGRESS NOTES
Hematology/Oncology Outpatient Follow- up Note  Tanya Stevens 78 y.o. female MRN: @ Encounter: [id number]        Date:  4/15/2024      Assessment / Plan:      History of MARSHALL.    April 2022 hemoglobin 13.6, MCV 88    Feb 2023 hemoglobin 9.2, MCV 77, ferritin 10, iron sat 6%.  Hgb and iron did not improve with oral iron.    5/15/2023 EGD-  large type II hiatal hernia with Jay lesions present    6/2023 Venofer 200 mg weekly x 5     4/12/24 Hemoglobin 12.6, MCV 94, white blood cell count 7.02, platelets 253.  ferritin 176    2.  Elevated MMA.  3/29/23 B12 564, ,     June-Aug 2023 B12 injections weekly x 5 ; then monthly since 8/2023.    7/23 ; B12 1339  10/23 ; B12 911; folate 12.5    4/2024  B12 575     Orders Placed This Encounter   Procedures    Intrinsic factor blocking antibody    Anti-parietal antibody    Vitamin B12    Methylmalonic acid, serum    CBC and differential    Comprehensive metabolic panel    Homocysteine        F/U in 6 months.         HPI:  Tanya Stevens is an 78-year-old female seen initially 3/29/23 regarding MARSHALL.      Past medical history of hypertension, hypothyroidism, hyperlipidemia, and dementia.     April 2022 hemoglobin 13.6, MCV 88    November 2022 hemoglobin 11.1, MCV 85    Feb 2023 ferritin 10, iron sat 6%, hemoglobin 9.2, MCV 77    3/29/23 B12 564, , ferritin 17      She was noted to have a decline in her hemoglobin back in November 2022. Most recently CBC demonstrated a hemoglobin of 9.2 with an MCV of 77. Platelets and white blood cells are within normal limits. Iron panel had a ferritin of 10 with an iron saturation 6%, TIBC 393, serum iron 22. She was started on oral iron at that time and has been tolerating it once daily for the last 1 month.      Her  provided most of the history given her dementia.    She previously donated blood regularly and was denied a few times due to having low iron but has never required IV iron or blood  transfusion in the past.    She denied ever being told that she or a family member is diagnosed with thalassemia.  She denied history of gastric bypass or colon resection.     She denied pagophagia, pica, restless leg syndrome, brittle nails, thinning hair, or pallor.  She denied coffee ground stools, tarry stools, bright red blood per rectum, hematuria, or vaginal bleeding.  She denied fatigue, lightheadedness, dizziness, shortness of breath, WILKINSON, palpitations, or chest pain.  She denied fevers, chills, unintentional weight loss, abdominal pain/distension, nausea, vomiting, constipation, diarrhea, petechiae/purpura, unexplained ecchymosis, or LE swelling.    She does not believe she ever had a colonoscopy. She was seen by gastroenterology and has declined colonoscopy due to the prep. She was scheduled for EGD last month but had eaten breakfast the morning of her procedure which was then canceled.    May 8, 2023 hemoglobin 10.7, MCV 84    She had an EGD on 5/15/2023 which showed a large type II hiatal hernia with Jay lesions present, biopsies were negative for H. pylori, celiac.        June-July 2023. She had Venofer 200 mg weekly x 5      June-Aug 2023 B12 injections weekly x 5 ; then monthly Sept/Oct     July 2023 hemoglobin 13.4; B12 1339; , folate 11.6, ferritin 436    October 2023 hemoglobin 13.8, B12 911; ferritin 243    Interval History  4/12/24 Hemoglobin 12.6, MCV 94, white blood cell count 7.02, platelets 253.  B12 575, ferritin 176        Interval History:        Review of Systems   Constitutional:  Negative for appetite change, chills, diaphoresis, fatigue, fever and unexpected weight change.   HENT:   Negative for mouth sores, nosebleeds, sore throat, tinnitus and voice change.    Eyes:  Negative for eye problems.   Respiratory:  Negative for chest tightness, cough, shortness of breath and wheezing.    Cardiovascular:  Negative for chest pain, leg swelling and palpitations.  "  Gastrointestinal:  Negative for abdominal distention, abdominal pain, blood in stool, constipation, diarrhea, nausea, rectal pain and vomiting.   Endocrine: Negative for hot flashes.   Genitourinary: Negative.     Musculoskeletal:  Negative for gait problem and myalgias.   Skin:  Negative for itching and rash.   Neurological:  Negative for dizziness, gait problem, headaches, light-headedness and numbness.   Hematological:  Negative for adenopathy.   Psychiatric/Behavioral:  Negative for confusion and sleep disturbance. The patient is not nervous/anxious.         Test Results:        Labs:   Lab Results   Component Value Date    HGB 12.6 04/12/2024    HCT 39.6 04/12/2024    MCV 94 04/12/2024     04/12/2024    WBC 7.02 04/12/2024    NRBC 0 04/12/2024     Lab Results   Component Value Date     01/12/2016    K 4.2 12/06/2023     12/06/2023    CO2 25 12/06/2023    BUN 17 12/06/2023    CREATININE 1.02 12/06/2023    GLUF 116 (H) 12/06/2023    CALCIUM 9.5 12/06/2023    AST 19 12/06/2023    ALT 12 12/06/2023    ALKPHOS 91 12/06/2023    PROT 7.3 01/12/2016    BILITOT 0.6 01/12/2016    EGFR 52 12/06/2023           Imaging: No results found.          Allergies:   Allergies   Allergen Reactions    Epinephrine     Fluconazole     Naproxen     Shellfish-Derived Products - Food Allergy     Sulfa Antibiotics      Current Medications: Reviewed  PMH/FH/SH:  Reviewed      Physical Exam:    There is no height or weight on file to calculate BSA.    Ht Readings from Last 3 Encounters:   12/18/23 5' 2\" (1.575 m)   12/06/23 5' 2\" (1.575 m)   08/02/23 5' 2\" (1.575 m)        Wt Readings from Last 3 Encounters:   12/18/23 93 kg (205 lb)   12/06/23 94.3 kg (208 lb)   11/02/23 92.5 kg (204 lb)        Temp Readings from Last 3 Encounters:   12/06/23 97.6 °F (36.4 °C) (Temporal)   11/02/23 97.5 °F (36.4 °C) (Temporal)   08/01/23 (!) 97.3 °F (36.3 °C) (Temporal)        BP Readings from Last 3 Encounters:   12/18/23 130/74 "   12/06/23 120/74   11/02/23 126/78             Physical Exam  Constitutional:       Appearance: She is well-developed.   HENT:      Head: Normocephalic and atraumatic.   Cardiovascular:      Rate and Rhythm: Normal rate and regular rhythm.   Pulmonary:      Effort: Pulmonary effort is normal. No respiratory distress.      Breath sounds: Normal breath sounds. No wheezing or rhonchi.   Skin:     General: Skin is warm and dry.   Neurological:      Mental Status: She is alert.   Psychiatric:         Behavior: Behavior normal.           Emergency Contacts:    Extended Emergency Contact Information  Primary Emergency Contact: Pankaj Stevens  Home Phone: [phone]  Relation: Spouse  Secondary Emergency Contact: Hilda 2ndPankaj  Address: [address] [place] [address] [place]  Work Phone: [phone]  Mobile Phone: [phone]  Relation: Son

## 2024-04-23 ENCOUNTER — HOSPITAL ENCOUNTER (OUTPATIENT)
Dept: INFUSION CENTER | Facility: CLINIC | Age: 79
Discharge: HOME | End: 2024-04-23
Payer: COMMERCIAL

## 2024-04-23 DIAGNOSIS — E53.8 LOW FOLATE: Primary | ICD-10-CM

## 2024-04-23 PROCEDURE — 96372 THER/PROPH/DIAG INJ SC/IM: CPT

## 2024-04-23 RX ORDER — CYANOCOBALAMIN 1000 UG/ML
1000 INJECTION, SOLUTION INTRAMUSCULAR; SUBCUTANEOUS ONCE
OUTPATIENT
Start: 2024-06-18

## 2024-04-23 RX ORDER — CYANOCOBALAMIN 1000 UG/ML
1000 INJECTION, SOLUTION INTRAMUSCULAR; SUBCUTANEOUS ONCE
Status: COMPLETED | OUTPATIENT
Start: 2024-04-23 | End: 2024-04-23

## 2024-04-23 RX ADMIN — CYANOCOBALAMIN 1000 MCG: 1000 INJECTION, SOLUTION INTRAMUSCULAR at 16:28

## 2024-04-27 DIAGNOSIS — I10 BENIGN ESSENTIAL HTN: ICD-10-CM

## 2024-04-27 DIAGNOSIS — E78.2 MIXED HYPERLIPIDEMIA: ICD-10-CM

## 2024-04-28 RX ORDER — SIMVASTATIN 10 MG
TABLET ORAL
Qty: 90 TABLET | Refills: 1 | Status: SHIPPED | OUTPATIENT
Start: 2024-04-28

## 2024-04-30 DIAGNOSIS — K44.9 HIATAL HERNIA: ICD-10-CM

## 2024-04-30 DIAGNOSIS — K29.60 EROSIVE GASTRITIS: ICD-10-CM

## 2024-05-02 RX ORDER — OMEPRAZOLE 40 MG/1
40 CAPSULE, DELAYED RELEASE ORAL DAILY
Qty: 90 CAPSULE | Refills: 1 | Status: SHIPPED | OUTPATIENT
Start: 2024-05-02

## 2024-06-06 DIAGNOSIS — E03.9 ACQUIRED HYPOTHYROIDISM: ICD-10-CM

## 2024-06-06 RX ORDER — LEVOTHYROXINE SODIUM 0.05 MG/1
50 TABLET ORAL
Qty: 30 TABLET | Refills: 5 | Status: SHIPPED | OUTPATIENT
Start: 2024-06-06

## 2024-06-18 ENCOUNTER — HOSPITAL ENCOUNTER (OUTPATIENT)
Dept: INFUSION CENTER | Facility: CLINIC | Age: 79
End: 2024-06-18

## 2024-06-19 ENCOUNTER — HOSPITAL ENCOUNTER (OUTPATIENT)
Dept: INFUSION CENTER | Facility: CLINIC | Age: 79
Discharge: HOME | End: 2024-06-19
Payer: COMMERCIAL

## 2024-06-19 DIAGNOSIS — E53.8 LOW FOLATE: Primary | ICD-10-CM

## 2024-06-19 PROBLEM — I12.9 HYPERTENSIVE KIDNEY DISEASE: Status: ACTIVE | Noted: 2024-06-19

## 2024-06-19 RX ORDER — CYANOCOBALAMIN 1000 UG/ML
1000 INJECTION, SOLUTION INTRAMUSCULAR; SUBCUTANEOUS ONCE
OUTPATIENT
Start: 2024-08-13

## 2024-06-19 RX ORDER — CYANOCOBALAMIN 1000 UG/ML
1000 INJECTION, SOLUTION INTRAMUSCULAR; SUBCUTANEOUS ONCE
Status: COMPLETED | OUTPATIENT
Start: 2024-06-19 | End: 2024-06-19

## 2024-06-19 RX ADMIN — CYANOCOBALAMIN 1000 MCG: 1000 INJECTION INTRAMUSCULAR; SUBCUTANEOUS at 16:32

## 2024-06-19 NOTE — PROGRESS NOTES
Pt here for B12 injection. Received injection in L arm and tolerated well. No complaints at this time. Next appt confirmed fpr 8/14/24 at 1630. Declined AVS.

## 2024-08-14 ENCOUNTER — HOSPITAL ENCOUNTER (OUTPATIENT)
Dept: INFUSION CENTER | Facility: CLINIC | Age: 79
Discharge: HOME | End: 2024-08-14
Payer: COMMERCIAL

## 2024-08-14 ENCOUNTER — RA CDI HCC (OUTPATIENT)
Dept: OTHER | Facility: HOSPITAL | Age: 79
End: 2024-08-14

## 2024-08-14 DIAGNOSIS — E53.8 LOW FOLATE: Primary | ICD-10-CM

## 2024-08-14 PROCEDURE — 96372 THER/PROPH/DIAG INJ SC/IM: CPT

## 2024-08-14 RX ORDER — CYANOCOBALAMIN 1000 UG/ML
1000 INJECTION, SOLUTION INTRAMUSCULAR; SUBCUTANEOUS ONCE
Status: COMPLETED | OUTPATIENT
Start: 2024-08-14 | End: 2024-08-14

## 2024-08-14 RX ORDER — CYANOCOBALAMIN 1000 UG/ML
1000 INJECTION, SOLUTION INTRAMUSCULAR; SUBCUTANEOUS ONCE
OUTPATIENT
Start: 2024-10-09

## 2024-08-14 RX ADMIN — CYANOCOBALAMIN 1000 MCG: 1000 INJECTION, SOLUTION INTRAMUSCULAR at 16:23

## 2024-08-14 NOTE — PROGRESS NOTES
Pt here for B12 injection, given in L arm. Tolerated tx well with no complaints at this time. Next appt confirmed for 10/9 at 1630 at [facility]. Declined AVS.

## 2024-08-22 ENCOUNTER — OFFICE VISIT (OUTPATIENT)
Dept: FAMILY MEDICINE CLINIC | Facility: CLINIC | Age: 79
End: 2024-08-22
Payer: COMMERCIAL

## 2024-08-22 VITALS
TEMPERATURE: 97.2 F | BODY MASS INDEX: 37.65 KG/M2 | HEART RATE: 87 BPM | DIASTOLIC BLOOD PRESSURE: 70 MMHG | HEIGHT: 62 IN | WEIGHT: 204.6 LBS | SYSTOLIC BLOOD PRESSURE: 118 MMHG | OXYGEN SATURATION: 96 %

## 2024-08-22 DIAGNOSIS — F03.90 DEMENTIA WITHOUT BEHAVIORAL DISTURBANCE (HCC): ICD-10-CM

## 2024-08-22 DIAGNOSIS — I12.9 HYPERTENSIVE KIDNEY DISEASE: ICD-10-CM

## 2024-08-22 DIAGNOSIS — E03.9 ACQUIRED HYPOTHYROIDISM: Primary | ICD-10-CM

## 2024-08-22 DIAGNOSIS — Z00.00 MEDICARE ANNUAL WELLNESS VISIT, SUBSEQUENT: ICD-10-CM

## 2024-08-22 DIAGNOSIS — I10 BENIGN ESSENTIAL HTN: ICD-10-CM

## 2024-08-22 DIAGNOSIS — D50.8 IRON DEFICIENCY ANEMIA SECONDARY TO INADEQUATE DIETARY IRON INTAKE: ICD-10-CM

## 2024-08-22 PROCEDURE — 99213 OFFICE O/P EST LOW 20 MIN: CPT | Performed by: FAMILY MEDICINE

## 2024-08-22 PROCEDURE — G0439 PPPS, SUBSEQ VISIT: HCPCS | Performed by: FAMILY MEDICINE

## 2024-08-22 RX ORDER — LEVOTHYROXINE SODIUM 50 UG/1
50 TABLET ORAL
Qty: 90 TABLET | Refills: 1 | Status: SHIPPED | OUTPATIENT
Start: 2024-08-22

## 2024-08-22 NOTE — PROGRESS NOTES
Ambulatory Visit  Name: Tanya Stevens      : 1945      MRN: [id number]  Encounter Provider: Alicia Linton MD  Encounter Date: 2024   Encounter department: [facility]    Assessment & Plan   1. Acquired hypothyroidism  -     levothyroxine 50 mcg tablet; Take 1 tablet (50 mcg total) by mouth daily in the early morning  -     CBC and differential; Future  -     Comprehensive metabolic panel; Future  -     Lipid panel; Future  -     TSH, 3rd generation; Future  2. Dementia without behavioral disturbance (HCC)  -     CBC and differential; Future  -     Comprehensive metabolic panel; Future  -     Lipid panel; Future  -     TSH, 3rd generation; Future  3. Benign essential HTN  4. Hypertensive kidney disease  5. Iron deficiency anemia secondary to inadequate dietary iron intake  6. Medicare annual wellness visit, subsequent      Depression Screening and Follow-up Plan: Patient was screened for depression during today's encounter. They screened negative with a PHQ-2 score of 0.    Urinary Incontinence Plan of Care: counseling topics discussed: use restroom every 2 hours and limiting fluid intake 3-4 hours before bed.       Preventive health issues were discussed with patient, and age appropriate screening tests were ordered as noted in patient's After Visit Summary. Personalized health advice and appropriate referrals for health education or preventive services given if needed, as noted in patient's After Visit Summary.    History of Present Illness     Here for f/u. BP controlled. Follows with heme/onc at [facility], has labs due in a few months.        Patient Care Team:  Alicia Linotn MD as PCP - General (Family Medicine)  MD Brianna Gonzalez PA-C    Review of Systems   Constitutional:  Negative for fatigue and fever.   HENT:  Negative for congestion.    Eyes:  Negative for visual disturbance.   Respiratory:  Negative for chest tightness and shortness of breath.     Cardiovascular:  Negative for chest pain and palpitations.   Gastrointestinal:  Negative for abdominal pain.   Genitourinary:  Negative for difficulty urinating.   Musculoskeletal:  Negative for arthralgias.   Neurological:  Negative for headaches.   Hematological:  Does not bruise/bleed easily.   Psychiatric/Behavioral:  Positive for confusion.      Medical History Reviewed by provider this encounter:       Annual Wellness Visit Questionnaire   Tanya is here for her Subsequent Wellness visit. Last Medicare Wellness visit information reviewed, patient interviewed and updates made to the record today.      Historian  Patient cannot answer questions due to cognitive impairment, intelluctual disability, or expressive limitations. Information provided by: caregiver.    Health Risk Assessment:   Patient rates overall health as poor. Patient feels that their physical health rating is much worse. Patient is very dissatisfied with their life. Eyesight was rated as slightly worse. Hearing was rated as much worse. Patient feels that their emotional and mental health rating is much worse. Patients states they are always angry. Patient states they are always unusually tired/fatigued. Pain experienced in the last 7 days has been a lot. Patient's pain rating has been 10/10. Patient states that she has experienced weight loss or gain in last 6 months.     Depression Screening:   PHQ-2 Score: 0      Fall Risk Screening:   In the past year, patient has experienced: no history of falling in past year      Urinary Incontinence Screening:   Patient has leaked urine accidently in the last six months.     Home Safety:  Patient has trouble with stairs inside or outside of their home. Patient has working smoke alarms and has working carbon monoxide detector. Home safety hazards include: none.     Nutrition:   Current diet is Frequent junk food. More picky with eating    Medications:   Patient is not currently taking any  over-the-counter supplements. Patient is not able to manage medications.     Activities of Daily Living (ADLs)/Instrumental Activities of Daily Living (IADLs):   Walk and transfer into and out of bed and chair?: No  Dress and groom yourself?: No    Bathe or shower yourself?: No    Feed yourself? No  Do your laundry/housekeeping?: No  Manage your money, pay your bills and track your expenses?: No  Make your own meals?: No    Do your own shopping?: No    Previous Hospitalizations:   Any hospitalizations or ED visits within the last 12 months?: No      Advance Care Planning:   Living will: Yes    Durable POA for healthcare: No    Advanced directive: Yes    Advanced directive counseling given: Yes    ACP document given: Yes      Cognitive Screening:   Provider or family/friend/caregiver concerned regarding cognition?: Yes    Cognition Comments: Has been stable    PREVENTIVE SCREENINGS      Cardiovascular Screening:    General: Screening Not Indicated and History Lipid Disorder      Diabetes Screening:     General: Screening Current      Colorectal Cancer Screening:     General: Screening Not Indicated      Breast Cancer Screening:     General: Screening Not Indicated      Cervical Cancer Screening:    General: Screening Not Indicated      Osteoporosis Screening:    General: Patient Declines      Abdominal Aortic Aneurysm (AAA) Screening:        General: Screening Not Indicated      Lung Cancer Screening:     General: Screening Not Indicated      Hepatitis C Screening:    General: Screening Not Indicated    Screening, Brief Intervention, and Referral to Treatment (SBIRT)    Screening  Typical number of drinks in a day: 0  Typical number of drinks in a week: 0  Interpretation: Low risk drinking behavior.    AUDIT-C Screenin) How often did you have a drink containing alcohol in the past year? never  2) How many drinks did you have on a typical day when you were drinking in the past year? 0  3) How often did you have 6  "or more drinks on one occasion in the past year? never    AUDIT-C Score: 0  Interpretation: Score 0-2 (female): Negative screen for alcohol misuse    Single Item Drug Screening:  How often have you used an illegal drug (including marijuana) or a prescription medication for non-medical reasons in the past year? never    Single Item Drug Screen Score: 0  Interpretation: Negative screen for possible drug use disorder    Social Determinants of Health     Food Insecurity: Patient Declined (8/16/2024)    Hunger Vital Sign     Worried About Running Out of Food in the Last Year: Patient declined     Ran Out of Food in the Last Year: Patient declined   Transportation Needs: Patient Declined (8/16/2024)    PRAPARE - Transportation     Lack of Transportation (Medical): Patient declined     Lack of Transportation (Non-Medical): Patient declined   Housing Stability: Patient Declined (8/22/2024)    Housing Stability Vital Sign     Unable to Pay for Housing in the Last Year: Patient declined     Number of Times Moved in the Last Year: 0     Homeless in the Last Year: Patient declined   Utilities: Patient Declined (8/16/2024)    [facility] Utilities     Threatened with loss of utilities: Patient declined     No results found.    Objective     /70 (BP Location: Left arm, Patient Position: Sitting, Cuff Size: Large)   Pulse 87   Temp (!) 97.2 °F (36.2 °C) (Temporal)   Ht 5' 2\" (1.575 m)   Wt 92.8 kg (204 lb 9.6 oz)   SpO2 96%   BMI 37.42 kg/m²     Physical Exam  Vitals reviewed.   Constitutional:       Appearance: Normal appearance.   Cardiovascular:      Rate and Rhythm: Normal rate and regular rhythm.      Heart sounds: Normal heart sounds.   Pulmonary:      Effort: Pulmonary effort is normal.      Breath sounds: Normal breath sounds.   Musculoskeletal:      Right lower leg: No edema.      Left lower leg: No edema.   Skin:     Capillary Refill: Capillary refill takes less than 2 seconds.   Neurological:      General: No focal " deficit present.      Mental Status: She is alert and oriented to person, place, and time.   Psychiatric:         Mood and Affect: Mood normal.         Behavior: Behavior normal.         Thought Content: Thought content normal.         Judgment: Judgment normal.

## 2024-08-22 NOTE — PATIENT INSTRUCTIONS
Medicare Preventive Visit Patient Instructions  Thank you for completing your Welcome to Medicare Visit or Medicare Annual Wellness Visit today. Your next wellness visit will be due in one year (8/23/2025).  The screening/preventive services that you may require over the next 5-10 years are detailed below. Some tests may not apply to you based off risk factors and/or age. Screening tests ordered at today's visit but not completed yet may show as past due. Also, please note that scanned in results may not display below.  Preventive Screenings:  Service Recommendations Previous Testing/Comments   Colorectal Cancer Screening  * Colonoscopy    * Fecal Occult Blood Test (FOBT)/Fecal Immunochemical Test (FIT)  * Fecal DNA/Cologuard Test  * Flexible Sigmoidoscopy Age: 45-75 years old   Colonoscopy: every 10 years (may be performed more frequently if at higher risk)  OR  FOBT/FIT: every 1 year  OR  Cologuard: every 3 years  OR  Sigmoidoscopy: every 5 years  Screening may be recommended earlier than age 45 if at higher risk for colorectal cancer. Also, an individualized decision between you and your healthcare provider will decide whether screening between the ages of 76-85 would be appropriate. Colonoscopy: 09/09/2010  FOBT/FIT: Not on file  Cologuard: Not on file  Sigmoidoscopy: Not on file          Breast Cancer Screening Age: 40+ years old  Frequency: every 1-2 years  Not required if history of left and right mastectomy Mammogram: 01/14/2020        Cervical Cancer Screening Between the ages of 21-29, pap smear recommended once every 3 years.   Between the ages of 30-65, can perform pap smear with HPV co-testing every 5 years.   Recommendations may differ for women with a history of total hysterectomy, cervical cancer, or abnormal pap smears in past. Pap Smear: Not on file    Screening Not Indicated   Hepatitis C Screening Once for adults born between 1945 and 1965  More frequently in patients at high risk for Hepatitis C  Hep C Antibody: Not on file        Diabetes Screening 1-2 times per year if you're at risk for diabetes or have pre-diabetes Fasting glucose: 116 mg/dL (12/6/2023)  A1C: No results in last 5 years (No results in last 5 years)  Screening Current   Cholesterol Screening Once every 5 years if you don't have a lipid disorder. May order more often based on risk factors. Lipid panel: 11/09/2022    Screening Not Indicated  History Lipid Disorder     Other Preventive Screenings Covered by Medicare:  Abdominal Aortic Aneurysm (AAA) Screening: covered once if your at risk. You're considered to be at risk if you have a family history of AAA.  Lung Cancer Screening: covers low dose CT scan once per year if you meet all of the following conditions: (1) Age 55-77; (2) No signs or symptoms of lung cancer; (3) Current smoker or have quit smoking within the last 15 years; (4) You have a tobacco smoking history of at least 20 pack years (packs per day multiplied by number of years you smoked); (5) You get a written order from a healthcare provider.  Glaucoma Screening: covered annually if you're considered high risk: (1) You have diabetes OR (2) Family history of glaucoma OR (3)  aged 50 and older OR (4)  American aged 65 and older  Osteoporosis Screening: covered every 2 years if you meet one of the following conditions: (1) You're estrogen deficient and at risk for osteoporosis based off medical history and other findings; (2) Have a vertebral abnormality; (3) On glucocorticoid therapy for more than 3 months; (4) Have primary hyperparathyroidism; (5) On osteoporosis medications and need to assess response to drug therapy.   Last bone density test (DXA Scan): Not on file.  HIV Screening: covered annually if you're between the age of 15-65. Also covered annually if you are younger than 15 and older than 65 with risk factors for HIV infection. For pregnant patients, it is covered up to 3 times per  pregnancy.    Immunizations:  Immunization Recommendations   Influenza Vaccine Annual influenza vaccination during flu season is recommended for all persons aged >= 6 months who do not have contraindications   Pneumococcal Vaccine   * Pneumococcal conjugate vaccine = PCV13 (Prevnar 13), PCV15 (Vaxneuvance), PCV20 (Prevnar 20)  * Pneumococcal polysaccharide vaccine = PPSV23 (Pneumovax) Adults 19-65 yo with certain risk factors or if 65+ yo  If never received any pneumonia vaccine: recommend Prevnar 20 (PCV20)  Give PCV20 if previously received 1 dose of PCV13 or PPSV23   Hepatitis B Vaccine 3 dose series if at intermediate or high risk (ex: diabetes, end stage renal disease, liver disease)   Respiratory syncytial virus (RSV) Vaccine - COVERED BY MEDICARE PART D  * RSVPreF3 (Arexvy) CDC recommends that adults 60 years of age and older may receive a single dose of RSV vaccine using shared clinical decision-making (SCDM)   Tetanus (Td) Vaccine - COST NOT COVERED BY MEDICARE PART B Following completion of primary series, a booster dose should be given every 10 years to maintain immunity against tetanus. Td may also be given as tetanus wound prophylaxis.   Tdap Vaccine - COST NOT COVERED BY MEDICARE PART B Recommended at least once for all adults. For pregnant patients, recommended with each pregnancy.   Shingles Vaccine (Shingrix) - COST NOT COVERED BY MEDICARE PART B  2 shot series recommended in those 19 years and older who have or will have weakened immune systems or those 50 years and older     Health Maintenance Due:      Topic Date Due   • DXA SCAN  Never done     Immunizations Due:      Topic Date Due   • COVID-19 Vaccine (3 - 2023-24 season) 09/01/2023   • Influenza Vaccine (1) 09/01/2024     Advance Directives   What are advance directives?  Advance directives are legal documents that state your wishes and plans for medical care. These plans are made ahead of time in case you lose your ability to make decisions  for yourself. Advance directives can apply to any medical decision, such as the treatments you want, and if you want to donate organs.   What are the types of advance directives?  There are many types of advance directives, and each state has rules about how to use them. You may choose a combination of any of the following:  Living will:  This is a written record of the treatment you want. You can also choose which treatments you do not want, which to limit, and which to stop at a certain time. This includes surgery, medicine, IV fluid, and tube feedings.   Durable power of  for healthcare (DPAHC):  This is a written record that states who you want to make healthcare choices for you when you are unable to make them for yourself. This person, called a proxy, is usually a family member or a friend. You may choose more than 1 proxy.  Do not resuscitate (DNR) order:  A DNR order is used in case your heart stops beating or you stop breathing. It is a request not to have certain forms of treatment, such as CPR. A DNR order may be included in other types of advance directives.  Medical directive:  This covers the care that you want if you are in a coma, near death, or unable to make decisions for yourself. You can list the treatments you want for each condition. Treatment may include pain medicine, surgery, blood transfusions, dialysis, IV or tube feedings, and a ventilator (breathing machine).  Values history:  This document has questions about your views, beliefs, and how you feel and think about life. This information can help others choose the care that you would choose.  Why are advance directives important?  An advance directive helps you control your care. Although spoken wishes may be used, it is better to have your wishes written down. Spoken wishes can be misunderstood, or not followed. Treatments may be given even if you do not want them. An advance directive may make it easier for your family to make  difficult choices about your care.   Urinary Incontinence   Urinary incontinence (UI)  is when you lose control of your bladder. UI develops because your bladder cannot store or empty urine properly. The 3 most common types of UI are stress incontinence, urge incontinence, or both.  Medicines:   May be given to help strengthen your bladder control. Report any side effects of medication to your healthcare provider.  Do pelvic muscle exercises often:  Your pelvic muscles help you stop urinating. Squeeze these muscles tight for 5 seconds, then relax for 5 seconds. Gradually work up to squeezing for 10 seconds. Do 3 sets of 15 repetitions a day, or as directed. This will help strengthen your pelvic muscles and improve bladder control.  Train your bladder:  Go to the bathroom at set times, such as every 2 hours, even if you do not feel the urge to go. You can also try to hold your urine when you feel the urge to go. For example, hold your urine for 5 minutes when you feel the urge to go. As that becomes easier, hold your urine for 10 minutes.   Self-care:   Keep a UI record.  Write down how often you leak urine and how much you leak. Make a note of what you were doing when you leaked urine.  Drink liquids as directed. You may need to limit the amount of liquid you drink to help control your urine leakage. Do not drink any liquid right before you go to bed. Limit or do not have drinks that contain caffeine or alcohol.   Prevent constipation.  Eat a variety of high-fiber foods. Good examples are high-fiber cereals, beans, vegetables, and whole-grain breads. Walking is the best way to trigger your intestines to have a bowel movement.  Exercise regularly and maintain a healthy weight.  Weight loss and exercise will decrease pressure on your bladder and help you control your leakage.   Use a catheter as directed  to help empty your bladder. A catheter is a tiny, plastic tube that is put into your bladder to drain your urine.    Go to behavior therapy as directed.  Behavior therapy may be used to help you learn to control your urge to urinate.    Weight Management   Why it is important to manage your weight:  Being overweight increases your risk of health conditions such as heart disease, high blood pressure, type 2 diabetes, and certain types of cancer. It can also increase your risk for osteoarthritis, sleep apnea, and other respiratory problems. Aim for a slow, steady weight loss. Even a small amount of weight loss can lower your risk of health problems.  How to lose weight safely:  A safe and healthy way to lose weight is to eat fewer calories and get regular exercise. You can lose up about 1 pound a week by decreasing the number of calories you eat by 500 calories each day.   Healthy meal plan for weight management:  A healthy meal plan includes a variety of foods, contains fewer calories, and helps you stay healthy. A healthy meal plan includes the following:  Eat whole-grain foods more often.  A healthy meal plan should contain fiber. Fiber is the part of grains, fruits, and vegetables that is not broken down by your body. Whole-grain foods are healthy and provide extra fiber in your diet. Some examples of whole-grain foods are whole-wheat breads and pastas, oatmeal, brown rice, and bulgur.  Eat a variety of vegetables every day.  Include dark, leafy greens such as spinach, kale, lalita greens, and mustard greens. Eat yellow and orange vegetables such as carrots, sweet potatoes, and winter squash.   Eat a variety of fruits every day.  Choose fresh or canned fruit (canned in its own juice or light syrup) instead of juice. Fruit juice has very little or no fiber.  Eat low-fat dairy foods.  Drink fat-free (skim) milk or 1% milk. Eat fat-free yogurt and low-fat cottage cheese. Try low-fat cheeses such as mozzarella and other reduced-fat cheeses.  Choose meat and other protein foods that are low in fat.  Choose beans or other legumes  such as split peas or lentils. Choose fish, skinless poultry (chicken or turkey), or lean cuts of red meat (beef or pork). Before you cook meat or poultry, cut off any visible fat.   Use less fat and oil.  Try baking foods instead of frying them. Add less fat, such as margarine, sour cream, regular salad dressing and mayonnaise to foods. Eat fewer high-fat foods. Some examples of high-fat foods include french fries, doughnuts, ice cream, and cakes.  Eat fewer sweets.  Limit foods and drinks that are high in sugar. This includes candy, cookies, regular soda, and sweetened drinks.  Exercise:  Exercise at least 30 minutes per day on most days of the week. Some examples of exercise include walking, biking, dancing, and swimming. You can also fit in more physical activity by taking the stairs instead of the elevator or parking farther away from stores. Ask your healthcare provider about the best exercise plan for you.      © Copyright [employer] 2018 Information is for End User's use only and may not be sold, redistributed or otherwise used for commercial purposes. All illustrations and images included in CareNotes® are the copyrighted property of [employer] or [employer]

## 2024-08-23 ENCOUNTER — TELEPHONE (OUTPATIENT)
Dept: HEMATOLOGY ONCOLOGY | Facility: CLINIC | Age: 79
End: 2024-08-23

## 2024-08-23 NOTE — TELEPHONE ENCOUNTER
Spoke with patient spouse, he is aware for patient to continue B12 treatment she must be seen by one of our providers. He was agreeable to reschedule.     Please reschedule and notify spouse.

## 2024-08-23 NOTE — TELEPHONE ENCOUNTER
Called pt and her  to rs her virtual visit on 10/15. Per pt and her , they do not want to rs the appt. Said she is fine and they are not looking to rs any sort of office visit. Let them know I will inform Jina's team.

## 2024-09-24 ENCOUNTER — OFFICE VISIT (OUTPATIENT)
Dept: FAMILY MEDICINE CLINIC | Facility: CLINIC | Age: 79
End: 2024-09-24
Payer: COMMERCIAL

## 2024-09-24 VITALS
WEIGHT: 206.6 LBS | HEIGHT: 62 IN | OXYGEN SATURATION: 90 % | HEART RATE: 93 BPM | TEMPERATURE: 97.7 F | DIASTOLIC BLOOD PRESSURE: 76 MMHG | SYSTOLIC BLOOD PRESSURE: 136 MMHG | BODY MASS INDEX: 38.02 KG/M2

## 2024-09-24 DIAGNOSIS — R13.10 SWALLOWING DISORDER: Primary | ICD-10-CM

## 2024-09-24 DIAGNOSIS — F03.90 DEMENTIA WITHOUT BEHAVIORAL DISTURBANCE (HCC): ICD-10-CM

## 2024-09-24 PROCEDURE — 99213 OFFICE O/P EST LOW 20 MIN: CPT | Performed by: INTERNAL MEDICINE

## 2024-09-24 PROCEDURE — G2211 COMPLEX E/M VISIT ADD ON: HCPCS | Performed by: INTERNAL MEDICINE

## 2024-09-24 NOTE — PROGRESS NOTES
Ambulatory Visit  Name: Tanya Stevens      : 1945      MRN: [id number]  Encounter Provider: Pepper Puckett MD  Encounter Date: 2024   Encounter department: [facility]    Assessment & Plan  Swallowing disorder  states her his wife does not show Russ she is eating Gerd to take that to eat something as simple as a sandwich to take her more than an hour or two to finish. Herself does not complain of anything. Has seen G.I. in the past and has a hiatal hernia vitamin deficiencies that are being corrected  Orders:    Ambulatory Referral to Senior Care; Future    Dementia without behavioral disturbance (HCC)  patient is had dementia for a while and is slowly progressing. She still is cooperative even interact with her friends if it's not for an extended period of time. Fortunately I do not think he is prepared for what is to come  Orders:    Ambulatory Referral to Senior Care; Future       History of Present Illness     Patient has carry a diagnosis of dementia for quite some time slowly progressing. Not see neuro- but she is on vitamin supplements from some deficiencies. Disease slowly progresses          Review of Systems   Constitutional:  Negative for chills, fatigue, fever and unexpected weight change.   HENT:  Negative for congestion, ear pain, hearing loss, postnasal drip, sinus pressure, sore throat, trouble swallowing and voice change.    Eyes:  Negative for visual disturbance.   Respiratory:  Negative for cough, chest tightness, shortness of breath and wheezing.    Cardiovascular:  Negative for chest pain, palpitations and leg swelling.   Gastrointestinal:  Negative for abdominal distention, abdominal pain, anal bleeding, blood in stool, constipation, diarrhea and nausea.        Swallowing difficulties   Endocrine: Negative for cold intolerance, polydipsia, polyphagia and polyuria.   Genitourinary:  Negative for dysuria, flank pain, frequency, hematuria and urgency.  "  Musculoskeletal:  Negative for arthralgias, back pain, gait problem, joint swelling, myalgias and neck pain.   Skin:  Negative for rash.   Allergic/Immunologic: Negative for immunocompromised state.   Neurological:  Negative for dizziness, syncope, facial asymmetry, weakness, light-headedness, numbness and headaches.   Hematological:  Negative for adenopathy.   Psychiatric/Behavioral:  Positive for confusion, decreased concentration, hallucinations and self-injury. Negative for sleep disturbance and suicidal ideas. The patient is nervous/anxious.            Objective     /76 (BP Location: Left arm, Patient Position: Sitting, Cuff Size: Large)   Pulse 93   Temp 97.7 °F (36.5 °C) (Temporal)   Ht 5' 2\" (1.575 m)   Wt 93.7 kg (206 lb 9.6 oz)   SpO2 90%   BMI 37.79 kg/m²     Physical Exam  Vitals and nursing note reviewed.   Constitutional:       General: She is not in acute distress.     Appearance: She is well-developed. She is obese.   HENT:      Head: Normocephalic and atraumatic.      Right Ear: Tympanic membrane normal.      Left Ear: Tympanic membrane normal.      Nose: No rhinorrhea.      Mouth/Throat:      Mouth: Mucous membranes are moist.   Eyes:      Conjunctiva/sclera: Conjunctivae normal.   Cardiovascular:      Rate and Rhythm: Normal rate and regular rhythm.      Heart sounds: No murmur heard.  Pulmonary:      Effort: Pulmonary effort is normal. No respiratory distress.      Breath sounds: Normal breath sounds.   Abdominal:      Palpations: Abdomen is soft.      Tenderness: There is no abdominal tenderness.   Musculoskeletal:         General: No swelling.      Cervical back: Neck supple.   Skin:     General: Skin is warm and dry.      Capillary Refill: Capillary refill takes less than 2 seconds.   Neurological:      General: No focal deficit present.      Mental Status: She is alert.      Cranial Nerves: No cranial nerve deficit.      Motor: No weakness.      Coordination: Coordination " normal.      Gait: Gait normal.   Psychiatric:         Mood and Affect: Mood normal.

## 2024-09-24 NOTE — ASSESSMENT & PLAN NOTE
states her his wife does not show Russ she is eating Gerd to take that to eat something as simple as a sandwich to take her more than an hour or two to finish. Herself does not complain of anything. Has seen GEROS in the past and has a hiatal hernia vitamin deficiencies that are being corrected  Orders:    Ambulatory Referral to Senior Care; Future

## 2024-09-24 NOTE — ASSESSMENT & PLAN NOTE
patient is had dementia for a while and is slowly progressing. She still is cooperative even interact with her friends if it's not for an extended period of time. Fortunately I do not think he is prepared for what is to come  Orders:    Ambulatory Referral to Senior Care; Future

## 2024-09-27 ENCOUNTER — HOSPITAL ENCOUNTER (INPATIENT)
Facility: HOSPITAL | Age: 79
LOS: 6 days | Discharge: HOME HEALTH | DRG: 315 | End: 2024-10-03
Attending: INTERNAL MEDICINE | Admitting: FAMILY MEDICINE
Payer: COMMERCIAL

## 2024-09-27 ENCOUNTER — APPOINTMENT (EMERGENCY)
Dept: RADIOLOGY | Facility: HOSPITAL | Age: 79
End: 2024-09-27
Payer: COMMERCIAL

## 2024-09-27 ENCOUNTER — APPOINTMENT (EMERGENCY)
Dept: NON INVASIVE DIAGNOSTICS | Facility: HOSPITAL | Age: 79
End: 2024-09-27
Payer: COMMERCIAL

## 2024-09-27 ENCOUNTER — HOSPITAL ENCOUNTER (EMERGENCY)
Facility: HOSPITAL | Age: 79
End: 2024-09-27
Attending: STUDENT IN AN ORGANIZED HEALTH CARE EDUCATION/TRAINING PROGRAM
Payer: COMMERCIAL

## 2024-09-27 ENCOUNTER — APPOINTMENT (EMERGENCY)
Dept: CT IMAGING | Facility: HOSPITAL | Age: 79
End: 2024-09-27
Payer: COMMERCIAL

## 2024-09-27 VITALS
RESPIRATION RATE: 20 BRPM | WEIGHT: 209 LBS | TEMPERATURE: 98.2 F | HEART RATE: 94 BPM | DIASTOLIC BLOOD PRESSURE: 72 MMHG | SYSTOLIC BLOOD PRESSURE: 145 MMHG | HEIGHT: 62 IN | OXYGEN SATURATION: 94 % | BODY MASS INDEX: 38.46 KG/M2

## 2024-09-27 DIAGNOSIS — I31.39 PERICARDIAL EFFUSION: ICD-10-CM

## 2024-09-27 DIAGNOSIS — J96.01 ACUTE HYPOXIC RESPIRATORY FAILURE (HCC): Primary | ICD-10-CM

## 2024-09-27 DIAGNOSIS — I31.39 PERICARDIAL EFFUSION: Primary | ICD-10-CM

## 2024-09-27 DIAGNOSIS — R06.02 SOB (SHORTNESS OF BREATH): ICD-10-CM

## 2024-09-27 DIAGNOSIS — J90 PLEURAL EFFUSION: ICD-10-CM

## 2024-09-27 LAB
4HR DELTA HS TROPONIN: >1 NG/L
ABO GROUP BLD: NORMAL
ABO GROUP BLD: NORMAL
ANION GAP SERPL CALCULATED.3IONS-SCNC: 10 MMOL/L (ref 4–13)
AORTIC ROOT: 3.1 CM
AORTIC VALVE MEAN VELOCITY: 13.6 M/S
APICAL FOUR CHAMBER EJECTION FRACTION: 66 %
APTT PPP: 34 SECONDS (ref 23–34)
ASCENDING AORTA: 3.8 CM
AV LVOT MEAN GRADIENT: 4 MMHG
AV LVOT PEAK GRADIENT: 8 MMHG
AV MEAN GRADIENT: 8 MMHG
AV PEAK GRADIENT: 17 MMHG
AV VELOCITY RATIO: 0.68
BASE EX.OXY STD BLDV CALC-SCNC: 93.7 % (ref 60–80)
BASE EXCESS BLDV CALC-SCNC: 0.4 MMOL/L
BASOPHILS # BLD AUTO: 0.02 THOUSANDS/ΜL (ref 0–0.1)
BASOPHILS NFR BLD AUTO: 0 % (ref 0–1)
BLD GP AB SCN SERPL QL: NEGATIVE
BNP SERPL-MCNC: 152 PG/ML (ref 0–100)
BSA FOR ECHO PROCEDURE: 1.95 M2
BUN SERPL-MCNC: 19 MG/DL (ref 5–25)
CALCIUM SERPL-MCNC: 9.1 MG/DL (ref 8.4–10.2)
CARDIAC TROPONIN I PNL SERPL HS: 3 NG/L
CARDIAC TROPONIN I PNL SERPL HS: <2 NG/L
CARDIAC TROPONIN I PNL SERPL HS: <2 NG/L
CHLORIDE SERPL-SCNC: 100 MMOL/L (ref 96–108)
CO2 SERPL-SCNC: 23 MMOL/L (ref 21–32)
CREAT SERPL-MCNC: 1.03 MG/DL (ref 0.6–1.3)
D DIMER PPP FEU-MCNC: 4.7 UG/ML FEU
DOP CALC AO PEAK VEL: 2.07 M/S
DOP CALC AO VTI: 33.63 CM
DOP CALC LVOT PEAK VEL VTI: 27.11 CM
DOP CALC LVOT PEAK VEL: 1.4 M/S
E WAVE DECELERATION TIME: 230 MS
E/A RATIO: 0.68
EOSINOPHIL # BLD AUTO: 0.01 THOUSAND/ΜL (ref 0–0.61)
EOSINOPHIL NFR BLD AUTO: 0 % (ref 0–6)
ERYTHROCYTE [DISTWIDTH] IN BLOOD BY AUTOMATED COUNT: 13.2 % (ref 11.6–15.1)
FLUAV AG UPPER RESP QL IA.RAPID: NEGATIVE
FLUBV AG UPPER RESP QL IA.RAPID: NEGATIVE
FRACTIONAL SHORTENING: 27 (ref 28–44)
GFR SERPL CREATININE-BSD FRML MDRD: 51 ML/MIN/1.73SQ M
GLUCOSE SERPL-MCNC: 133 MG/DL (ref 65–140)
HCO3 BLDV-SCNC: 20.5 MMOL/L (ref 24–30)
HCT VFR BLD AUTO: 39.4 % (ref 34.8–46.1)
HGB BLD-MCNC: 13.1 G/DL (ref 11.5–15.4)
IMM GRANULOCYTES # BLD AUTO: 0.07 THOUSAND/UL (ref 0–0.2)
IMM GRANULOCYTES NFR BLD AUTO: 1 % (ref 0–2)
INR PPP: 1.17 (ref 0.85–1.19)
INTERVENTRICULAR SEPTUM IN DIASTOLE (PARASTERNAL SHORT AXIS VIEW): 1.1 CM
INTERVENTRICULAR SEPTUM: 1.1 CM (ref 0.6–1.1)
LAAS-AP2: 10.8 CM2
LAAS-AP4: 13.2 CM2
LACTATE SERPL-SCNC: 1.1 MMOL/L (ref 0.5–2)
LEFT ATRIUM AREA SYSTOLE SINGLE PLANE A4C: 12.3 CM2
LEFT ATRIUM SIZE: 3.1 CM
LEFT ATRIUM VOLUME (MOD BIPLANE): 25 ML
LEFT ATRIUM VOLUME INDEX (MOD BIPLANE): 12.8 ML/M2
LEFT INTERNAL DIMENSION IN SYSTOLE: 2.7 CM (ref 2.1–4)
LEFT VENTRICULAR INTERNAL DIMENSION IN DIASTOLE: 3.7 CM (ref 3.5–6)
LEFT VENTRICULAR POSTERIOR WALL IN END DIASTOLE: 1.2 CM
LEFT VENTRICULAR STROKE VOLUME: 33 ML
LVSV (TEICH): 33 ML
LYMPHOCYTES # BLD AUTO: 1.41 THOUSANDS/ΜL (ref 0.6–4.47)
LYMPHOCYTES NFR BLD AUTO: 11 % (ref 14–44)
MCH RBC QN AUTO: 30.2 PG (ref 26.8–34.3)
MCHC RBC AUTO-ENTMCNC: 33.2 G/DL (ref 31.4–37.4)
MCV RBC AUTO: 91 FL (ref 82–98)
MONOCYTES # BLD AUTO: 1.25 THOUSAND/ΜL (ref 0.17–1.22)
MONOCYTES NFR BLD AUTO: 10 % (ref 4–12)
MV E'TISSUE VEL-SEP: 8 CM/S
MV PEAK A VEL: 1.02 M/S
MV PEAK E VEL: 69 CM/S
MV STENOSIS PRESSURE HALF TIME: 67 MS
MV VALVE AREA P 1/2 METHOD: 3.28
NEUTROPHILS # BLD AUTO: 9.87 THOUSANDS/ΜL (ref 1.85–7.62)
NEUTS SEG NFR BLD AUTO: 78 % (ref 43–75)
NRBC BLD AUTO-RTO: 0 /100 WBCS
O2 CT BLDV-SCNC: 17.9 ML/DL
PCO2 BLDV: 22.3 MM HG (ref 42–50)
PH BLDV: 7.58 [PH] (ref 7.3–7.4)
PLATELET # BLD AUTO: 320 THOUSANDS/UL (ref 149–390)
PMV BLD AUTO: 9.7 FL (ref 8.9–12.7)
PO2 BLDV: 170.4 MM HG (ref 35–45)
POTASSIUM SERPL-SCNC: 4.3 MMOL/L (ref 3.5–5.3)
PROTHROMBIN TIME: 15.6 SECONDS (ref 12.3–15)
RBC # BLD AUTO: 4.34 MILLION/UL (ref 3.81–5.12)
RH BLD: POSITIVE
RH BLD: POSITIVE
RIGHT ATRIAL 2D VOLUME: 30 ML
RIGHT ATRIUM AREA SYSTOLE A4C: 13.4 CM2
RIGHT VENTRICLE ID DIMENSION: 2.9 CM
SALICYLATES SERPL-MCNC: <5 MG/DL (ref 3–20)
SARS-COV+SARS-COV-2 AG RESP QL IA.RAPID: NEGATIVE
SL CV ECHO PERICARDIAL EFFUSION SIZE: 1.5 CM
SL CV LEFT ATRIUM LENGTH A2C: 4.9 CM
SL CV LV EF: 65
SL CV PED ECHO LEFT VENTRICLE DIASTOLIC VOLUME (MOD BIPLANE) 2D: 59 ML
SL CV PED ECHO LEFT VENTRICLE SYSTOLIC VOLUME (MOD BIPLANE) 2D: 26 ML
SODIUM SERPL-SCNC: 133 MMOL/L (ref 135–147)
SPECIMEN EXPIRATION DATE: NORMAL
T4 FREE SERPL-MCNC: 1.55 NG/DL (ref 0.61–1.12)
TR MAX PG: 30 MMHG
TR PEAK VELOCITY: 2.7 M/S
TRICUSPID ANNULAR PLANE SYSTOLIC EXCURSION: 2.2 CM
TRICUSPID VALVE PEAK REGURGITATION VELOCITY: 2.72 M/S
TSH SERPL DL<=0.05 MIU/L-ACNC: 4.96 UIU/ML (ref 0.45–4.5)
WBC # BLD AUTO: 12.63 THOUSAND/UL (ref 4.31–10.16)

## 2024-09-27 PROCEDURE — 84439 ASSAY OF FREE THYROXINE: CPT | Performed by: STUDENT IN AN ORGANIZED HEALTH CARE EDUCATION/TRAINING PROGRAM

## 2024-09-27 PROCEDURE — 84484 ASSAY OF TROPONIN QUANT: CPT | Performed by: STUDENT IN AN ORGANIZED HEALTH CARE EDUCATION/TRAINING PROGRAM

## 2024-09-27 PROCEDURE — 85025 COMPLETE CBC W/AUTO DIFF WBC: CPT | Performed by: STUDENT IN AN ORGANIZED HEALTH CARE EDUCATION/TRAINING PROGRAM

## 2024-09-27 PROCEDURE — 83605 ASSAY OF LACTIC ACID: CPT | Performed by: STUDENT IN AN ORGANIZED HEALTH CARE EDUCATION/TRAINING PROGRAM

## 2024-09-27 PROCEDURE — 93005 ELECTROCARDIOGRAM TRACING: CPT

## 2024-09-27 PROCEDURE — 80048 BASIC METABOLIC PNL TOTAL CA: CPT | Performed by: STUDENT IN AN ORGANIZED HEALTH CARE EDUCATION/TRAINING PROGRAM

## 2024-09-27 PROCEDURE — 87804 INFLUENZA ASSAY W/OPTIC: CPT | Performed by: STUDENT IN AN ORGANIZED HEALTH CARE EDUCATION/TRAINING PROGRAM

## 2024-09-27 PROCEDURE — 86900 BLOOD TYPING SEROLOGIC ABO: CPT | Performed by: STUDENT IN AN ORGANIZED HEALTH CARE EDUCATION/TRAINING PROGRAM

## 2024-09-27 PROCEDURE — 99285 EMERGENCY DEPT VISIT HI MDM: CPT | Performed by: STUDENT IN AN ORGANIZED HEALTH CARE EDUCATION/TRAINING PROGRAM

## 2024-09-27 PROCEDURE — 99223 1ST HOSP IP/OBS HIGH 75: CPT | Performed by: FAMILY MEDICINE

## 2024-09-27 PROCEDURE — 82805 BLOOD GASES W/O2 SATURATION: CPT | Performed by: STUDENT IN AN ORGANIZED HEALTH CARE EDUCATION/TRAINING PROGRAM

## 2024-09-27 PROCEDURE — 36415 COLL VENOUS BLD VENIPUNCTURE: CPT | Performed by: STUDENT IN AN ORGANIZED HEALTH CARE EDUCATION/TRAINING PROGRAM

## 2024-09-27 PROCEDURE — 85610 PROTHROMBIN TIME: CPT | Performed by: STUDENT IN AN ORGANIZED HEALTH CARE EDUCATION/TRAINING PROGRAM

## 2024-09-27 PROCEDURE — 86850 RBC ANTIBODY SCREEN: CPT | Performed by: STUDENT IN AN ORGANIZED HEALTH CARE EDUCATION/TRAINING PROGRAM

## 2024-09-27 PROCEDURE — 86901 BLOOD TYPING SEROLOGIC RH(D): CPT | Performed by: STUDENT IN AN ORGANIZED HEALTH CARE EDUCATION/TRAINING PROGRAM

## 2024-09-27 PROCEDURE — 96365 THER/PROPH/DIAG IV INF INIT: CPT

## 2024-09-27 PROCEDURE — 71045 X-RAY EXAM CHEST 1 VIEW: CPT

## 2024-09-27 PROCEDURE — 85379 FIBRIN DEGRADATION QUANT: CPT | Performed by: STUDENT IN AN ORGANIZED HEALTH CARE EDUCATION/TRAINING PROGRAM

## 2024-09-27 PROCEDURE — 71275 CT ANGIOGRAPHY CHEST: CPT

## 2024-09-27 PROCEDURE — 93306 TTE W/DOPPLER COMPLETE: CPT

## 2024-09-27 PROCEDURE — 85730 THROMBOPLASTIN TIME PARTIAL: CPT | Performed by: STUDENT IN AN ORGANIZED HEALTH CARE EDUCATION/TRAINING PROGRAM

## 2024-09-27 PROCEDURE — 80179 DRUG ASSAY SALICYLATE: CPT | Performed by: STUDENT IN AN ORGANIZED HEALTH CARE EDUCATION/TRAINING PROGRAM

## 2024-09-27 PROCEDURE — 99285 EMERGENCY DEPT VISIT HI MDM: CPT

## 2024-09-27 PROCEDURE — 83880 ASSAY OF NATRIURETIC PEPTIDE: CPT | Performed by: STUDENT IN AN ORGANIZED HEALTH CARE EDUCATION/TRAINING PROGRAM

## 2024-09-27 PROCEDURE — 93306 TTE W/DOPPLER COMPLETE: CPT | Performed by: INTERNAL MEDICINE

## 2024-09-27 PROCEDURE — 87811 SARS-COV-2 COVID19 W/OPTIC: CPT | Performed by: STUDENT IN AN ORGANIZED HEALTH CARE EDUCATION/TRAINING PROGRAM

## 2024-09-27 PROCEDURE — 84443 ASSAY THYROID STIM HORMONE: CPT | Performed by: STUDENT IN AN ORGANIZED HEALTH CARE EDUCATION/TRAINING PROGRAM

## 2024-09-27 RX ORDER — LEVOTHYROXINE SODIUM 50 UG/1
50 TABLET ORAL
Status: DISCONTINUED | OUTPATIENT
Start: 2024-09-28 | End: 2024-10-03 | Stop reason: HOSPADM

## 2024-09-27 RX ORDER — HEPARIN SODIUM 5000 [USP'U]/ML
5000 INJECTION, SOLUTION INTRAVENOUS; SUBCUTANEOUS EVERY 8 HOURS SCHEDULED
Status: DISCONTINUED | OUTPATIENT
Start: 2024-09-27 | End: 2024-10-03 | Stop reason: HOSPADM

## 2024-09-27 RX ORDER — VERAPAMIL HYDROCHLORIDE 180 MG/1
180 TABLET, EXTENDED RELEASE ORAL DAILY
Status: DISCONTINUED | OUTPATIENT
Start: 2024-09-28 | End: 2024-10-03 | Stop reason: HOSPADM

## 2024-09-27 RX ORDER — PANTOPRAZOLE SODIUM 40 MG/1
40 TABLET, DELAYED RELEASE ORAL
Status: DISCONTINUED | OUTPATIENT
Start: 2024-09-28 | End: 2024-10-03 | Stop reason: HOSPADM

## 2024-09-27 RX ORDER — PRAVASTATIN SODIUM 40 MG
40 TABLET ORAL
Status: DISCONTINUED | OUTPATIENT
Start: 2024-09-28 | End: 2024-10-03 | Stop reason: HOSPADM

## 2024-09-27 RX ORDER — SODIUM CHLORIDE 9 MG/ML
3 INJECTION INTRAVENOUS
Status: DISCONTINUED | OUTPATIENT
Start: 2024-09-27 | End: 2024-10-03 | Stop reason: HOSPADM

## 2024-09-27 RX ORDER — SODIUM CHLORIDE, SODIUM GLUCONATE, SODIUM ACETATE, POTASSIUM CHLORIDE, MAGNESIUM CHLORIDE, SODIUM PHOSPHATE, DIBASIC, AND POTASSIUM PHOSPHATE .53; .5; .37; .037; .03; .012; .00082 G/100ML; G/100ML; G/100ML; G/100ML; G/100ML; G/100ML; G/100ML
500 INJECTION, SOLUTION INTRAVENOUS ONCE
Status: COMPLETED | OUTPATIENT
Start: 2024-09-27 | End: 2024-09-27

## 2024-09-27 RX ORDER — SODIUM CHLORIDE 9 MG/ML
3 INJECTION INTRAVENOUS
Status: DISCONTINUED | OUTPATIENT
Start: 2024-09-27 | End: 2024-09-27 | Stop reason: HOSPADM

## 2024-09-27 RX ADMIN — IOHEXOL 85 ML: 350 INJECTION, SOLUTION INTRAVENOUS at 02:19

## 2024-09-27 RX ADMIN — SODIUM CHLORIDE, SODIUM GLUCONATE, SODIUM ACETATE, POTASSIUM CHLORIDE, MAGNESIUM CHLORIDE, SODIUM PHOSPHATE, DIBASIC, AND POTASSIUM PHOSPHATE 500 ML: .53; .5; .37; .037; .03; .012; .00082 INJECTION, SOLUTION INTRAVENOUS at 01:04

## 2024-09-27 RX ADMIN — HEPARIN SODIUM 5000 UNITS: 5000 INJECTION INTRAVENOUS; SUBCUTANEOUS at 21:09

## 2024-09-27 NOTE — ED PROVIDER NOTES
Final diagnoses:   None     ED Disposition       None          Assessment & Plan   {Hyperlinks  Risk Stratification - NIHSS - HEART SCORE - Fill out sepsis note and make sure you call [phone] if severe or septic shock:[id number]}    Medical Decision Making  Patient is a 79 y.o. year-old female w/ hx of dementia, hypothyroidism, HTN presenting with dyspnea x1 wk. VS notable for tachypnea/hypoxia with increased WOB and tachycardia but afebrile. Cardiopulm exam notable for distant heart sounds and bilateral rhonchi without significant lower extremity edema. Workup notable for large pericardial effusion on CTA PE with bilateral pleural effusions. VSS on 2L NC. Accepted for transfer to [facility] with Cardiology recommending TTE and CT surgery consult in AM.      Differential diagnosis includes but is not limited to: ACS, PE, pericarditis/myocarditis, tamponade, PTX, CHF    Workup and treatment as below:    Imaging: CXR, CTA PE with large pericardial effusion, pleural effusions  EKG: {LMMDMEK}  Labs: ***  Meds: ***  Consults: Cardiology, see ED course  Reassessment: ***    Dispo: Patient was transferred to [facility] and admitted to Dr. Faye      Amount and/or Complexity of Data Reviewed  Independent Historian: spouse  Labs: ordered. Decision-making details documented in ED Course.  Radiology: ordered and independent interpretation performed. Decision-making details documented in ED Course.  ECG/medicine tests: ordered.    Risk  Prescription drug management.        ED Course as of 24 0438   Fri Sep 27, 2024   0100 pH, Supa(!): 7.581   0100 pCO2, Supa(!): 22.3   0100 pO2, Supa(!): 170.4   0107 CXR with significant bilateral pulm opacities concerning for vascular congestion   0115 Creatinine: 1.03  baseline   0119 hs TnI 0hr: <2   0306 Per Cardiology, will order TTE and transfer to [facility] for CT surgery eval       Medications   sodium chloride (PF) 0.9 % injection 3 mL (has no administration in time range)   multi-electrolyte  (ISOLYTE-S PH 7.4) bolus 500 mL (500 mL Intravenous New Bag 9/27/24 0104)       ED Risk Strat Scores                                               History of Present Illness   {Hyperlinks  History (Med, Surg, Fam, Social) - Current Medications - Allergies  :[id number]}    Chief Complaint   Patient presents with    Shortness of Breath     Presents via ems from home with c/o dyspnea on excertion starting earlier tonight. Pt denies CP, HA or dizziness. Denies hx of COPD. Per ems, pt has a hx of dementia. Pt disoriented to situation.        Past Medical History:   Diagnosis Date    Disease of thyroid gland     Hyperlipidemia     Hypertension     Memory loss     Shingles     Visual impairment       Past Surgical History:   Procedure Laterality Date    BREAST BIOPSY Left     BREAST CYST EXCISION Right 1983    COLONOSCOPY      EYE SURGERY      JOINT REPLACEMENT      TOTAL HIP ARTHROPLASTY Bilateral     TOTAL KNEE ARTHROPLASTY Right     UPPER GASTROINTESTINAL ENDOSCOPY        Family History   Problem Relation Age of Onset    No Known Problems Mother     No Known Problems Father     No Known Problems Maternal Grandmother     No Known Problems Maternal Grandfather     No Known Problems Paternal Grandmother     No Known Problems Paternal Grandfather     No Known Problems Maternal Aunt     No Known Problems Son       Social History     Tobacco Use    Smoking status: Never    Smokeless tobacco: Never   Vaping Use    Vaping status: Never Used   Substance Use Topics    Alcohol use: No    Drug use: No      E-Cigarette/Vaping    E-Cigarette Use Never User       E-Cigarette/Vaping Substances      I have reviewed and agree with the history as documented.     HPI    Review of Systems        Objective   {Hyperlinks  Historical Vitals - Historical Labs - Chart Review/Microbiology - Last Echo - Code Status  :[id number]}    ED Triage Vitals [09/27/24 0016]   Temperature Pulse Blood Pressure Respirations SpO2 Patient Position -  Orthostatic VS   98.2 °F (36.8 °C) (!) 110 117/66 22 91 % Lying      Temp Source Heart Rate Source BP Location FiO2 (%) Pain Score    Oral Monitor Right arm -- --      Vitals      Date and Time Temp Pulse SpO2 Resp BP Pain Score FACES Pain Rating User   09/27/24 0045 -- 107 91 % 26 132/70 -- -- RM   09/27/24 0016 98.2 °F (36.8 °C) 110 91 % 22 117/66 -- -- RM            Physical Exam    Results Reviewed       Procedure Component Value Units Date/Time    Basic metabolic panel [[id number]]  (Abnormal) Collected: 09/27/24 0033    Lab Status: Final result Specimen: Blood from Arm, Right Updated: 09/27/24 0110     Sodium 133 mmol/L      Potassium 4.3 mmol/L      Chloride 100 mmol/L      CO2 23 mmol/L      ANION GAP 10 mmol/L      BUN 19 mg/dL      Creatinine 1.03 mg/dL      Glucose 133 mg/dL      Calcium 9.1 mg/dL      eGFR 51 ml/min/1.73sq m     Narrative:      National Kidney Disease Foundation guidelines for Chronic Kidney Disease (CKD):     Stage 1 with normal or high GFR (GFR > 90 mL/min/1.73 square meters)    Stage 2 Mild CKD (GFR = 60-89 mL/min/1.73 square meters)    Stage 3A Moderate CKD (GFR = 45-59 mL/min/1.73 square meters)    Stage 3B Moderate CKD (GFR = 30-44 mL/min/1.73 square meters)    Stage 4 Severe CKD (GFR = 15-29 mL/min/1.73 square meters)    Stage 5 End Stage CKD (GFR <15 mL/min/1.73 square meters)  Note: GFR calculation is accurate only with a steady state creatinine    FLU/COVID Rapid Antigen (30 min. TAT) - Preferred screening test in ED [[id number]] Collected: 09/27/24 0101    Lab Status: In process Specimen: Nares from Nose Updated: 09/27/24 0103    CBC and differential [[id number]]  (Abnormal) Collected: 09/27/24 0033    Lab Status: Final result Specimen: Blood from Arm, Right Updated: 09/27/24 0054     WBC 12.63 Thousand/uL      RBC 4.34 Million/uL      Hemoglobin 13.1 g/dL      Hematocrit 39.4 %      MCV 91 fL      MCH 30.2 pg      MCHC 33.2 g/dL      RDW 13.2 %      MPV 9.7 fL      Platelets 320  Thousands/uL      nRBC 0 /100 WBCs      Segmented % 78 %      Immature Grans % 1 %      Lymphocytes % 11 %      Monocytes % 10 %      Eosinophils Relative 0 %      Basophils Relative 0 %      Absolute Neutrophils 9.87 Thousands/µL      Absolute Immature Grans 0.07 Thousand/uL      Absolute Lymphocytes 1.41 Thousands/µL      Absolute Monocytes 1.25 Thousand/µL      Eosinophils Absolute 0.01 Thousand/µL      Basophils Absolute 0.02 Thousands/µL     Blood gas, venous [[id number]]  (Abnormal) Collected: 09/27/24 0033    Lab Status: Final result Specimen: Blood from Arm, Right Updated: 09/27/24 0052     pH, Supa 7.581     pCO2, Supa 22.3 mm Hg      pO2, Supa 170.4 mm Hg      HCO3, Supa 20.5 mmol/L      Base Excess, Supa 0.4 mmol/L      O2 Content, Supa 17.9 ml/dL      O2 HGB, VENOUS 93.7 %     HS Troponin 0hr (reflex protocol) [[id number]] Collected: 09/27/24 0033    Lab Status: In process Specimen: Blood from Arm, Right Updated: 09/27/24 0048    D-dimer, quantitative [[id number]] Collected: 09/27/24 0033    Lab Status: In process Specimen: Blood from Arm, Right Updated: 09/27/24 0048    B-Type Natriuretic Peptide(BNP) [[id number]] Collected: 09/27/24 0033    Lab Status: In process Specimen: Blood from Arm, Right Updated: 09/27/24 0048            X-ray chest 1 view portable    (Results Pending)       Procedures    ED Medication and Procedure Management   Prior to Admission Medications   Prescriptions Last Dose Informant Patient Reported? Taking?   Apoaequorin (Prevagen) 10 MG CAPS  Self Yes No   Sig: Take by mouth   Cholecalciferol (VITAMIN D3) 1000 units CAPS  Self Yes No   Sig: Take 2,000 Units by mouth    levothyroxine 50 mcg tablet   No No   Sig: Take 1 tablet (50 mcg total) by mouth daily in the early morning   omeprazole (PriLOSEC) 40 MG capsule   No No   Sig: Take 1 capsule (40 mg total) by mouth daily   simvastatin (ZOCOR) 10 mg tablet   No No   Sig: TAKE 1 TABLET BY MOUTH EVERY DAY   verapamil (CALAN-SR) 180 mg CR tablet    No No   Sig: TAKE 1 TABLET BY MOUTH EVERY DAY      Facility-Administered Medications: None     Patient's Medications   Discharge Prescriptions    No medications on file     No discharge procedures on file.  ED SEPSIS DOCUMENTATION          discharge procedures on file.  ED SEPSIS DOCUMENTATION            Tawnya Mondragon MD  09/30/24 3619

## 2024-09-27 NOTE — ASSESSMENT & PLAN NOTE
-Presented to the [facility] ED with worsening dyspnea on exertion for approximately 1 week after developing an apparent viral URI  -Noted with elevated D-dimer thus CTA PE study was performed negative for pulmonary embolism but revealing a large pericardial effusion additionally with bilateral pleural effusions   -Transferring here for urgent TTE and cardiology consultation additionally for thoracic surgery availability.   -TTE completed with a EF of 65%, grade 1 diastolic dysfunction, and moderate pericardial effusion circumferential to the heart exhibiting no internal echoes and the largest diameter ring 1.5 cm; there is no echocardiographic evidence of tamponade however there is a small left pleural effusion    PLAN  -Cardiology consult   -Telemetry  -Continuous pulse oximetry  - F/U AM labs

## 2024-09-27 NOTE — ASSESSMENT & PLAN NOTE
-Progressive dementia, patient's  serves as caregiver/POA    PLAN  -Continue fall and delirium precautions

## 2024-09-27 NOTE — ED NOTES
Report called to [facility] [place]. No pickup time per the receiving RN on P5 there is a patient still in the room who won't be leaving until approx 4 pm.  Pt is comfortable at this time with family at bedside.       Erika Bird RN  09/27/24 2439

## 2024-09-27 NOTE — H&P
H&P - Hospitalist   Name: Tanya Stevens 79 y.o. female I MRN: [id number]  Unit/Bed#: [facility] 504-01 I Date of Admission: 9/27/2024   Date of Service: 9/27/2024 I Hospital Day: 0     Assessment & Plan  Pericardial effusion  -Presented to the [facility] ED with worsening dyspnea on exertion for approximately 1 week after developing an apparent viral URI  -Noted with elevated D-dimer thus CTA PE study was performed negative for pulmonary embolism but revealing a large pericardial effusion additionally with bilateral pleural effusions   -Transferring here for urgent TTE and cardiology consultation additionally for thoracic surgery availability.   -TTE completed with a EF of 65%, grade 1 diastolic dysfunction, and moderate pericardial effusion circumferential to the heart exhibiting no internal echoes and the largest diameter ring 1.5 cm; there is no echocardiographic evidence of tamponade however there is a small left pleural effusion    PLAN  -Cardiology consult   -Telemetry  -Continuous pulse oximetry  - F/U AM labs  Benign essential HTN  Home medications include verapamil 180 mg daily     PLAN  -Continue home medication with strict hold parameters   -Monitor blood pressure per protocol   Hyperlipidemia  -Home medication of simvastatin 10 mg daily    PLAN  -Continue home medication    Hypothyroidism  -Home medication of levothyroxine 50 mcg    PLAN  -Continue home medication  Stage 3a chronic kidney disease (HCC)  Lab Results   Component Value Date    EGFR 51 09/27/2024    EGFR 52 12/06/2023    EGFR 47 07/27/2023    CREATININE 1.03 09/27/2024    CREATININE 1.02 12/06/2023    CREATININE 1.11 07/27/2023     -Renal function at baseline    PLAN  -Monitor with BMP  -Avoid/limit nephrotoxins and hypotension  Dementia without behavioral disturbance (HCC)  -Progressive dementia, patient's  serves as caregiver/POA    PLAN  -Continue fall and delirium precautions    VTE Pharmacologic Prophylaxis:   Moderate Risk  (Score 3-4) - Pharmacological DVT Prophylaxis Ordered: heparin.  Code Status: Level 1 - Full Code Full code  Discussion with family: Updated  ( and son) at bedside.    Anticipated Length of Stay: Patient will be admitted on an inpatient basis with an anticipated length of stay of greater than 2 midnights secondary to pericardial effusion requiring urgent TTE, cardiology consult, telemetry monitoring.    History of Present Illness   Chief Complaint: Shortness of breath    Tanya Stevens is a 79 y.o. female with a PMH of hypertension, hypothyroidism, hyperlipidemia, stage IIIa chronic kidney disease, and dementia without behavioral disturbance who presents from [facility] where she presented with shortness of breath and was found pericardial effusion.  She is now at [facility] to receive urgent TTE, cardiology consult and possible thoracic surgical intervention.  She will be monitored closely for progression to cardiac tamponade.    Review of Systems   Constitutional:  Negative for chills and fever.   HENT:  Negative for congestion and sore throat.    Eyes:  Negative for visual disturbance.   Respiratory:  Positive for shortness of breath. Negative for wheezing.    Cardiovascular:  Positive for chest pain.   Gastrointestinal:  Negative for abdominal pain, blood in stool and nausea.   Endocrine: Negative for cold intolerance and heat intolerance.   Genitourinary:  Negative for dysuria and hematuria.   Musculoskeletal:  Negative for gait problem.   Skin:  Negative for rash.   Allergic/Immunologic: Negative for environmental allergies.   Neurological:  Negative for syncope.   Hematological:  Does not bruise/bleed easily.   Psychiatric/Behavioral:  Negative for behavioral problems.        I have reviewed the patient's PMH, PSH, Social History, Family History, Meds, and Allergies  Social History:  Marital Status: /Civil Union   Occupation:     Patient Pre-hospital Living  "Situation: Home, With spouse  Patient Pre-hospital Level of Mobility: walks  Patient Pre-hospital Diet Restrictions: None    Objective     Vitals:   Blood Pressure: 129/75 (09/27/24 1957)  Pulse: 101 (09/27/24 1957)  Temperature: 98.2 °F (36.8 °C) (09/27/24 1957)  Temp Source: Oral (09/27/24 1957)  Respirations: 20 (09/27/24 1957)  Height: 5' 2\" (157.5 cm) (09/27/24 1957)  Weight - Scale: 102 kg (223 lb 15.8 oz) (09/27/24 1957)  SpO2: 93 % (09/27/24 1926)    Physical Exam  Constitutional:       General: She is not in acute distress.     Appearance: She is well-developed. She is ill-appearing. She is not diaphoretic.   HENT:      Head: Normocephalic and atraumatic.      Right Ear: External ear normal.      Left Ear: External ear normal.      Nose: Nose normal.      Mouth/Throat:      Mouth: Oropharynx is clear and moist.   Eyes:      General:         Right eye: No discharge.         Left eye: No discharge.      Extraocular Movements: EOM normal.      Conjunctiva/sclera: Conjunctivae normal.      Pupils: Pupils are equal, round, and reactive to light.   Neck:      Thyroid: No thyromegaly.      Vascular: No JVD.      Trachea: No tracheal deviation.   Cardiovascular:      Rate and Rhythm: Normal rate and regular rhythm.      Heart sounds: Normal heart sounds. No murmur heard.     No friction rub. No gallop.   Pulmonary:      Effort: Respiratory distress present.      Breath sounds: Normal breath sounds. No wheezing.   Chest:      Chest wall: No tenderness.   Abdominal:      General: Bowel sounds are normal. There is no distension.      Palpations: Abdomen is soft.      Tenderness: There is no abdominal tenderness. There is no rebound.   Musculoskeletal:         General: No tenderness or edema. Normal range of motion.      Cervical back: Normal range of motion and neck supple.   Skin:     General: Skin is warm and dry.      Findings: No erythema or rash.   Neurological:      Mental Status: She is alert and oriented to " "person, place, and time.      Coordination: Coordination normal.      Deep Tendon Reflexes: Reflexes are normal and symmetric.   Psychiatric:         Mood and Affect: Mood and affect normal.         Behavior: Behavior normal.         Thought Content: Thought content normal.         Lines/Drains:  Lines/Drains/Airways       Active Status       None                        Additional Data:   Lab Results: I have reviewed the following results: CBC/BMP:   .     09/27/24  0033   WBC 12.63*   HGB 13.1   HCT 39.4      SODIUM 133*   K 4.3      CO2 23   BUN 19   CREATININE 1.03   GLUC 133    , LFTs: No new results in last 24 hours. , PTT/INR:  .     09/27/24  0033   PTT 34   INR 1.17    , Troponin,BNP:  .     09/27/24  0033 09/27/24  0233   HSTNI0 <2  --    HSTNI2  --  <2   *  --     , Lactic Acid:   .     09/27/24  0159   LACTICACID 1.1    , TSH:   Results from last 7 days   Lab Units 09/27/24  0033   TSH 3RD GENERATON uIU/mL 4.965*   , FLU: No components found for: \"INFLUENZA\"  Results from last 7 days   Lab Units 09/27/24  0033   WBC Thousand/uL 12.63*   HEMOGLOBIN g/dL 13.1   HEMATOCRIT % 39.4   PLATELETS Thousands/uL 320   SEGS PCT % 78*   LYMPHO PCT % 11*   MONO PCT % 10   EOS PCT % 0     Results from last 7 days   Lab Units 09/27/24  0033   SODIUM mmol/L 133*   POTASSIUM mmol/L 4.3   CHLORIDE mmol/L 100   CO2 mmol/L 23   BUN mg/dL 19   CREATININE mg/dL 1.03   ANION GAP mmol/L 10   CALCIUM mg/dL 9.1   GLUCOSE RANDOM mg/dL 133     Results from last 7 days   Lab Units 09/27/24  0033   INR  1.17         No results found for: \"HGBA1C\"  Results from last 7 days   Lab Units 09/27/24  0159   LACTIC ACID mmol/L 1.1       Imaging Review: Reviewed radiology reports from this admission including: CTA chest PE study.  Other Studies: EKG was reviewed.  An echocardiogram performed 9/27/2024    Administrative Statements   I have spent a total time of 55 minutes in caring for this patient on the day of the " visit/encounter including Diagnostic results, Prognosis, Risks and benefits of tx options, Instructions for management, Patient and family education, Importance of tx compliance, Risk factor reductions, Impressions, Counseling / Coordination of care, Documenting in the medical record, Reviewing / ordering tests, medicine, procedures  , Obtaining or reviewing history  , and Communicating with other healthcare professionals .    ** Please Note: This note has been constructed using a voice recognition system. **
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
## 2024-09-27 NOTE — ED CARE HANDOFF
Emergency Department Sign Out Note        Sign out and transfer of care from Dr. Mondragon. See Separate Emergency Department note.     The patient, Tanya Stevens, was evaluated by the previous provider for WILKINSON.    Workup Completed:  yes    ED Course / Workup Pending (followup):  Patient found to have pericardial effusion/pleural effusion awaiting transfer to [facility] accepted to CT surgery team                                      Procedures  Medical Decision Making  Amount and/or Complexity of Data Reviewed  Labs: ordered.  Radiology: ordered.    Risk  Prescription drug management.            Disposition  Final diagnoses:   Pleural effusion   Pericardial effusion   Acute hypoxic respiratory failure (HCC)     Time reflects when diagnosis was documented in both MDM as applicable and the Disposition within this note       Time User Action Codes Description Comment    9/27/2024  5:06 AM Tawnya Mondragon Add [J90] Pleural effusion     9/27/2024  5:06 AM Tawnya Mondragon Add [I31.39] Pericardial effusion     9/27/2024  5:06 AM Tawnya Mondragon Add [R09.02] Hypoxia     9/27/2024  5:06 AM WeogufkaTawnya marsh Modify [J90] Pleural effusion     9/27/2024  5:06 AM Tawnya Mondragon Modify [R09.02] Hypoxia     9/27/2024  5:06 AM WeogufkaTawnya marsh Modify [J90] Pleural effusion     9/27/2024  5:06 AM Tawnya Mondragon Remove [R09.02] Hypoxia     9/27/2024  5:07 AM Tawnya Mondragon Add [J96.01] Acute hypoxic respiratory failure (HCC)     9/27/2024  5:07 AM Tawnya Mondragon Modify [J90] Pleural effusion     9/27/2024  5:07 AM Tawnya Mondragon Modify [J96.01] Acute hypoxic respiratory failure (HCC)           ED Disposition       ED Disposition   Transfer to Another Facility-In Network    Condition   --    Date/Time   Fri Sep 27, 2024  5:06 AM    Comment   Tanya Stevens should be transferred out to [facility].               MD Documentation      Flowsheet Row Most Recent Value   Patient Condition The patient has been stabilized such that within reasonable medical probability, no material  deterioration of the patient condition or the condition of the unborn child(alize) is likely to result from the transfer   Reason for Transfer Level of Care needed not available at this facility   Benefits of Transfer Specialized equipment and/or services available at the receiving facility (Include comment)________________________  [Cardiology and CT Surgery care]   Risks of Transfer Potential for delay in receiving treatment, Potential deterioration of medical condition, Loss of IV, Increased discomfort during transfer, Possible worsening of condition or death during transfer   Accepting Physician Dr. Faye   Accepting Facility Name, City & State  [facility]   Sending MD Mondragon   Provider Certification General risk, such as traffic hazards, adverse weather conditions, rough terrain or turbulence, possible failure of equipment (including vehicle or aircraft), or consequences of actions of persons outside the control of the transport personnel, Unanticipated needs of medical equipment and personnel during transport, Risk of worsening condition, The possibility of a transport vehicle being unavailable          RN Documentation      Flowsheet Row Most Recent Value   Accepting Facility Name, [facility] & State  [facility]   Level of Care Basic life support          Follow-up Information    None       Patient's Medications   Discharge Prescriptions    No medications on file     No discharge procedures on file.       ED Provider  Electronically Signed by     Aurora Parra DO  09/27/24 [id number]

## 2024-09-27 NOTE — PLAN OF CARE
Problem: PAIN - ADULT  Goal: Verbalizes/displays adequate comfort level or baseline comfort level  Description: Interventions:  - Encourage patient to monitor pain and request assistance  - Assess pain using appropriate pain scale  - Administer analgesics based on type and severity of pain and evaluate response  - Implement non-pharmacological measures as appropriate and evaluate response  - Consider cultural and social influences on pain and pain management  - Notify physician/advanced practitioner if interventions unsuccessful or patient reports new pain  Outcome: Progressing     Problem: INFECTION - ADULT  Goal: Absence or prevention of progression during hospitalization  Description: INTERVENTIONS:  - Assess and monitor for signs and symptoms of infection  - Monitor lab/diagnostic results  - Monitor all insertion sites, i.e. indwelling lines, tubes, and drains  - Monitor endotracheal if appropriate and nasal secretions for changes in amount and color  - [place] appropriate cooling/warming therapies per order  - Administer medications as ordered  - Instruct and encourage patient and family to use good hand hygiene technique  - Identify and instruct in appropriate isolation precautions for identified infection/condition  Outcome: Progressing

## 2024-09-27 NOTE — ASSESSMENT & PLAN NOTE
Lab Results   Component Value Date    EGFR 51 09/27/2024    EGFR 52 12/06/2023    EGFR 47 07/27/2023    CREATININE 1.03 09/27/2024    CREATININE 1.02 12/06/2023    CREATININE 1.11 07/27/2023     -Renal function at baseline    PLAN  -Monitor with BMP  -Avoid/limit nephrotoxins and hypotension

## 2024-09-27 NOTE — ED NOTES
Incontinent for urine, cleaned up with new brief and linen placed. Waiting for transfer time to reginaldo Bird RN  09/27/24 [id number]

## 2024-09-27 NOTE — ASSESSMENT & PLAN NOTE
Home medications include verapamil 180 mg daily     PLAN  -Continue home medication with strict hold parameters   -Monitor blood pressure per protocol

## 2024-09-27 NOTE — EMTALA/ACUTE CARE TRANSFER
[facility] EMERGENCY DEPARTMENT   [facility] [address]  Dept: [phone]      EMTALA TRANSFER CONSENT    NAME Tanya Stevens                                         1945                              MRN [id number]    I have been informed of my rights regarding examination, treatment, and transfer   by Dr. Tawnya Mondragon MD    Benefits: Specialized equipment and/or services available at the receiving facility (Include comment)________________________ (Cardiology and CT Surgery care)    Risks: Potential for delay in receiving treatment, Potential deterioration of medical condition, Loss of IV, Increased discomfort during transfer, Possible worsening of condition or death during transfer      Consent for Transfer:  I acknowledge that my medical condition has been evaluated and explained to me by the emergency department physician or other qualified medical person and/or my attending physician, who has recommended that I be transferred to the service of  Accepting Physician: Dr. Faye at Accepting Facility Name, City & State : [facility]. The above potential benefits of such transfer, the potential risks associated with such transfer, and the probable risks of not being transferred have been explained to me, and I fully understand them.  The doctor has explained that, in my case, the benefits of transfer outweigh the risks.  I agree to be transferred.    I authorize the performance of emergency medical procedures and treatments upon me in both transit and upon arrival at the receiving facility.  Additionally, I authorize the release of any and all medical records to the receiving facility and request they be transported with me, if possible.  I understand that the safest mode of transportation during a medical emergency is an ambulance and that the Hospital advocates the use of this mode of transport. Risks of traveling to the receiving facility by car, including absence of  medical control, life sustaining equipment, such as oxygen, and medical personnel has been explained to me and I fully understand them.    (BAILEY CORRECT BOX BELOW)  [  ]  I consent to the stated transfer and to be transported by ambulance/helicopter.  [  ]  I consent to the stated transfer, but refuse transportation by ambulance and accept full responsibility for my transportation by car.  I understand the risks of non-ambulance transfers and I exonerate the Hospital and its staff from any deterioration in my condition that results from this refusal.    X___________________________________________    DATE  24  TIME________  Signature of patient or legally responsible individual signing on patient behalf           RELATIONSHIP TO PATIENT_________________________          Provider Certification    NAME Tanya Stevens                                         1945                              MRN [id number]    A medical screening exam was performed on the above named patient.  Based on the examination:    Condition Necessitating Transfer The primary encounter diagnosis was Acute hypoxic respiratory failure (HCC). Diagnoses of Pleural effusion and Pericardial effusion were also pertinent to this visit.    Patient Condition: The patient has been stabilized such that within reasonable medical probability, no material deterioration of the patient condition or the condition of the unborn child(alize) is likely to result from the transfer    Reason for Transfer: Level of Care needed not available at this facility    Transfer Requirements: Facility SLB   Space available and qualified personnel available for treatment as acknowledged by    Agreed to accept transfer and to provide appropriate medical treatment as acknowledged by       Dr. Faye  Appropriate medical records of the examination and treatment of the patient are provided at the time of transfer   STAFF INITIAL WHEN COMPLETED _______  Transfer will be  performed by qualified personnel from    and appropriate transfer equipment as required, including the use of necessary and appropriate life support measures.    Provider Certification: I have examined the patient and explained the following risks and benefits of being transferred/refusing transfer to the patient/family:  General risk, such as traffic hazards, adverse weather conditions, rough terrain or turbulence, possible failure of equipment (including vehicle or aircraft), or consequences of actions of persons outside the control of the transport personnel, Unanticipated needs of medical equipment and personnel during transport, Risk of worsening condition, The possibility of a transport vehicle being unavailable      Based on these reasonable risks and benefits to the patient and/or the unborn child(alize), and based upon the information available at the time of the patient’s examination, I certify that the medical benefits reasonably to be expected from the provision of appropriate medical treatments at another medical facility outweigh the increasing risks, if any, to the individual’s medical condition, and in the case of labor to the unborn child, from effecting the transfer.    X____________________________________________ DATE 09/27/24        TIME_______      ORIGINAL - SEND TO MEDICAL RECORDS   COPY - SEND WITH PATIENT DURING TRANSFER

## 2024-09-28 LAB
ALBUMIN SERPL BCG-MCNC: 3.4 G/DL (ref 3.5–5)
ALP SERPL-CCNC: 73 U/L (ref 34–104)
ALT SERPL W P-5'-P-CCNC: 15 U/L (ref 7–52)
ANION GAP SERPL CALCULATED.3IONS-SCNC: 9 MMOL/L (ref 4–13)
AST SERPL W P-5'-P-CCNC: 18 U/L (ref 13–39)
BASOPHILS # BLD AUTO: 0.02 THOUSANDS/ΜL (ref 0–0.1)
BASOPHILS NFR BLD AUTO: 0 % (ref 0–1)
BILIRUB SERPL-MCNC: 0.71 MG/DL (ref 0.2–1)
BUN SERPL-MCNC: 18 MG/DL (ref 5–25)
CALCIUM ALBUM COR SERPL-MCNC: 9.3 MG/DL (ref 8.3–10.1)
CALCIUM SERPL-MCNC: 8.8 MG/DL (ref 8.4–10.2)
CHLORIDE SERPL-SCNC: 101 MMOL/L (ref 96–108)
CO2 SERPL-SCNC: 28 MMOL/L (ref 21–32)
CREAT SERPL-MCNC: 1.03 MG/DL (ref 0.6–1.3)
CRP SERPL QL: 262.8 MG/L
EOSINOPHIL # BLD AUTO: 0.02 THOUSAND/ΜL (ref 0–0.61)
EOSINOPHIL NFR BLD AUTO: 0 % (ref 0–6)
ERYTHROCYTE [DISTWIDTH] IN BLOOD BY AUTOMATED COUNT: 13.2 % (ref 11.6–15.1)
ERYTHROCYTE [SEDIMENTATION RATE] IN BLOOD: 73 MM/HOUR (ref 0–29)
GFR SERPL CREATININE-BSD FRML MDRD: 51 ML/MIN/1.73SQ M
GLUCOSE SERPL-MCNC: 99 MG/DL (ref 65–140)
HCT VFR BLD AUTO: 37 % (ref 34.8–46.1)
HGB BLD-MCNC: 12 G/DL (ref 11.5–15.4)
IMM GRANULOCYTES # BLD AUTO: 0.14 THOUSAND/UL (ref 0–0.2)
IMM GRANULOCYTES NFR BLD AUTO: 1 % (ref 0–2)
LYMPHOCYTES # BLD AUTO: 0.93 THOUSANDS/ΜL (ref 0.6–4.47)
LYMPHOCYTES NFR BLD AUTO: 9 % (ref 14–44)
MAGNESIUM SERPL-MCNC: 1.9 MG/DL (ref 1.9–2.7)
MCH RBC QN AUTO: 30 PG (ref 26.8–34.3)
MCHC RBC AUTO-ENTMCNC: 32.4 G/DL (ref 31.4–37.4)
MCV RBC AUTO: 93 FL (ref 82–98)
MONOCYTES # BLD AUTO: 1.22 THOUSAND/ΜL (ref 0.17–1.22)
MONOCYTES NFR BLD AUTO: 11 % (ref 4–12)
NEUTROPHILS # BLD AUTO: 8.55 THOUSANDS/ΜL (ref 1.85–7.62)
NEUTS SEG NFR BLD AUTO: 79 % (ref 43–75)
NRBC BLD AUTO-RTO: 0 /100 WBCS
PHOSPHATE SERPL-MCNC: 2.8 MG/DL (ref 2.3–4.1)
PLATELET # BLD AUTO: 320 THOUSANDS/UL (ref 149–390)
PMV BLD AUTO: 9.5 FL (ref 8.9–12.7)
POTASSIUM SERPL-SCNC: 4 MMOL/L (ref 3.5–5.3)
PROT SERPL-MCNC: 6.7 G/DL (ref 6.4–8.4)
RBC # BLD AUTO: 4 MILLION/UL (ref 3.81–5.12)
SODIUM SERPL-SCNC: 138 MMOL/L (ref 135–147)
WBC # BLD AUTO: 10.88 THOUSAND/UL (ref 4.31–10.16)

## 2024-09-28 PROCEDURE — 84100 ASSAY OF PHOSPHORUS: CPT | Performed by: FAMILY MEDICINE

## 2024-09-28 PROCEDURE — 99222 1ST HOSP IP/OBS MODERATE 55: CPT | Performed by: INTERNAL MEDICINE

## 2024-09-28 PROCEDURE — 85652 RBC SED RATE AUTOMATED: CPT | Performed by: PHYSICIAN ASSISTANT

## 2024-09-28 PROCEDURE — 85025 COMPLETE CBC W/AUTO DIFF WBC: CPT | Performed by: FAMILY MEDICINE

## 2024-09-28 PROCEDURE — 99232 SBSQ HOSP IP/OBS MODERATE 35: CPT | Performed by: INTERNAL MEDICINE

## 2024-09-28 PROCEDURE — 80053 COMPREHEN METABOLIC PANEL: CPT | Performed by: FAMILY MEDICINE

## 2024-09-28 PROCEDURE — 83735 ASSAY OF MAGNESIUM: CPT | Performed by: FAMILY MEDICINE

## 2024-09-28 PROCEDURE — 86140 C-REACTIVE PROTEIN: CPT

## 2024-09-28 RX ORDER — COLCHICINE 0.6 MG/1
0.6 TABLET ORAL 2 TIMES DAILY
Status: DISCONTINUED | OUTPATIENT
Start: 2024-09-28 | End: 2024-10-03 | Stop reason: HOSPADM

## 2024-09-28 RX ORDER — FUROSEMIDE 10 MG/ML
20 INJECTION INTRAMUSCULAR; INTRAVENOUS ONCE
Status: COMPLETED | OUTPATIENT
Start: 2024-09-28 | End: 2024-09-28

## 2024-09-28 RX ORDER — IBUPROFEN 600 MG/1
600 TABLET, FILM COATED ORAL 2 TIMES DAILY
Status: DISCONTINUED | OUTPATIENT
Start: 2024-09-28 | End: 2024-10-03 | Stop reason: HOSPADM

## 2024-09-28 RX ADMIN — PRAVASTATIN SODIUM 40 MG: 40 TABLET ORAL at 17:28

## 2024-09-28 RX ADMIN — IBUPROFEN 600 MG: 600 TABLET, FILM COATED ORAL at 13:54

## 2024-09-28 RX ADMIN — FUROSEMIDE 20 MG: 10 INJECTION, SOLUTION INTRAMUSCULAR; INTRAVENOUS at 11:18

## 2024-09-28 RX ADMIN — COLCHICINE 0.6 MG: 0.6 TABLET ORAL at 17:28

## 2024-09-28 RX ADMIN — LEVOTHYROXINE SODIUM 50 MCG: 50 TABLET ORAL at 05:15

## 2024-09-28 RX ADMIN — IBUPROFEN 600 MG: 600 TABLET, FILM COATED ORAL at 21:10

## 2024-09-28 RX ADMIN — COLCHICINE 0.6 MG: 0.6 TABLET ORAL at 09:35

## 2024-09-28 RX ADMIN — HEPARIN SODIUM 5000 UNITS: 5000 INJECTION INTRAVENOUS; SUBCUTANEOUS at 13:54

## 2024-09-28 RX ADMIN — HEPARIN SODIUM 5000 UNITS: 5000 INJECTION INTRAVENOUS; SUBCUTANEOUS at 05:15

## 2024-09-28 RX ADMIN — PANTOPRAZOLE SODIUM 40 MG: 40 TABLET, DELAYED RELEASE ORAL at 05:15

## 2024-09-28 RX ADMIN — VERAPAMIL HYDROCHLORIDE 180 MG: 180 TABLET ORAL at 09:02

## 2024-09-28 RX ADMIN — HEPARIN SODIUM 5000 UNITS: 5000 INJECTION INTRAVENOUS; SUBCUTANEOUS at 21:11

## 2024-09-28 NOTE — ASSESSMENT & PLAN NOTE
Lab Results   Component Value Date    EGFR 51 09/28/2024    EGFR 51 09/27/2024    EGFR 52 12/06/2023    CREATININE 1.03 09/28/2024    CREATININE 1.03 09/27/2024    CREATININE 1.02 12/06/2023     Renal function currently at baseline, will need to be monitored closely as patient requires NSAIDs for above pericardial effusion

## 2024-09-28 NOTE — CASE MANAGEMENT
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/2024     Dementia without behavioral disturbance (HCC) 2023     Rash 2023     Iron deficiency anemia secondary to inadequate dietary iron intake 2023     Low folate 2023     Stage 3a chronic kidney disease (HCC) 2022     Obesity, morbid (HCC)      Hypothyroidism 2014     Benign essential HTN 2012     Hyperlipidemia 2012     Vitamin D deficiency 2012       LOS (days): 1  Geometric Mean LOS (GMLOS) (days):   Days to GMLOS:     OBJECTIVE:  Risk of Unplanned Readmission Score: 7.12         Current admission status: Inpatient   Preferred Pharmacy:   [facility]/pharmacy #[id number] - [address] [address]  Phone: [phone] Fax: [phone]    Primary Care Provider: Alicia Linton MD    [facility]  Secondary Insurance:     DISCHARGE DETAILS:    Additional Comments: CM called pt's  to complete initial assessment however pt's  did not answer, CM left a voicemail requesting a return call. CM department to follow.

## 2024-09-28 NOTE — CONSULTS
Consult - Cardiology   Tanya Stevens 79 y.o. female MRN: [id number]  Unit/Bed#: [facility] 504-01 Encounter: [id number]    Reason For Consult: Pericardial effusion   Outpatient Cardiologist: None            ASSESSMENT/PLAN:  Pericardial effusion  Presented to [facility] with dyspnea x 1 week. Transferred to [facility] for evaluation and possible thoracic surgery intervention.   CT PE study: No PE.  Large pericardial effusion, small bilateral pleural effusions, incidental thyroid nodule, large hiatal hernia.  TTE: EF 65%, G1 DD, AV sclerosis, ascending aorta measures 3.8 cm, moderate pericardial effusion circumferential to the heart fluid exhibits no internal echoes, largest diameter 1.5 cm no echo evidence of tamponade there is a small left pleural effusion.  Hemodynamically stable, /82  EKG: NSR, possible inferior infarct, no ST elevation or LA depression noted.  CBC: Mild leukocytosis -- WBC 10.88, otherwise normal  BMP: Normal  TSH -- 4.965, T4 -- 1.55  CRP -- 262.8, Add ESR to AM labs.   Troponins: <2/<2/<2    Telemetry: No events, SR, HR 70-80s  Hypertension  Hyperlipidemia   Hypothyriodism  CKD 3a  Iron deficiency anemia  B12 deficiency  Dementia      -- Patient hemodynamically stable without urgent need for thoracic surgery intervention  -- No obvious etiology of her pericardial effusion; suspect viral/infectious etiology with mild leukocytosis and elevated CRP. Check ESR to complete labs.   -- Start Colchicine 0.6mg BID and assess response  -- Wean O2 as tolerated   -- Defer adjustment to her Levothyroxine to primary team      History of Present Illness:  Tanya Stevens is a 79 y.o. female with pmh as above presents via transfer from [facility] for evaluation for pericardial effusion.   Patient has baseline dementia and not able to illicit history.   History provided via chart review and her  Pankaj and son Pankaj.    noted more shortness of breath occurring after a few steps, several  episodes over the past 1 week. Felt like this was worsening so took her to the ED for evaluation.   She underwent CXR showing cardiomegaly and pleural effusions. Subsequent CT pe study showed large pericardial effusion. Patient was transferred with cardiology evaluation. TTE shows preserved EF and moderate pericardial effusion.    denies patient having any recent illness, fever or chills. No recent trauma or mechanical falls. No recent synthroid changes. No worsening renal function. No recent  chest pain, discomfort, LE edema, wt gain. Of note,  noticed patient with poor appetite and eating very slowly due to swallowing difficulties. No history of this in the past.    Past Medical History:        Past Medical History:   Diagnosis Date    Disease of thyroid gland     Hyperlipidemia     Hypertension     Memory loss     Shingles     Visual impairment       Past Surgical History:   Procedure Laterality Date    BREAST BIOPSY Left     BREAST CYST EXCISION Right 1983    COLONOSCOPY      EYE SURGERY      JOINT REPLACEMENT      TOTAL HIP ARTHROPLASTY Bilateral     TOTAL KNEE ARTHROPLASTY Right     UPPER GASTROINTESTINAL ENDOSCOPY          Allergy:        Allergies   Allergen Reactions    Epinephrine     Fluconazole     Naproxen     Shellfish-Derived Products - Food Allergy     Sulfa Antibiotics        Medications:       Prior to Admission medications    Medication Sig Start Date End Date Taking? Authorizing Provider   Apoaequorin (Prevagen) 10 MG CAPS Take by mouth    Historical Provider, MD   Cholecalciferol (VITAMIN D3) 1000 units CAPS Take 2,000 Units by mouth     Historical Provider, MD   levothyroxine 50 mcg tablet Take 1 tablet (50 mcg total) by mouth daily in the early morning 8/22/24   Alicia Linton MD   omeprazole (PriLOSEC) 40 MG capsule Take 1 capsule (40 mg total) by mouth daily 5/2/24   Theresa Corley MD   simvastatin (ZOCOR) 10 mg tablet TAKE 1 TABLET BY MOUTH EVERY DAY 4/28/24   Alicia  MD Royer   verapamil (CALAN-SR) 180 mg CR tablet TAKE 1 TABLET BY MOUTH EVERY DAY 4/28/24   Alicia Linton MD       Family History:     Family History   Problem Relation Age of Onset    No Known Problems Mother     No Known Problems Father     No Known Problems Maternal Grandmother     No Known Problems Maternal Grandfather     No Known Problems Paternal Grandmother     No Known Problems Paternal Grandfather     No Known Problems Maternal Aunt     No Known Problems Son         Social History:       Social History     Socioeconomic History    Marital status: /Civil Union     Spouse name: Not on file    Number of children: Not on file    Years of education: Not on file    Highest education level: Not on file   Occupational History    Not on file   Tobacco Use    Smoking status: Never    Smokeless tobacco: Never   Vaping Use    Vaping status: Never Used   Substance and Sexual Activity    Alcohol use: No    Drug use: No    Sexual activity: Not on file   Other Topics Concern    Not on file   Social History Narrative    Not on file     Social Determinants of Health     Financial Resource Strain: Not on file   Food Insecurity: Patient Declined (8/16/2024)    Hunger Vital Sign     Worried About Running Out of Food in the Last Year: Patient declined     Ran Out of Food in the Last Year: Patient declined   Transportation Needs: Patient Declined (8/16/2024)    PRAPARE - Transportation     Lack of Transportation (Medical): Patient declined     Lack of Transportation (Non-Medical): Patient declined   Physical Activity: Not on file   Stress: Not on file   Social Connections: Not on file   Intimate Partner Violence: Not on file   Housing Stability: Patient Declined (8/22/2024)    Housing Stability Vital Sign     Unable to Pay for Housing in the Last Year: Patient declined     Number of Times Moved in the Last Year: 0     Homeless in the Last Year: Patient declined       Weights/BMI:    Wt Readings from Last 3  Encounters:   09/27/24 102 kg (223 lb 15.8 oz)   09/27/24 94.8 kg (209 lb)   09/24/24 93.7 kg (206 lb 9.6 oz)   , Body mass index is 40.97 kg/m².      ROS:  14 point ROS negative except as outlined above  Remainder review of systems is negative    Exam:  General: Alert, oriented and in no acute distress, cooperative  Head: Normocephalic, atraumatic.  Eyes: PERRLA. No icterus. Normal Conjunctiva.   Oropharynx: Moist and normal-appearing mucosa  Neck: Supple, symmetrical, trachea midline, JVD not appreciated.   Heart: +distant heart sounds. RRR, no murmur, rub or gallop, S1 & S2 normal   Lungs: +decreased breath sounds at bases. Normal air entry, lungs clear to auscultation and no rales, rhonchi or wheezing   Abdomen: Flat, normal findings: bowel sounds normal and soft, non-tender  Lower Limbs:  No pitting edema, 2+ peripheral pulses, capillary refill within normal limits  Musculoskeletal: ROM grossly normal

## 2024-09-28 NOTE — CASE MANAGEMENT
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/2024     Dementia without behavioral disturbance (HCC) 2023     Rash 2023     Iron deficiency anemia secondary to inadequate dietary iron intake 2023     Low folate 2023     Stage 3a chronic kidney disease (HCC) 2022     Obesity, morbid (HCC)      Hypothyroidism 2014     Benign essential HTN 2012     Hyperlipidemia 2012     Vitamin D deficiency 2012       LOS (days): 1  Geometric Mean LOS (GMLOS) (days):   Days to GMLOS:     OBJECTIVE:    Risk of Unplanned Readmission Score: 7.12         Current admission status: Inpatient       Preferred Pharmacy:   [id number] - [address] [address]  Phone: [phone] Fax: [phone]    Primary Care Provider: Alicia Linton MD    Primary Insurance: [facility]  Secondary Insurance:     ASSESSMENT:  Active Health Care Proxies       Hilda Pankaj Health Care Representative - Spouse   Primary Phone: [phone] (Home)                 Advance Directives  Does patient have a Health Care POA?: Yes  Does patient have Advance Directives?: Yes  Advance Directives: Living will, Power of  for health care  Primary Contact: Pankaj Stevens/ ([phone])    Readmission Root Cause  30 Day Readmission: No    Patient Information  Admitted from:: Home  Mental Status: Confused  During Assessment patient was accompanied by: Spouse  Assessment information provided by:: Spouse  Primary Caregiver: Self  Support  Systems: Spouse/significant other, Son  County of Residence: [place]  What city do you live in?: [place]  Home entry access options. Select all that apply.: Stairs  Number of steps to enter home.: 2 (Through garage)  Do the steps have railings?: No  Type of Current Residence: [facility]  Living Arrangements: Lives w/ Spouse/significant other    Activities of Daily Living Prior to Admission  Functional Status: Independent  Completes ADLs independently?: Yes ( assists with bathing but is independent for other tasks)  Ambulates independently?: Yes  Does patient use assisted devices?: No  Does patient currently own DME?: Yes  What DME does the patient currently own?: Walker  Does patient have a history of Outpatient Therapy (PT/OT)?: Yes  Does the patient have a history of Short-Term Rehab?: No  Does patient have a history of HHC?: No  Does patient currently have HHC?: No    Patient Information Continued  Income Source: SSI/SSD  Does patient have prescription coverage?: Yes  Does patient have a history of substance abuse?: No  Does patient have a history of Mental Health Diagnosis?: No    Means of Transportation  Means of Transport to Appts:: Family transport      Social Determinants of Health (SDOH)      Flowsheet Row Most Recent Value   Housing Stability    In the last 12 months, was there a time when you were not able to pay the mortgage or rent on time? N   In the past 12 months, how many times have you moved where you were living? 0   At any time in the past 12 months, were you homeless or living in a shelter (including now)? N   Transportation Needs    In the past 12 months, has lack of transportation kept you from medical appointments or from getting medications? no   In the past 12 months, has lack of transportation kept you from meetings, work, or from getting things needed for daily living? No   Food Insecurity    Within the past 12 months, you worried that your food would run out before you got the money  to buy more. Never true   Within the past 12 months, the food you bought just didn't last and you didn't have money to get more. Never true   Utilities    In the past 12 months has the electric, gas, oil, or water company threatened to shut off services in your home? No            DISCHARGE DETAILS:    Discharge planning discussed with:: Pt's      CM contacted family/caregiver?: Yes (Assessment completed with pt's  at bedside)  Were Treatment Team discharge recommendations reviewed with patient/caregiver?: Yes  Did patient/caregiver verbalize understanding of patient care needs?: Yes  Were patient/caregiver advised of the risks associated with not following Treatment Team discharge recommendations?: Yes    Contacts  Patient Contacts: Pankaj Stevens/  Relationship to Patient:: Family  Contact Method: In Person  Reason/Outcome: Continuity of Care, Emergency Contact, Discharge Planning    Other Referral/Resources/Interventions Provided:  Interventions: None Indicated    Treatment Team Recommendation: Home  Discharge Destination Plan:: Home  Transport at Discharge : Family     Additional Comments: As pt is confused, CM met with pt's  at bedside who provided assessment information. Pt's  reports that he and pt reside in a ranch style home that has 2 WILIAM through the garage. He reports that pt is primarily independent with ADLs but  assists pt with bathing. Pt is independent with ADLs, has a rolling walker but does not use. Pt has a history of OPPT but no reported history of HHC or STR. CM department to remain available for discharge concerns or needs.

## 2024-09-28 NOTE — PROGRESS NOTES
Progress Note - Internal Medicine   Name: Tanya Stevens 79 y.o. female I MRN: [id number]  Unit/Bed#: [facility] 504-01 I Date of Admission: 9/27/2024   Date of Service: 9/28/2024 I Hospital Day: 1    Assessment & Plan  Pericardial effusion  Presented to [facility] with progressive dyspnea found to have moderate-sized pleural effusion post UTI  Echocardiogram showing preserved ejection fraction 65% grade 1 diastolic stock dysfunction moderate pericardial effusion circumferential with no evidence of tamponade -patient was subsequently transferred here  Seen by cardiology recommend medical therapy without evidence to warrant urgent thoracic surgery evaluation  Initiated on ibuprofen, colchicine  Continue telemetry, pulse oximetry  Benign essential HTN  Renal function stable and being monitored continue verapamil  Hyperlipidemia  Continue statin  Hypothyroidism  TSH within normal ranges continue prior dose levothyroxine  Stage 3a chronic kidney disease (HCC)  Lab Results   Component Value Date    EGFR 51 09/28/2024    EGFR 51 09/27/2024    EGFR 52 12/06/2023    CREATININE 1.03 09/28/2024    CREATININE 1.03 09/27/2024    CREATININE 1.02 12/06/2023     Renal function currently at baseline, will need to be monitored closely as patient requires NSAIDs for above pericardial effusion  Dementia without behavioral disturbance (HCC)  Stable    Disposition: Remain in hospital for monitoring and observation and treatment of pericardial effusion      History of Present Illness   Patient seen and examined. No acute events overnight.  Patient states she is feeling generally fine and denies any chest pain or shortness of breath at rest.  States she does not know why she is in the hospital.    Objective     Vitals:    09/28/24 0900 09/28/24 1105 09/28/24 1105 09/28/24 1513   BP:  137/78 137/78 129/77   BP Location:  Right arm  Right arm   Pulse:  91 89 79   Resp:  17  17   Temp:  98.7 °F (37.1 °C) 98.7 °F (37.1 °C) 98.1 °F (36.7 °C)    TempSrc:  Oral  Oral   SpO2: 92% 92% 92% 93%   Weight:       Height:          Temperature:   Temp (24hrs), Av.3 °F (36.8 °C), Min:97.6 °F (36.4 °C), Max:98.7 °F (37.1 °C)    Temperature: 98.1 °F (36.7 °C)  Intake & Output:  I/O          07 07 07 07 07 0700    P.O.   240    Total Intake(mL/kg)   240 (2.4)    Urine (mL/kg/hr)   379 (0.4)    Total Output   379    Net   -139           Unmeasured Urine Occurrence  3 x 1 x          Weights:   IBW (Ideal Body Weight): 50.1 kg    Body mass index is 40.97 kg/m².  Weight (last 2 days)       Date/Time Weight    24 102 (223.99)    24 19:26:16 102 (223.99)          Physical Exam  Constitutional:       General: She is not in acute distress.     Appearance: She is not ill-appearing.      Comments: Elderly female   HENT:      Mouth/Throat:      Mouth: Mucous membranes are moist.   Cardiovascular:      Rate and Rhythm: Normal rate and regular rhythm.      Pulses: Normal pulses.      Heart sounds: No murmur heard.  Pulmonary:      Effort: Pulmonary effort is normal. No respiratory distress.      Breath sounds: No wheezing.   Abdominal:      General: Abdomen is flat.      Palpations: Abdomen is soft.   Neurological:      General: No focal deficit present.      Comments: Oriented x 2, demented           Lab Results: I have reviewed the following results:   Recent Labs     24  0033 24  0159 24  0233 24  0430   WBC 12.63*  --   --  10.88*   HGB 13.1  --   --  12.0   HCT 39.4  --   --  37.0     --   --  320   SODIUM 133*  --   --  138   K 4.3  --   --  4.0     --   --  101   CO2 23  --   --  28   BUN 19  --   --  18   CREATININE 1.03  --   --  1.03   GLUC 133  --   --  99   MG  --   --   --  1.9   PHOS  --   --   --  2.8   AST  --   --   --  18   ALT  --   --   --  15   ALB  --   --   --  3.4*   TBILI  --   --   --  0.71   ALKPHOS  --   --   --  73   PTT 34  --   --   --    INR 1.17   --   --   --    HSTNI0 <2  --   --   --    HSTNI2  --   --  <2  --    *  --   --   --    LACTICACID  --  1.1  --   --      Imaging Review: Reviewed radiology reports from this admission including: Echocardiogram.  Other Studies: No additional pertinent studies reviewed.    Currently Ordered Meds:   Current Facility-Administered Medications:     colchicine (COLCRYS) tablet 0.6 mg, BID    heparin (porcine) subcutaneous injection 5,000 Units, Q8H DANIELITO **AND** Platelet count, Once    ibuprofen (MOTRIN) tablet 600 mg, BID    levothyroxine tablet 50 mcg, Early Morning    pantoprazole (PROTONIX) EC tablet 40 mg, Early Morning    pravastatin (PRAVACHOL) tablet 40 mg, Daily With Dinner    [COMPLETED] Insert peripheral IV, Once **AND** sodium chloride (PF) 0.9 % injection 3 mL, Q1H PRN    verapamil (CALAN-SR) CR tablet 180 mg, Daily  VTE Pharmacologic Prophylaxis: Heparin    Portions of the record may have been created with voice recognition software.

## 2024-09-28 NOTE — ASSESSMENT & PLAN NOTE
Presented to [facility] with progressive dyspnea found to have moderate-sized pleural effusion post UTI  Echocardiogram showing preserved ejection fraction 65% grade 1 diastolic stock dysfunction moderate pericardial effusion circumferential with no evidence of tamponade -patient was subsequently transferred here  Seen by cardiology recommend medical therapy without evidence to warrant urgent thoracic surgery evaluation  Initiated on ibuprofen, colchicine  Continue telemetry, pulse oximetry

## 2024-09-29 LAB
ATRIAL RATE: 103 BPM
ATRIAL RATE: 110 BPM
ATRIAL RATE: 99 BPM
P AXIS: 45 DEGREES
P AXIS: 50 DEGREES
P AXIS: 57 DEGREES
PR INTERVAL: 142 MS
PR INTERVAL: 146 MS
PR INTERVAL: 152 MS
QRS AXIS: 46 DEGREES
QRS AXIS: 58 DEGREES
QRS AXIS: 59 DEGREES
QRSD INTERVAL: 76 MS
QRSD INTERVAL: 78 MS
QRSD INTERVAL: 80 MS
QT INTERVAL: 338 MS
QT INTERVAL: 340 MS
QT INTERVAL: 346 MS
QTC INTERVAL: 444 MS
QTC INTERVAL: 445 MS
QTC INTERVAL: 457 MS
T WAVE AXIS: 69 DEGREES
T WAVE AXIS: 77 DEGREES
T WAVE AXIS: 78 DEGREES
VENTRICULAR RATE: 103 BPM
VENTRICULAR RATE: 110 BPM
VENTRICULAR RATE: 99 BPM

## 2024-09-29 PROCEDURE — 99232 SBSQ HOSP IP/OBS MODERATE 35: CPT | Performed by: INTERNAL MEDICINE

## 2024-09-29 PROCEDURE — 93010 ELECTROCARDIOGRAM REPORT: CPT | Performed by: INTERNAL MEDICINE

## 2024-09-29 RX ORDER — FUROSEMIDE 10 MG/ML
40 INJECTION INTRAMUSCULAR; INTRAVENOUS
Status: DISCONTINUED | OUTPATIENT
Start: 2024-09-29 | End: 2024-09-30

## 2024-09-29 RX ADMIN — FUROSEMIDE 40 MG: 10 INJECTION, SOLUTION INTRAMUSCULAR; INTRAVENOUS at 10:11

## 2024-09-29 RX ADMIN — HEPARIN SODIUM 5000 UNITS: 5000 INJECTION INTRAVENOUS; SUBCUTANEOUS at 20:32

## 2024-09-29 RX ADMIN — PRAVASTATIN SODIUM 40 MG: 40 TABLET ORAL at 17:10

## 2024-09-29 RX ADMIN — VERAPAMIL HYDROCHLORIDE 180 MG: 180 TABLET ORAL at 09:05

## 2024-09-29 RX ADMIN — HEPARIN SODIUM 5000 UNITS: 5000 INJECTION INTRAVENOUS; SUBCUTANEOUS at 13:39

## 2024-09-29 RX ADMIN — COLCHICINE 0.6 MG: 0.6 TABLET ORAL at 09:10

## 2024-09-29 RX ADMIN — COLCHICINE 0.6 MG: 0.6 TABLET ORAL at 17:10

## 2024-09-29 RX ADMIN — IBUPROFEN 600 MG: 600 TABLET, FILM COATED ORAL at 17:09

## 2024-09-29 RX ADMIN — HEPARIN SODIUM 5000 UNITS: 5000 INJECTION INTRAVENOUS; SUBCUTANEOUS at 05:55

## 2024-09-29 RX ADMIN — FUROSEMIDE 40 MG: 10 INJECTION, SOLUTION INTRAMUSCULAR; INTRAVENOUS at 17:10

## 2024-09-29 RX ADMIN — PANTOPRAZOLE SODIUM 40 MG: 40 TABLET, DELAYED RELEASE ORAL at 05:55

## 2024-09-29 RX ADMIN — IBUPROFEN 600 MG: 600 TABLET, FILM COATED ORAL at 09:05

## 2024-09-29 RX ADMIN — LEVOTHYROXINE SODIUM 50 MCG: 50 TABLET ORAL at 05:55

## 2024-09-29 NOTE — ASSESSMENT & PLAN NOTE
Presented to [facility] with progressive dyspnea found to have moderate-sized pleural effusion post UTI  Echocardiogram showing preserved ejection fraction 65% grade 1 diastolic stock dysfunction moderate pericardial effusion circumferential with no evidence of tamponade -patient was subsequently transferred here  Seen by cardiology recommend medical therapy without evidence to warrant urgent thoracic surgery evaluation  Initiated on ibuprofen, colchicine by cardiology  Continue telemetry, pulse oximetry

## 2024-09-29 NOTE — ASSESSMENT & PLAN NOTE
Lab Results   Component Value Date    EGFR 51 09/28/2024    EGFR 51 09/27/2024    EGFR 52 12/06/2023    CREATININE 1.03 09/28/2024    CREATININE 1.03 09/27/2024    CREATININE 1.02 12/06/2023     Renal function currently at baseline  monitored closely as patient requires NSAIDs for above pericardial effusion as well as diuretics for volume overload

## 2024-09-29 NOTE — UTILIZATION REVIEW
Initial Clinical Review    Admission: Date/Time/Statement:   Admission Orders (From admission, onward)       Ordered        09/27/24 1926  INPATIENT ADMISSION  Once                          Orders Placed This Encounter   Procedures    INPATIENT ADMISSION     Standing Status:   Standing     Number of Occurrences:   1     Order Specific Question:   Level of Care     Answer:   Med Surg [16]     Order Specific Question:   Estimated length of stay     Answer:   More than 2 Midnights     Order Specific Question:   Certification     Answer:   I certify that inpatient services are medically necessary for this patient for a duration of greater than two midnights. See H&P and MD Progress Notes for additional information about the patient's course of treatment.     Initial Presentation: 79 y.o. female to [facility] transferred from [facility] ED where she initially presented with worsening dyspnea on exertion for ~ 1 wk after developing an apparent viral URI. D-dimer was, CTA PE study was done - neg for pulmonary embolism but showed a large pericardial effusion additionally with b/l pleural effusions. Transferred to [facility] for urgent TTE, cardiology and Thoracic Sx evals and further tx.  PMHx: HTN, HLD, hypothyroidism, CKD III, dementia.  On presentation pt ill-appearing, + respiratory distress. WBC 12,63, sodium 133. . TSH 4.965.   Admitted Inpatient to [facility] unit with Pericardial Effusion - consult cardiology. Telemetry, cont Pox. Labs in AM. Continue pta po meds.     Anticipated Length of Stay/Certification Statement: Patient will be admitted on an inpatient basis with an anticipated length of stay of greater than 2 midnights secondary to pericardial effusion requiring urgent TTE, cardiology consult, telemetry monitoring.     Cardiology consult -- Etiology is most likely viral pericarditis. Recommend medical therapy with colchicine and ibuprofen. IV lasix x1 this am. Monitor I/Os, daily wts. Monitor renal function      Date: 9/28   Day 2: pt denies complaints.  A&OX2, confused. Continue ibuprofen, colchicine. On 4L O2. Continue po meds. Supportive care.    Date: 9/29  Day 3: Has surpassed a 2nd midnight with active treatments and services.  Denies chest discomfort or sob but remains with persistent nonproductive coughing/wheezing. Remains on O2 via NC @ 3L. Scattered wheezes on exam. IV lasix 20mg x1 given yest AM -- minimal net output -85mL overnight. Continue colchicine and ibuprofen. Pt with evidence of volume overload. Diuresis today. Monitor renal function, I/O and daily weights. F/u echo in 2 wks re: size of pericardial effusion.         Scheduled Medications:  colchicine, 0.6 mg, Oral, BID  furosemide, 40 mg, Intravenous, BID (diuretic)  heparin (porcine), 5,000 Units, Subcutaneous, Q8H DANIELITO  ibuprofen, 600 mg, Oral, BID  levothyroxine, 50 mcg, Oral, Early Morning  pantoprazole, 40 mg, Oral, Early Morning  pravastatin, 40 mg, Oral, Daily With Dinner  verapamil, 180 mg, Oral, Daily    PRN Meds:  sodium chloride (PF), 3 mL, Intravenous, Q1H PRN      ED Triage Vitals   Temperature Pulse Respirations Blood Pressure SpO2 Pain Score   09/27/24 1957 09/27/24 1926 09/27/24 1957 09/27/24 1926 09/27/24 1926 09/27/24 1926   98.2 °F (36.8 °C) 101 20 129/75 93 % No Pain     Weight (last 2 days)       Date/Time Weight    09/27/24 1957 102 (223.99)    09/27/24 19:26:16 102 (223.99)            Vital Signs (last 3 days)       Date/Time Temp Pulse Resp BP MAP (mmHg) SpO2 Calculated FIO2 (%) - Nasal Cannula Nasal Cannula O2 Flow Rate (L/min) O2 Device Patient Position - Orthostatic VS [place] Coma Scale Score Pain    09/29/24 1341 -- -- -- -- -- -- 28 2 L/min Nasal cannula -- -- --    09/29/24 10:46:42 97.9 °F (36.6 °C) 82 -- 119/75 90 91 % -- -- -- -- -- --    09/29/24 10:16:36 -- 89 -- 120/76 91 92 % -- -- -- -- -- --    09/29/24 0905 -- -- -- -- -- -- 32 3 L/min Nasal cannula -- -- Med Not Given for Pain - for MAR use only    09/29/24  09:02:51 -- 90 -- 122/78 93 92 % -- -- -- -- -- --    09/29/24 09:02:35 -- 90 -- 122/78 93 91 % -- -- -- -- -- --    09/29/24 0900 -- -- -- -- -- -- 32 3 L/min Nasal cannula -- -- No Pain    09/29/24 07:28:09 97.9 °F (36.6 °C) 86 23 119/78 92 92 % -- -- -- -- -- --    09/29/24 02:05:14 97.8 °F (36.6 °C) 77 -- 118/77 91 91 % -- -- -- -- -- --    09/28/24 22:05:28 97.9 °F (36.6 °C) 78 17 123/79 94 91 % -- -- -- Lying -- No Pain    09/28/24 2110 -- -- -- -- -- -- -- -- -- -- -- Med Not Given for Pain - for MAR use only    09/28/24 2100 -- -- -- -- -- 97 % 32 3 L/min Nasal cannula -- 14 --    09/28/24 19:17:40 97.8 °F (36.6 °C) 81 17 125/75 92 92 % -- -- -- Lying -- No Pain    09/28/24 15:13:44 98.1 °F (36.7 °C) 79 17 129/77 94 93 % -- -- -- Standing -- --    09/28/24 1354 -- -- -- -- -- -- -- -- -- -- -- Med Not Given for Pain - for MAR use only    09/28/24 11:05:27 98.7 °F (37.1 °C) 89 -- 137/78 98 92 % -- -- -- -- -- --    09/28/24 11:05:15 98.7 °F (37.1 °C) 91 17 137/78 98 92 % -- -- -- Lying -- --    09/28/24 0900 -- -- -- -- -- 92 % 36 4 L/min Nasal cannula -- 14 No Pain    09/28/24 07:36:15 97.6 °F (36.4 °C) 90 20 130/82 98 92 % -- -- -- -- -- --    09/28/24 03:08:11 98.2 °F (36.8 °C) 94 20 128/81 97 92 % -- -- -- Lying -- --    09/27/24 23:18:56 98.7 °F (37.1 °C) 98 20 127/76 93 88 % -- -- -- Lying -- --    09/27/24 2006 -- -- -- -- -- -- -- -- -- -- 15 No Pain    09/27/24 1957 98.2 °F (36.8 °C) 101 20 129/75 -- -- -- -- -- -- -- --    09/27/24 19:26:16 -- 101 -- 129/75 93 93 % -- -- -- -- -- No Pain              Pertinent Labs/Diagnostic Test Results:   Radiology:  No orders to display     Cardiology:  TTE with a EF of 65%, grade 1 diastolic dysfunction, and moderate pericardial effusion circumferential to the heart exhibiting no internal echoes and the largest diameter ring 1.5 cm; there is no echocardiographic evidence of tamponade however there is a small left pleural effusion         Results from last 7  days   Lab Units 09/28/24  0430 09/27/24  0033   WBC Thousand/uL 10.88* 12.63*   HEMOGLOBIN g/dL 12.0 13.1   HEMATOCRIT % 37.0 39.4   PLATELETS Thousands/uL 320 320   TOTAL NEUT ABS Thousands/µL 8.55* 9.87*     Results from last 7 days   Lab Units 09/28/24  0430 09/27/24  0033   SODIUM mmol/L 138 133*   POTASSIUM mmol/L 4.0 4.3   CHLORIDE mmol/L 101 100   CO2 mmol/L 28 23   ANION GAP mmol/L 9 10   BUN mg/dL 18 19   CREATININE mg/dL 1.03 1.03   EGFR ml/min/1.73sq m 51 51   CALCIUM mg/dL 8.8 9.1   MAGNESIUM mg/dL 1.9  --    PHOSPHORUS mg/dL 2.8  --      Results from last 7 days   Lab Units 09/28/24  0430   AST U/L 18   ALT U/L 15   ALK PHOS U/L 73   TOTAL PROTEIN g/dL 6.7   ALBUMIN g/dL 3.4*   TOTAL BILIRUBIN mg/dL 0.71         Results from last 7 days   Lab Units 09/28/24  0430 09/27/24  0033   GLUCOSE RANDOM mg/dL 99 133     Results from last 7 days   Lab Units 09/27/24  0033   PH TEMITOPE  7.581*   PCO2 TEMITOPE mm Hg 22.3*   PO2 TEMITOPE mm Hg 170.4*   HCO3 TEMITOPE mmol/L 20.5*   BASE EXC TEMITOPE mmol/L 0.4   O2 CONTENT TEMITOPE ml/dL 17.9   O2 HGB, VENOUS % 93.7*     Results from last 7 days   Lab Units 09/27/24  0428 09/27/24  0233 09/27/24  0033   HS TNI 0HR ng/L  --   --  <2   HS TNI 2HR ng/L  --  <2  --    HS TNI 4HR ng/L 3  --   --    HSTNI D4 ng/L >1  --   --      Results from last 7 days   Lab Units 09/27/24  0033   D-DIMER QUANTITATIVE ug/ml FEU 4.70*     Results from last 7 days   Lab Units 09/27/24  0033   PROTIME seconds 15.6*   INR  1.17   PTT seconds 34     Results from last 7 days   Lab Units 09/27/24  0033   TSH 3RD GENERATON uIU/mL 4.965*       Results from last 7 days   Lab Units 09/27/24  0159   LACTIC ACID mmol/L 1.1     Results from last 7 days   Lab Units 09/27/24  0033   BNP pg/mL 152*     Results from last 7 days   Lab Units 09/28/24  0430   CRP mg/L 262.8*   SED RATE mm/hour 73*     Results from last 7 days   Lab Units 09/27/24  0159   SALICYLATE LVL mg/dL <5       Past Medical History:   Diagnosis Date    Disease of  thyroid gland     Hyperlipidemia     Hypertension     Memory loss     Shingles     Visual impairment      Present on Admission:   Dementia without behavioral disturbance (HCC)   Pericardial effusion   Hypothyroidism   Stage 3a chronic kidney disease (HCC)   Hyperlipidemia   Benign essential HTN      Admitting Diagnosis: Pericardial effusion [I31.39]  Age/Sex: 79 y.o. female    Network Utilization Review Department  ATTENTION: Please call with any questions or concerns to [phone] and carefully listen to the prompts so that you are directed to the right person. All voicemails are confidential.   For Discharge needs, contact Care Management DC Support Team at [phone] opt. 2  Send all requests for admission clinical reviews, approved or denied determinations and any other requests to dedicated fax number below belonging to the campus where the patient is receiving treatment. List of dedicated fax numbers for the Facilities:  FACILITY NAME UR FAX NUMBER   ADMISSION DENIALS (Administrative/Medical Necessity) [phone]   DISCHARGE SUPPORT TEAM (NETWORK) [phone]   PARENT CHILD HEALTH (Maternity/NICU/Pediatrics) [phone]   [facility] [phone]   [facility] [phone]   [facility] [phone]   [facility] [phone]   [facility] [phone]   [facility] [phone]   [facility] [phone]   [facility] [phone]   [facility] [phone]   [facility] [phone]   [facility] [phone]   [facility] [phone]

## 2024-09-29 NOTE — PROGRESS NOTES
Progress Note - Internal Medicine   Name: Tanya Stevens 79 y.o. female I MRN: [id number]  Unit/Bed#: [facility] 504-01 I Date of Admission: 9/27/2024   Date of Service: 9/29/2024 I Hospital Day: 2    Assessment & Plan  Pericardial effusion  Presented to [place] with progressive dyspnea found to have moderate-sized pleural effusion post UTI  Echocardiogram showing preserved ejection fraction 65% grade 1 diastolic stock dysfunction moderate pericardial effusion circumferential with no evidence of tamponade -patient was subsequently transferred here  Seen by cardiology recommend medical therapy without evidence to warrant urgent thoracic surgery evaluation  Initiated on ibuprofen, colchicine by cardiology  Continue telemetry, pulse oximetry  Benign essential HTN  Renal function stable and being monitored continue verapamil  Hyperlipidemia  Continue statin  Hypothyroidism  TSH slightly elevated but free T4 also elevated continue current dose  Stage 3a chronic kidney disease (HCC)  Lab Results   Component Value Date    EGFR 51 09/28/2024    EGFR 51 09/27/2024    EGFR 52 12/06/2023    CREATININE 1.03 09/28/2024    CREATININE 1.03 09/27/2024    CREATININE 1.02 12/06/2023     Renal function currently at baseline  monitored closely as patient requires NSAIDs for above pericardial effusion as well as diuretics for volume overload  Dementia without behavioral disturbance (HCC)  Stable    Disposition: Remain in hospital for monitoring and observation and treatment of pericardial effusion and IV diuresis      History of Present Illness   Patient seen and examined sitting in bedside chair. No acute events overnight.  Patient states she is feeling generally fine and denies any chest pain or shortness of breath.    Objective     Vitals:    09/29/24 0902 09/29/24 1016 09/29/24 1046 09/29/24 1516   BP: 122/78 120/76 119/75 117/73   BP Location:    Right arm   Pulse: 90 89 82 78   Resp:    16   Temp:   97.9 °F (36.6 °C) (!) 97.4 °F (36.3  °C)   TempSrc:    Oral   SpO2: 92% 92% 91% 91%   Weight:       Height:          Temperature:   Temp (24hrs), Av.8 °F (36.6 °C), Min:97.4 °F (36.3 °C), Max:97.9 °F (36.6 °C)    Temperature: (!) 97.4 °F (36.3 °C)  Intake & Output:  I/O          07 0700  07 07 07 0700    P.O.   240    Total Intake(mL/kg)   240 (2.4)    Urine (mL/kg/hr)   379 (0.4)    Total Output   379    Net   -139           Unmeasured Urine Occurrence  3 x 1 x          Weights:   IBW (Ideal Body Weight): 50.1 kg    Body mass index is 40.97 kg/m².  Weight (last 2 days)       Date/Time Weight    24 102 (223.99)    24 19:26:16 102 (223.99)          Physical Exam  Constitutional:       General: She is not in acute distress.     Appearance: She is not ill-appearing.      Comments: Elderly female   HENT:      Mouth/Throat:      Mouth: Mucous membranes are moist.   Cardiovascular:      Rate and Rhythm: Normal rate and regular rhythm.      Pulses: Normal pulses.      Heart sounds: No murmur heard.  Pulmonary:      Effort: Pulmonary effort is normal. No respiratory distress.      Comments: Scattered wheezing  Abdominal:      General: Abdomen is flat.      Palpations: Abdomen is soft.   Neurological:      General: No focal deficit present.      Comments: Oriented x 2, demented           Lab Results: I have reviewed the following results:   Recent Labs     24  0033 24  0159 24  0233 24  0430   WBC 12.63*  --   --  10.88*   HGB 13.1  --   --  12.0   HCT 39.4  --   --  37.0     --   --  320   SODIUM 133*  --   --  138   K 4.3  --   --  4.0     --   --  101   CO2 23  --   --  28   BUN 19  --   --  18   CREATININE 1.03  --   --  1.03   GLUC 133  --   --  99   MG  --   --   --  1.9   PHOS  --   --   --  2.8   AST  --   --   --  18   ALT  --   --   --  15   ALB  --   --   --  3.4*   TBILI  --   --   --  0.71   ALKPHOS  --   --   --  73   PTT 34  --   --   --    INR  1.17  --   --   --    HSTNI0 <2  --   --   --    HSTNI2  --   --  <2  --    *  --   --   --    LACTICACID  --  1.1  --   --      Imaging Review: Reviewed radiology reports from this admission including: Echocardiogram.  Other Studies: No additional pertinent studies reviewed.    Currently Ordered Meds:   Current Facility-Administered Medications:     colchicine (COLCRYS) tablet 0.6 mg, BID    furosemide (LASIX) injection 40 mg, BID (diuretic)    heparin (porcine) subcutaneous injection 5,000 Units, Q8H DANIELITO **AND** Platelet count, Once    ibuprofen (MOTRIN) tablet 600 mg, BID    levothyroxine tablet 50 mcg, Early Morning    pantoprazole (PROTONIX) EC tablet 40 mg, Early Morning    pravastatin (PRAVACHOL) tablet 40 mg, Daily With Dinner    [COMPLETED] Insert peripheral IV, Once **AND** sodium chloride (PF) 0.9 % injection 3 mL, Q1H PRN    verapamil (CALAN-SR) CR tablet 180 mg, Daily  VTE Pharmacologic Prophylaxis: Heparin    Portions of the record may have been created with voice recognition software.

## 2024-09-29 NOTE — PLAN OF CARE
Problem: Knowledge Deficit  Goal: Patient/family/caregiver demonstrates understanding of disease process, treatment plan, medications, and discharge instructions  Description: Complete learning assessment and assess knowledge base.  Interventions:  - Provide teaching at level of understanding  - Provide teaching via preferred learning methods  Outcome: Progressing     Problem: METABOLIC, FLUID AND ELECTROLYTES - ADULT  Goal: Fluid balance maintained  Description: INTERVENTIONS:  - Monitor labs   - Monitor I/O and WT  - Instruct patient on fluid and nutrition as appropriate  - Assess for signs & symptoms of volume excess or deficit  Outcome: Progressing

## 2024-09-29 NOTE — PROGRESS NOTES
"Cardiology Progress Note   Tanya Stevens 79 y.o. female MRN: [id number]    Unit/Bed#: [facility] 504-01 Encounter: [id number]      ASSESSMENT/PLAN:  Pericardial effusion  Presented to [facility] with dyspnea x 1 week. Transferred to [facility] for evaluation and possible thoracic surgery intervention.   CT PE study: No PE.  Large pericardial effusion, small bilateral pleural effusions, incidental thyroid nodule, large hiatal hernia.  TTE: EF 65%, G1 DD, AV sclerosis, ascending aorta measures 3.8 cm, moderate pericardial effusion circumferential to the heart fluid exhibits no internal echoes, largest diameter 1.5 cm no echo evidence of tamponade there is a small left pleural effusion.  Hemodynamically stable, BP normotensive since admission  EKG: NSR, possible inferior infarct, no ST elevation or KS depression noted.  CBC: Mild leukocytosis -- WBC 10.88, otherwise normal  BMP: Normal  TSH -- 4.965, T4 -- 1.55  CRP -- 262.8, ESR -- 73  Troponins: <2/<2/<2    Telemetry: No events, SR, HR 70-80s  IV lasix 20mg x1 given yest AM -- minimal net output -85mL overnight  Hypertension  Hyperlipidemia   Hypothyriodism  CKD 3a  Iron deficiency anemia  B12 deficiency  Dementia      -- Hemodynamically stable without urgent need for thoracic surgery intervention  -- Suspect viral/infectious etiology of her pericardial effusion given mild leukocytosis and elevated CRP and ESR  -- Started on Colchicine 0.6mg BID and Ibuprofen 600mg BID  -- Defer adjustment to her Levothyroxine to primary team      Subjective:   Patient seen and examined. Son Pankaj at bedside. No chest discomfort or shortness of breath. Persistent coughing/wheezing without production. Remains on O2 via NC @ 3L.     Objective:     Vitals: Blood pressure 119/78, pulse 86, temperature 97.9 °F (36.6 °C), resp. rate (!) 23, height 5' 2\" (1.575 m), weight 102 kg (223 lb 15.8 oz), SpO2 92%., Body mass index is 40.97 kg/m².,   Orthostatic Blood Pressures      Flowsheet Row Most " Recent Value   Blood Pressure 119/78 filed at 09/29/2024 0728   Patient Position - Orthostatic VS Lying filed at 09/28/2024 2205              Intake/Output Summary (Last 24 hours) at 9/29/2024 0849  Last data filed at 9/29/2024 0607  Gross per 24 hour   Intake 720 ml   Output 805 ml   Net -85 ml         Physical Exam:    GEN: Tanya Stevens appears well, alert and oriented x 3, pleasant and cooperative   HEENT: Sclera anicteric, conjunctivae pink, mucous membranes moist. Oropharynx clear.   NECK: supple, no significant JVD, Trachea midline, no thyromegaly.   HEART: regular rhythm, normal S1 and S2, no murmurs, clicks, gallops or rubs   LUNGS: +scattered wheezes. clear to auscultation bilaterally; no  rales, or rhonchi. No increased work of breathing or signs of respiratory distress.   ABDOMEN: Soft, nontender, nondistended  EXTREMITIES: Skin warm and well perfused, no clubbing, cyanosis, or edema.  NEURO: No focal findings. Normal speech. Mood and affect normal.   SKIN: Normal without suspicious lesions on exposed skin.      Medications:      Current Facility-Administered Medications:     colchicine (COLCRYS) tablet 0.6 mg, 0.6 mg, Oral, BID, Juan Nuñez MD, 0.6 mg at 09/28/24 1728    heparin (porcine) subcutaneous injection 5,000 Units, 5,000 Units, Subcutaneous, Q8H DANIELITO, 5,000 Units at 09/29/24 0555 **AND** Platelet count, , , Once, Víctor Sanchez MD    ibuprofen (MOTRIN) tablet 600 mg, 600 mg, Oral, BID, Juan Nuñez MD, 600 mg at 09/28/24 2110    levothyroxine tablet 50 mcg, 50 mcg, Oral, Early Morning, Víctor Sanchez MD, 50 mcg at 09/29/24 0555    pantoprazole (PROTONIX) EC tablet 40 mg, 40 mg, Oral, Early Morning, Víctor Sanchez MD, 40 mg at 09/29/24 0555    pravastatin (PRAVACHOL) tablet 40 mg, 40 mg, Oral, Daily With Dinner, Víctor Sanchez MD, 40 mg at 09/28/24 1728    [COMPLETED] Insert peripheral IV, , , Once **AND** sodium chloride (PF) 0.9 % injection 3 mL, 3 mL, Intravenous, Q1H PRN, Víctor  MD Laura    verapamil (CALAN-SR) CR tablet 180 mg, 180 mg, Oral, Daily, Víctor Sanchez MD, 180 mg at 09/28/24 0902     Labs & Results:        Results from last 7 days   Lab Units 09/28/24  0430 09/27/24  0033   WBC Thousand/uL 10.88* 12.63*   HEMOGLOBIN g/dL 12.0 13.1   HEMATOCRIT % 37.0 39.4   PLATELETS Thousands/uL 320 320         Results from last 7 days   Lab Units 09/28/24  0430 09/27/24  0033   POTASSIUM mmol/L 4.0 4.3   CHLORIDE mmol/L 101 100   CO2 mmol/L 28 23   BUN mg/dL 18 19   CREATININE mg/dL 1.03 1.03   CALCIUM mg/dL 8.8 9.1   ALK PHOS U/L 73  --    ALT U/L 15  --    AST U/L 18  --      Results from last 7 days   Lab Units 09/27/24  0033   INR  1.17   PTT seconds 34     Results from last 7 days   Lab Units 09/28/24  0430   MAGNESIUM mg/dL 1.9

## 2024-09-30 LAB
ANION GAP SERPL CALCULATED.3IONS-SCNC: 10 MMOL/L (ref 4–13)
BASOPHILS # BLD AUTO: 0.02 THOUSANDS/ÂΜL (ref 0–0.1)
BASOPHILS NFR BLD AUTO: 0 % (ref 0–1)
BUN SERPL-MCNC: 28 MG/DL (ref 5–25)
CALCIUM SERPL-MCNC: 8.1 MG/DL (ref 8.4–10.2)
CHLORIDE SERPL-SCNC: 98 MMOL/L (ref 96–108)
CO2 SERPL-SCNC: 27 MMOL/L (ref 21–32)
CREAT SERPL-MCNC: 1.18 MG/DL (ref 0.6–1.3)
EOSINOPHIL # BLD AUTO: 0.18 THOUSAND/ÂΜL (ref 0–0.61)
EOSINOPHIL NFR BLD AUTO: 2 % (ref 0–6)
ERYTHROCYTE [DISTWIDTH] IN BLOOD BY AUTOMATED COUNT: 12.9 % (ref 11.6–15.1)
GFR SERPL CREATININE-BSD FRML MDRD: 43 ML/MIN/1.73SQ M
GLUCOSE SERPL-MCNC: 110 MG/DL (ref 65–140)
HCT VFR BLD AUTO: 35.8 % (ref 34.8–46.1)
HGB BLD-MCNC: 11.9 G/DL (ref 11.5–15.4)
IMM GRANULOCYTES # BLD AUTO: 0.07 THOUSAND/UL (ref 0–0.2)
IMM GRANULOCYTES NFR BLD AUTO: 1 % (ref 0–2)
LYMPHOCYTES # BLD AUTO: 1.37 THOUSANDS/ÂΜL (ref 0.6–4.47)
LYMPHOCYTES NFR BLD AUTO: 14 % (ref 14–44)
MCH RBC QN AUTO: 30.1 PG (ref 26.8–34.3)
MCHC RBC AUTO-ENTMCNC: 33.2 G/DL (ref 31.4–37.4)
MCV RBC AUTO: 90 FL (ref 82–98)
MONOCYTES # BLD AUTO: 1.17 THOUSAND/ÂΜL (ref 0.17–1.22)
MONOCYTES NFR BLD AUTO: 12 % (ref 4–12)
NEUTROPHILS # BLD AUTO: 6.89 THOUSANDS/ÂΜL (ref 1.85–7.62)
NEUTS SEG NFR BLD AUTO: 71 % (ref 43–75)
NRBC BLD AUTO-RTO: 0 /100 WBCS
PLATELET # BLD AUTO: 382 THOUSANDS/UL (ref 149–390)
PMV BLD AUTO: 9.2 FL (ref 8.9–12.7)
POTASSIUM SERPL-SCNC: 3.3 MMOL/L (ref 3.5–5.3)
RBC # BLD AUTO: 3.96 MILLION/UL (ref 3.81–5.12)
SODIUM SERPL-SCNC: 135 MMOL/L (ref 135–147)
WBC # BLD AUTO: 9.7 THOUSAND/UL (ref 4.31–10.16)

## 2024-09-30 PROCEDURE — 85025 COMPLETE CBC W/AUTO DIFF WBC: CPT | Performed by: INTERNAL MEDICINE

## 2024-09-30 PROCEDURE — 94760 N-INVAS EAR/PLS OXIMETRY 1: CPT

## 2024-09-30 PROCEDURE — 94640 AIRWAY INHALATION TREATMENT: CPT

## 2024-09-30 PROCEDURE — 99232 SBSQ HOSP IP/OBS MODERATE 35: CPT | Performed by: INTERNAL MEDICINE

## 2024-09-30 PROCEDURE — 80048 BASIC METABOLIC PNL TOTAL CA: CPT | Performed by: INTERNAL MEDICINE

## 2024-09-30 PROCEDURE — 94664 DEMO&/EVAL PT USE INHALER: CPT

## 2024-09-30 RX ORDER — ALBUTEROL SULFATE 0.83 MG/ML
2.5 SOLUTION RESPIRATORY (INHALATION) EVERY 6 HOURS PRN
Status: DISCONTINUED | OUTPATIENT
Start: 2024-09-30 | End: 2024-10-03 | Stop reason: HOSPADM

## 2024-09-30 RX ORDER — IPRATROPIUM BROMIDE AND ALBUTEROL SULFATE 2.5; .5 MG/3ML; MG/3ML
3 SOLUTION RESPIRATORY (INHALATION)
Status: DISCONTINUED | OUTPATIENT
Start: 2024-09-30 | End: 2024-09-30

## 2024-09-30 RX ORDER — POTASSIUM CHLORIDE 1500 MG/1
40 TABLET, EXTENDED RELEASE ORAL ONCE
Status: COMPLETED | OUTPATIENT
Start: 2024-09-30 | End: 2024-09-30

## 2024-09-30 RX ORDER — POTASSIUM CHLORIDE 1500 MG/1
20 TABLET, EXTENDED RELEASE ORAL ONCE
Status: COMPLETED | OUTPATIENT
Start: 2024-09-30 | End: 2024-09-30

## 2024-09-30 RX ADMIN — COLCHICINE 0.6 MG: 0.6 TABLET ORAL at 09:36

## 2024-09-30 RX ADMIN — PANTOPRAZOLE SODIUM 40 MG: 40 TABLET, DELAYED RELEASE ORAL at 05:10

## 2024-09-30 RX ADMIN — IPRATROPIUM BROMIDE AND ALBUTEROL SULFATE 3 ML: 2.5; .5 SOLUTION RESPIRATORY (INHALATION) at 10:32

## 2024-09-30 RX ADMIN — PRAVASTATIN SODIUM 40 MG: 40 TABLET ORAL at 17:46

## 2024-09-30 RX ADMIN — HEPARIN SODIUM 5000 UNITS: 5000 INJECTION INTRAVENOUS; SUBCUTANEOUS at 05:10

## 2024-09-30 RX ADMIN — COLCHICINE 0.6 MG: 0.6 TABLET ORAL at 17:46

## 2024-09-30 RX ADMIN — HEPARIN SODIUM 5000 UNITS: 5000 INJECTION INTRAVENOUS; SUBCUTANEOUS at 14:21

## 2024-09-30 RX ADMIN — VERAPAMIL HYDROCHLORIDE 180 MG: 180 TABLET ORAL at 09:37

## 2024-09-30 RX ADMIN — IBUPROFEN 600 MG: 600 TABLET, FILM COATED ORAL at 09:36

## 2024-09-30 RX ADMIN — POTASSIUM CHLORIDE 20 MEQ: 1500 TABLET, EXTENDED RELEASE ORAL at 17:46

## 2024-09-30 RX ADMIN — IBUPROFEN 600 MG: 600 TABLET, FILM COATED ORAL at 17:46

## 2024-09-30 RX ADMIN — LEVOTHYROXINE SODIUM 50 MCG: 50 TABLET ORAL at 05:10

## 2024-09-30 RX ADMIN — HEPARIN SODIUM 5000 UNITS: 5000 INJECTION INTRAVENOUS; SUBCUTANEOUS at 22:00

## 2024-09-30 RX ADMIN — POTASSIUM CHLORIDE 40 MEQ: 1500 TABLET, EXTENDED RELEASE ORAL at 06:21

## 2024-09-30 NOTE — DISCHARGE INSTR - OTHER ORDERS
Skin care plans:  1-Hydraguard to bilateral sacrum, buttock and heels BID and PRN  2-Elevate heels to offload pressure.  3-Ehob cushion in chair when out of bed.  4-Moisturize skin daily with skin nourishing cream.  5-Turn/reposition q2h or when medically stable for pressure re-distribution on skin.

## 2024-09-30 NOTE — WOUND OSTOMY CARE
Consult Note - Wound   Tanya Stevens 79 y.o. female MRN: [id number]  Unit/Bed#: [facility] 504-01 Encounter: [id number]        Assessment Findings:   Patient seen today for wound care consult.    Sacral/buttocks and B/L heels intact and blanching, preventative skin care orders placed.     Orders listed below and wound care will sign off, call or secure chat with questions. Bedside nurse updated of findings and orders. Photos below and in media.    Skin care plans:  1-Hydraguard to bilateral sacrum, buttock and heels BID and PRN  2-Elevate heels to offload pressure.  3-Ehob cushion in chair when out of bed.  4-Moisturize skin daily with skin nourishing cream.  5-Turn/reposition q2h or when medically stable for pressure re-distribution on skin.     Wounds:               Sasha GUERRERON, RN, CWOCN

## 2024-09-30 NOTE — ASSESSMENT & PLAN NOTE
Lab Results   Component Value Date    EGFR 43 09/30/2024    EGFR 51 09/28/2024    EGFR 51 09/27/2024    CREATININE 1.18 09/30/2024    CREATININE 1.03 09/28/2024    CREATININE 1.03 09/27/2024     Renal function stable currently in baseline range  monitored closely as patient requires NSAIDs for above pericardial effusion as well as diuretics for volume overload

## 2024-09-30 NOTE — RESTORATIVE TECHNICIAN NOTE
Restorative Technician Note      Patient Name: Tanya Stevens     [facility] Tech Visit Date: 09/30/24  Note Type: Mobility  Patient Position Upon Consult: Bedside chair  Activity Performed: Repositioned  Assistive Device: Other (Comment) (HHAx2)  Patient Position at End of Consult: Bedside chair; All needs within reach; Bed/Chair alarm activated

## 2024-09-30 NOTE — RESTORATIVE TECHNICIAN NOTE
Restorative Technician Note      Patient Name: Tanya Stevens     [facility] Tech Visit Date: 09/30/24  Note Type: Mobility  Patient Position Upon Consult: Supine  Activity Performed: Ambulated  Assistive Device: Other (Comment) (HHAx2)  Patient Position at End of Consult: Bedside chair; All needs within reach; Bed/Chair alarm activated

## 2024-09-30 NOTE — PROGRESS NOTES
General Cardiology   Progress Note -  Team One   Tanya Stevens 79 y.o. female MRN: [id number]    Unit/Bed#: [facility] 504-01 Encounter: [id number]    Assessment  1. Acute pericarditis.   2. Moderate sized pericardial effusion w/ no clinical or echo evidence of tamponade.  -Possible viral etiology.  -CRP -- 262.8, ESR -- 73  -On colchicine 0.6 mg BID and ibuprofen 600 mg twice daily  3. Preserved biventricular systolic function  4. Mild volume overload  -BNP level 152.  -TTE/27/24; LVEF 65%, grade 1 DD, RV normal size/systolic function, LA/RA normal in size, trace AI, moderate sized pericardial effusion circumferential to the heart.  -Outpatient diuretic regimen; none  -Inpatient diuretic regimen; IV furosemide 40 mg twice daily  -24-hour I&O balance; -150 mL  -Dry weight around 206 pounds.  SS weight 205 pounds this a.am  5. Hypertension  -Average /74 last recorded 122/71, HR 93.  -Outpatient BP regimen; verapamil 180 mg daily  -Inpatient BP regimen; verapamil 180 mg daily  6. Dyslipidemia   -Previously on simvastatin 10 mg daily, nonformulary switched to pravastatin 40 mg daily this admission.  -Lipid profile 11/9/2022; cholesterol 178, triglyceride 69, HDL 82 and LDL calculated 82.  7. CKD stage III  -Baseline creatinine around 1-1.1.  -Creatinine 1.03 on admission, currently 1.18.    Plan  -Pt denies any specific cardiac complaints this a.m.  Does feel mildly dyspneic with exertion.  On exam notable diffuse expiratory wheezing.  Currently requiring 2 L of supplemental O2.  No significant volume overload.   -DC IV diuretics.    -Continue colchicine -to complete a 3-month course. Ibuprofen taper over the few weeks (600 mg BID x 1 week, then 400 mg BID x 1 week, then 200 mg BID x 1 week) - will need close monitoring of symptoms. Repeat TTE in 2 weeks to reassess degree/size of pericardial effusion.   -BP well controlled/ Continue verapamil 180 mg daily.  -Continue statin.  -No indication for  "telemetry.    Subjective  Review of Systems   Constitutional: Negative for chills, fever and malaise/fatigue.   Eyes:  Negative for visual disturbance.   Cardiovascular:  Positive for dyspnea on exertion. Negative for chest pain, leg swelling, orthopnea and palpitations.   Respiratory:  Positive for cough and wheezing.    Gastrointestinal:  Negative for abdominal pain.   Neurological:  Negative for dizziness, headaches and light-headedness.       Objective:   Physical Exam  Vitals and nursing note reviewed.   Constitutional:       General: She is not in acute distress.     Appearance: She is not diaphoretic.   HENT:      Head: Normocephalic and atraumatic.   Eyes:      General: No scleral icterus.  Cardiovascular:      Rate and Rhythm: Normal rate and regular rhythm.      Pulses: Normal pulses.      Heart sounds: Normal heart sounds.      No friction rub.   Pulmonary:      Effort: Pulmonary effort is normal.      Breath sounds: Wheezing present. No rales.   Abdominal:      Palpations: Abdomen is soft.   Musculoskeletal:      Right lower leg: No edema.      Left lower leg: No edema.   Skin:     General: Skin is warm and dry.      Capillary Refill: Capillary refill takes less than 2 seconds.   Neurological:      General: No focal deficit present.      Mental Status: She is alert and oriented to person, place, and time.   Psychiatric:         Mood and Affect: Mood normal.         Vitals: Blood pressure 122/71, pulse 93, temperature 98 °F (36.7 °C), resp. rate 18, height 5' 2\" (1.575 m), weight 93.2 kg (205 lb 7.5 oz), SpO2 95%.,     Body mass index is 37.58 kg/m².,   Systolic (24hrs), Av , Min:117 , Max:125     Diastolic (24hrs), Av, Min:67, Max:78      Intake/Output Summary (Last 24 hours) at 2024 0821  Last data filed at 2024 1431  Gross per 24 hour   Intake --   Output 150 ml   Net -150 ml     Weight (last 2 days)       Date/Time Weight    24 0457 93.2 (205.47)            LABORATORY " "RESULTS      CBC with diff:   Results from last 7 days   Lab Units 09/30/24  0430 09/28/24 0430 09/27/24  0033   WBC Thousand/uL 9.70 10.88* 12.63*   HEMOGLOBIN g/dL 11.9 12.0 13.1   HEMATOCRIT % 35.8 37.0 39.4   MCV fL 90 93 91   PLATELETS Thousands/uL 382 320 320   RBC Million/uL 3.96 4.00 4.34   MCH pg 30.1 30.0 30.2   MCHC g/dL 33.2 32.4 33.2   RDW % 12.9 13.2 13.2   MPV fL 9.2 9.5 9.7   NRBC AUTO /100 WBCs 0 0 0       CMP:  Results from last 7 days   Lab Units 09/30/24 0430 09/28/24 0430 09/27/24  0033   POTASSIUM mmol/L 3.3* 4.0 4.3   CHLORIDE mmol/L 98 101 100   CO2 mmol/L 27 28 23   BUN mg/dL 28* 18 19   CREATININE mg/dL 1.18 1.03 1.03   CALCIUM mg/dL 8.1* 8.8 9.1   AST U/L  --  18  --    ALT U/L  --  15  --    ALK PHOS U/L  --  73  --    EGFR ml/min/1.73sq m 43 51 51       BMP:  Results from last 7 days   Lab Units 09/30/24 0430 09/28/24 0430 09/27/24  0033   POTASSIUM mmol/L 3.3* 4.0 4.3   CHLORIDE mmol/L 98 101 100   CO2 mmol/L 27 28 23   BUN mg/dL 28* 18 19   CREATININE mg/dL 1.18 1.03 1.03   CALCIUM mg/dL 8.1* 8.8 9.1       No results found for: \"NTBNP\"     Results from last 7 days   Lab Units 09/28/24  0430   MAGNESIUM mg/dL 1.9             Results from last 7 days   Lab Units 09/27/24  0033   TSH 3RD GENERATON uIU/mL 4.965*   FREE T4 ng/dL 1.55*       Results from last 7 days   Lab Units 09/27/24  0033   INR  1.17       Lipid Profile:   Lab Results   Component Value Date    CHOL 268 (H) 01/12/2016    CHOL 198 01/13/2014     Lab Results   Component Value Date    HDL 82 11/09/2022    HDL 79 04/11/2022    HDL 99 01/14/2020     Lab Results   Component Value Date    LDLCALC 82 11/09/2022    LDLCALC 105 (H) 04/11/2022    LDLCALC 115 (H) 01/14/2020     Lab Results   Component Value Date    TRIG 69 11/09/2022    TRIG 61 04/11/2022    TRIG 46 01/14/2020       Cardiac testing:   No results found for this or any previous visit.    No results found for this or any previous visit.    No results found for this " or any previous visit.    No valid procedures specified.  No results found for this or any previous visit.      Meds/Allergies   all current active meds have been reviewed and current meds:   Current Facility-Administered Medications:     colchicine (COLCRYS) tablet 0.6 mg, BID    furosemide (LASIX) injection 40 mg, BID (diuretic)    heparin (porcine) subcutaneous injection 5,000 Units, Q8H DANIELITO **AND** Platelet count, Once    ibuprofen (MOTRIN) tablet 600 mg, BID    levothyroxine tablet 50 mcg, Early Morning    pantoprazole (PROTONIX) EC tablet 40 mg, Early Morning    potassium chloride (Klor-Con M20) CR tablet 20 mEq, Once    pravastatin (PRAVACHOL) tablet 40 mg, Daily With Dinner    [COMPLETED] Insert peripheral IV, Once **AND** sodium chloride (PF) 0.9 % injection 3 mL, Q1H PRN    verapamil (CALAN-SR) CR tablet 180 mg, Daily         Counseling / Coordination of Care  Total floor / unit time spent today 20 minutes.  Greater than 50% of total time was spent with the patient and / or family counseling and / or coordination of care.      ** Please Note: Dragon 360 Dictation voice to text software may have been used in the creation of this document. **

## 2024-09-30 NOTE — ASSESSMENT & PLAN NOTE
Presented to [facility] with progressive dyspnea found to have moderate-sized pleural effusion post UTI  Echocardiogram showing preserved ejection fraction 65% grade 1 diastolic stock dysfunction moderate pericardial effusion circumferential with no evidence of tamponade -patient was subsequently transferred here  Seen by cardiology recommend medical therapy without evidence to warrant urgent thoracic surgery evaluation  Initiated on ibuprofen, noted recommended taper over the few weeks (600 mg BID x 1 week, then 400 mg BID x 1 week, then 200 mg BID x 1 week) - this will strongly depend on trend of patient's renal function  colchicine by cardiology  Continue telemetry, pulse oximetry

## 2024-09-30 NOTE — PROGRESS NOTES
Progress Note - Internal Medicine   Name: Tanya Stevens 79 y.o. female I MRN: [id number]  Unit/Bed#: [facility] 504-01 I Date of Admission: 9/27/2024   Date of Service: 9/30/2024 I Hospital Day: 3    Assessment & Plan  Pericardial effusion  Presented to [facility] with progressive dyspnea found to have moderate-sized pleural effusion post UTI  Echocardiogram showing preserved ejection fraction 65% grade 1 diastolic stock dysfunction moderate pericardial effusion circumferential with no evidence of tamponade -patient was subsequently transferred here  Seen by cardiology recommend medical therapy without evidence to warrant urgent thoracic surgery evaluation  Initiated on ibuprofen, colchicine by cardiology  Continue telemetry, pulse oximetry  Benign essential HTN  BP stable and being monitored continue verapamil  Hyperlipidemia  Continue statin  Hypothyroidism  TSH slightly elevated but free T4 also elevated continue current dose  Stage 3a chronic kidney disease (HCC)  Lab Results   Component Value Date    EGFR 43 09/30/2024    EGFR 51 09/28/2024    EGFR 51 09/27/2024    CREATININE 1.18 09/30/2024    CREATININE 1.03 09/28/2024    CREATININE 1.03 09/27/2024     Renal function stable currently in baseline range  monitored closely as patient requires NSAIDs for above pericardial effusion as well as diuretics for volume overload  Dementia without behavioral disturbance (HCC)  Stable    Disposition: Remain in hospital for monitoring and observation and treatment of pericardial effusion and IV diuresis      History of Present Illness   Patient seen and examined sitting in bedside chair. No acute events overnight.  Patient states she is feeling generally fine and denies any chest pain or shortness of breath. Saw cardiology team earlier and had breathing treatment but does not recall these events.    Objective     Vitals:    09/30/24 0457 09/30/24 0736 09/30/24 0937 09/30/24 0939   BP:  122/71 120/72 120/72   BP Location:  Right  arm     Pulse:  93  97   Resp:  18     Temp:  98 °F (36.7 °C)     TempSrc:  Oral     SpO2:  95%  94%   Weight: 93.2 kg (205 lb 7.5 oz)      Height:          Temperature:   Temp (24hrs), Av.7 °F (36.5 °C), Min:97.4 °F (36.3 °C), Max:98 °F (36.7 °C)    Temperature: 98 °F (36.7 °C)  Intake & Output:  I/O          07 07 07 07 07 0700    P.O.   240    Total Intake(mL/kg)   240 (2.4)    Urine (mL/kg/hr)   379 (0.4)    Total Output   379    Net   -139           Unmeasured Urine Occurrence  3 x 1 x          Weights:   IBW (Ideal Body Weight): 50.1 kg    Body mass index is 37.58 kg/m².  Weight (last 2 days)       Date/Time Weight    24 0457 93.2 (205.47)          Physical Exam  Constitutional:       General: She is not in acute distress.     Appearance: She is not ill-appearing.      Comments: Elderly female   HENT:      Mouth/Throat:      Mouth: Mucous membranes are moist.   Cardiovascular:      Rate and Rhythm: Normal rate and regular rhythm.      Pulses: Normal pulses.      Heart sounds: No murmur heard.  Pulmonary:      Effort: Pulmonary effort is normal. No respiratory distress.      Comments: Mild diffuse expiratory wheeze  Abdominal:      General: Abdomen is flat.      Palpations: Abdomen is soft.   Neurological:      General: No focal deficit present.      Comments: Oriented x1, severe short term memory deficit           Lab Results: I have reviewed the following results:   Recent Labs     24  0430 24  0430   WBC 10.88* 9.70   HGB 12.0 11.9   HCT 37.0 35.8    382   SODIUM 138 135   K 4.0 3.3*    98   CO2 28 27   BUN 18 28*   CREATININE 1.03 1.18   GLUC 99 110   MG 1.9  --    PHOS 2.8  --    AST 18  --    ALT 15  --    ALB 3.4*  --    TBILI 0.71  --    ALKPHOS 73  --      Imaging Review: Reviewed radiology reports from this admission including: Echocardiogram.  Other Studies: No additional pertinent studies reviewed.    Currently  Ordered Meds:   Current Facility-Administered Medications:     albuterol inhalation solution 2.5 mg, Q6H PRN    colchicine (COLCRYS) tablet 0.6 mg, BID    heparin (porcine) subcutaneous injection 5,000 Units, Q8H DANIELITO **AND** Platelet count, Once    ibuprofen (MOTRIN) tablet 600 mg, BID    levothyroxine tablet 50 mcg, Early Morning    pantoprazole (PROTONIX) EC tablet 40 mg, Early Morning    potassium chloride (Klor-Con M20) CR tablet 20 mEq, Once    pravastatin (PRAVACHOL) tablet 40 mg, Daily With Dinner    [COMPLETED] Insert peripheral IV, Once **AND** sodium chloride (PF) 0.9 % injection 3 mL, Q1H PRN    verapamil (CALAN-SR) CR tablet 180 mg, Daily  VTE Pharmacologic Prophylaxis: Heparin    Portions of the record may have been created with voice recognition software.

## 2024-10-01 PROBLEM — E87.6 HYPOKALEMIA: Status: ACTIVE | Noted: 2024-10-01

## 2024-10-01 PROBLEM — D72.829 LEUKOCYTOSIS: Status: ACTIVE | Noted: 2024-10-01

## 2024-10-01 LAB
ANION GAP SERPL CALCULATED.3IONS-SCNC: 9 MMOL/L (ref 4–13)
BUN SERPL-MCNC: 24 MG/DL (ref 5–25)
CALCIUM SERPL-MCNC: 8.5 MG/DL (ref 8.4–10.2)
CHLORIDE SERPL-SCNC: 100 MMOL/L (ref 96–108)
CO2 SERPL-SCNC: 27 MMOL/L (ref 21–32)
CREAT SERPL-MCNC: 1.18 MG/DL (ref 0.6–1.3)
GFR SERPL CREATININE-BSD FRML MDRD: 43 ML/MIN/1.73SQ M
GLUCOSE SERPL-MCNC: 104 MG/DL (ref 65–140)
POTASSIUM SERPL-SCNC: 3.9 MMOL/L (ref 3.5–5.3)
SODIUM SERPL-SCNC: 136 MMOL/L (ref 135–147)

## 2024-10-01 PROCEDURE — 97167 OT EVAL HIGH COMPLEX 60 MIN: CPT

## 2024-10-01 PROCEDURE — 80048 BASIC METABOLIC PNL TOTAL CA: CPT | Performed by: INTERNAL MEDICINE

## 2024-10-01 PROCEDURE — 97163 PT EVAL HIGH COMPLEX 45 MIN: CPT

## 2024-10-01 PROCEDURE — 99232 SBSQ HOSP IP/OBS MODERATE 35: CPT | Performed by: INTERNAL MEDICINE

## 2024-10-01 RX ADMIN — HEPARIN SODIUM 5000 UNITS: 5000 INJECTION INTRAVENOUS; SUBCUTANEOUS at 06:16

## 2024-10-01 RX ADMIN — COLCHICINE 0.6 MG: 0.6 TABLET ORAL at 18:11

## 2024-10-01 RX ADMIN — HEPARIN SODIUM 5000 UNITS: 5000 INJECTION INTRAVENOUS; SUBCUTANEOUS at 14:02

## 2024-10-01 RX ADMIN — HEPARIN SODIUM 5000 UNITS: 5000 INJECTION INTRAVENOUS; SUBCUTANEOUS at 20:42

## 2024-10-01 RX ADMIN — PANTOPRAZOLE SODIUM 40 MG: 40 TABLET, DELAYED RELEASE ORAL at 06:41

## 2024-10-01 RX ADMIN — IBUPROFEN 600 MG: 600 TABLET, FILM COATED ORAL at 18:11

## 2024-10-01 RX ADMIN — LEVOTHYROXINE SODIUM 50 MCG: 50 TABLET ORAL at 06:41

## 2024-10-01 RX ADMIN — PRAVASTATIN SODIUM 40 MG: 40 TABLET ORAL at 18:11

## 2024-10-01 RX ADMIN — COLCHICINE 0.6 MG: 0.6 TABLET ORAL at 09:08

## 2024-10-01 RX ADMIN — VERAPAMIL HYDROCHLORIDE 180 MG: 180 TABLET ORAL at 09:08

## 2024-10-01 RX ADMIN — IBUPROFEN 600 MG: 600 TABLET, FILM COATED ORAL at 09:09

## 2024-10-01 NOTE — PLAN OF CARE
Problem: OCCUPATIONAL THERAPY ADULT  Goal: Performs self-care activities at highest level of function for planned discharge setting.  See evaluation for individualized goals.  Description: Treatment Interventions: ADL retraining, Functional transfer training, Endurance training, Energy conservation, Activityengagement, Cognitive reorientation, Compensatory technique education          See flowsheet documentation for full assessment, interventions and recommendations.   Note: Limitation: Decreased ADL status, Decreased endurance, Decreased high-level ADLs  Prognosis: Good  Assessment: Pt is a 79 y.o. female  admitted 9/27/24 w pericardial effusion, which is being managed medically at this time. Pt w active OT eval and treat orders. PMH includes  has a past medical history of Disease of thyroid gland, Hyperlipidemia, Hypertension, Memory loss, Shingles, and Visual impairment. Pt lives w spouse in a 1 sh w 1 sherif, has tu shower with grab bars and shower chair, raised toilet. Pta, pt was independent w/ ADL/IADL and functional mobility, was not driving. Currently, pt is Supervision for UB ADL, Min Ax1 for LB ADL, and completed transfers/FM w Min Ax1. Pt is limited at this time 2* decreased endurance/activtiy tolerance, decreased cognition, decreased ADL/High-level ADL status, decreased self-care trans, decreased safety awareness, limited home support and is a fall risk. This impacts pt's ability to complete UB and LB dressing and bathing, toileting, transfers, functional mobility, community mobility, home and health maintenance, and safe engagement in typical daily routine. The patient's raw score on the AM-PAC Daily Activity inpatient short form is 21, standardized score is 44.27, greater than 39.4. Patients at this level are likely to benefit from discharge to home. Please refer to the recommendation of the Occupational Therapist for safe discharge planning. From OT standpoint, pt should D/C to home w no needs when  medically stable. Pt will benefit from continued acute OT services 2-3 x/wk for 10-14 days to meet goals.     Rehab Resource Intensity Level, OT: No post-acute rehabilitation needs

## 2024-10-01 NOTE — PROGRESS NOTES
Progress Note - Hospitalist   Name: Tanya Stevens 79 y.o. female I MRN: [id number]  Unit/Bed#: [facility] 504-01 I Date of Admission: 9/27/2024   Date of Service: 10/1/2024 I Hospital Day: 4    Assessment & Plan  Pericardial effusion  Initially presented to the [facility] with progressive dyspnea found to have moderate-sized pleural effusion status post a recent viral URI  Echocardiogram showing preserved ejection fraction 65% grade-1 diastolic stock dysfunction moderate pericardial effusion circumferential with no evidence of tamponade -> transferred here to the [facility]  Appreciate cardiology input -> initiated on a long-term Colchicine course (6 months) along with an ongoing Ibuprofen tapering course (600 mg BID x 1 week - 400 mg BID x 1 week - 200 mg BID x 1 week)  Cardiology recommending repeat echocardiogram in 2 weeks  Anticipate discharge in 24-48 hours   Benign essential HTN  Continue Calan-SR  Low-sodium diet  Hyperlipidemia  Continue statin  Hypothyroidism  Continue Synthroid  Stage 3a chronic kidney disease (HCC)  Baseline creatinine of approximately 1.0-1.1 - currently stable at 1.18  Monitor renal function and urine output  Limit/avoid nephrotoxins and hypotension as possible -> note ongoing NSAID use  Dementia without behavioral disturbance (HCC)  Progressive dementia history  Supportive care  Leukocytosis  Likely reactive due to acute medical issue(s)  Monitor WBC count  Hypokalemia  Normalized status post repletion      VTE Pharmacologic Prophylaxis: VTE Score: 5 High Risk (Score >/= 5) - Pharmacological DVT Prophylaxis Ordered: Heparin. Sequential Compression Devices Ordered.    AM-PAC Basic Mobility:  Basic Mobility Inpatient Raw Score: 17  JH-HLM Goal: 5: Stand one or more mins  JH-HLM Achieved: 7: Walk 25 feet or more  JH-HLM Goal achieved. Continue to encourage appropriate mobility.    Patient Centered Rounds:  I have performed bedside rounds and discussed plan of care with nursing  today.  Discussions with Specialists or Other Care Team Provider:  see above assessments if applicable    Education and Discussions with Family / Patient: Patient, along with , at bedside today    Time Spent for Care:  35 minutes. More than 50% of total time spent on counseling and coordination of care, on one or more of the following: performing physical exam; counseling and coordination of care, obtaining or reviewing history, documenting in the medical record, reviewing/ordering tests/medications/procedures, and communicating with other healthcare professionals.    Current Length of Stay: 4 day(s)  Current Patient Status: Inpatient   Certification Statement:  Patient will continue to require additional hospital stay due to assessments as noted above.    Code Status: Level 1 - Full Code      Subjective     Encountered earlier in the day.  Sitting up in a chair attempting to consume a meal.  Per  at bedside, she endorses some intermittent coughing with attempts to eat.  Patient reports some mild improvement in shortness of breath.      Objective     Vitals:   Temp (24hrs), Av.9 °F (36.6 °C), Min:97.5 °F (36.4 °C), Max:98 °F (36.7 °C)    Temp:  [97.5 °F (36.4 °C)-98 °F (36.7 °C)] 97.8 °F (36.6 °C)  HR:  [88-97] 88  Resp:  [16-20] 16  BP: (112-148)/() 148/107  SpO2:  [91 %-96 %] 91 %  Body mass index is 37.58 kg/m².     Input and Output Summary (last 24 hours):       Intake/Output Summary (Last 24 hours) at 10/1/2024 1813  Last data filed at 10/1/2024 1400  Gross per 24 hour   Intake 1060 ml   Output 1600 ml   Net -540 ml       Physical Exam:     GENERAL Mildly weak/fatigued   HEAD   Normocephalic - atraumatic   EYES Nonicteric   MOUTH   Mucosa moist   NECK   Supple - full range of motion   CARDIAC   Regular rate/rhythm - S1/S2 positive   PULMONARY Mildly diminished bilaterally with scant expiratory wheezing   ABDOMEN Nontender/nondistended   MUSCULOSKELETAL   Motor strength/range of motion  mildly deconditioned   NEUROLOGIC Cognitive impairment present   SKIN   Chronic wrinkles/blemishes    PSYCHIATRIC   Mood/affect stable         Labs & Recent Cultures:  Results from last 7 days   Lab Units 09/30/24  0430   WBC Thousand/uL 9.70   HEMOGLOBIN g/dL 11.9   HEMATOCRIT % 35.8   PLATELETS Thousands/uL 382   SEGS PCT % 71   LYMPHO PCT % 14   MONO PCT % 12   EOS PCT % 2     Results from last 7 days   Lab Units 10/01/24  0111 09/30/24  0430 09/28/24  0430   POTASSIUM mmol/L 3.9   < > 4.0   CHLORIDE mmol/L 100   < > 101   CO2 mmol/L 27   < > 28   BUN mg/dL 24   < > 18   CREATININE mg/dL 1.18   < > 1.03   CALCIUM mg/dL 8.5   < > 8.8   ALK PHOS U/L  --   --  73   ALT U/L  --   --  15   AST U/L  --   --  18    < > = values in this interval not displayed.     Results from last 7 days   Lab Units 09/27/24  0033   INR  1.17             Results from last 7 days   Lab Units 09/27/24  0159   LACTIC ACID mmol/L 1.1           Lines/Drains/Telemetry:  Invasive Devices       Peripheral Intravenous Line  Duration             Peripheral IV 09/30/24 Left Wrist <1 day                  Telemetry Orders (From admission, onward)               24 Hour Telemetry Monitoring  Continuous x 24 Hours (Telem)        Question:  Reason for 24 Hour Telemetry  Answer:  Patients with ramu/xavier/endocarditis; cardiac contusion                     Last 24 Hours Medication List:   Current Facility-Administered Medications   Medication Dose Route Frequency Provider Last Rate    albuterol  2.5 mg Nebulization Q6H PRN Kali Guerrier MD      colchicine  0.6 mg Oral BID Juan Nuñez MD      heparin (porcine)  5,000 Units Subcutaneous Q8H [facility] Víctor Sanchez MD      ibuprofen  600 mg Oral BID Juna Nuñez MD      levothyroxine  50 mcg Oral Early Morning Víctor Sanchez MD      pantoprazole  40 mg Oral Early Morning Víctor Sanchez MD      pravastatin  40 mg Oral Daily With Dinner Víctor Sanchez MD      sodium chloride (PF)  3 mL Intravenous Q1H PRN  Víctor Sanchez MD      verapamil  180 mg Oral Daily MD EDENILSON Bernard MD   Hospitalist - [facility] Internal Medicine        ** Please Note:  Documentation is constructed using a voice recognition dictation system.  An occasional wrong word/phrase or “sound-a-like” substitution may have been picked up by dictation device due to the inherent limitations of voice recognition software.  Read the chart carefully and recognize, using reasonable context, where substitutions may have occurred.**

## 2024-10-01 NOTE — PLAN OF CARE
Problem: PHYSICAL THERAPY ADULT  Goal: Performs mobility at highest level of function for planned discharge setting.  See evaluation for individualized goals.  Description: Treatment/Interventions: ADL retraining, LE strengthening/ROM, Functional transfer training, Therapeutic exercise, Elevations, Endurance training, Patient/family training, Bed mobility, Gait training, Spoke to nursing, OT  Equipment Recommended: Walker       See flowsheet documentation for full assessment, interventions and recommendations.  Note: Prognosis: Good  Problem List: Decreased strength, Decreased endurance, Decreased mobility, Decreased safety awareness, Decreased skin integrity  Assessment: PT completed evaluation of 79 y.o. female admitted to [facility] on 9/27/2024 with Pericardial effusion.  Patient's current status instabilities include ongoing pain, multiple lines, continuous O2/HR monitoring, regression in function from baseline.  Patient  has a past medical history of Disease of thyroid gland, Hyperlipidemia, Hypertension, Memory loss, Shingles, and Visual impairment., CKD, and hx of PE. At baseline, pt resides with spouse in 1  with 1 [facility] and requires some assistance and uses RW prn prior to hospital admission. Patient with history of dementia, AO to self only. Patient spouse in the room throughout evaluation. Patient presents at time of PT evaluation functioning below baseline and currently w/ overall mobility deficits due to: impaired balance, decreased endurance, gait dysfunction, decreased activity tolerance and fall risk. During PT evaluation, patient currently is requiring supervision for bed mobility skills; for functional transfers and  for ambulation with RW. Patient performed supine to sit to edge of bed requiring use of bed rails and HOB elevated due to limited trunk/core strength. Patient ambulated 60' + 60' with RW, requiring occasional verbal cues for RW management and improving stride/step length.  Patient required ~5 min seated rest break due to LE weakness and decreased activity tolerance. Pt left OOB in bedside chair with alarm and all needs met. This patient is functioning below their baseline and is recommended for level III. Patient will benefit from continued skilled PT this admission to achieve maximal function and safety. The patients AM-PAC Basic Mobility Inpatient Short From Raw Score is 17 . Based on AM-PAC scoring and patient presentation, PT currently recommending Level III (Minimum Resource Intensity). Please also refer to the recommendation of the Physical Therapist for safe discharge planning.  Barriers to Discharge: None     Rehab Resource Intensity Level, PT: III (Minimum Resource Intensity)    See flowsheet documentation for full assessment.

## 2024-10-01 NOTE — OCCUPATIONAL THERAPY NOTE
Occupational Therapy Evaluation     Patient Name: Tanya Stevens  Today's Date: 10/1/2024  Problem List  Principal Problem:    Pericardial effusion  Active Problems:    Benign essential HTN    Hyperlipidemia    Hypothyroidism    Stage 3a chronic kidney disease (HCC)    Dementia without behavioral disturbance (HCC)    Past Medical History  Past Medical History:   Diagnosis Date    Disease of thyroid gland     Hyperlipidemia     Hypertension     Memory loss     Shingles     Visual impairment      Past Surgical History  Past Surgical History:   Procedure Laterality Date    BREAST BIOPSY Left     BREAST CYST EXCISION Right 1983    COLONOSCOPY      EYE SURGERY      JOINT REPLACEMENT      TOTAL HIP ARTHROPLASTY Bilateral     TOTAL KNEE ARTHROPLASTY Right     UPPER GASTROINTESTINAL ENDOSCOPY              10/01/24 0835   OT Last Visit   OT Visit Date 10/01/24   Note Type   Note type Evaluation   Pain Assessment   Pain Assessment Tool 0-10   Pain Score No Pain   Restrictions/Precautions   Weight Bearing Precautions Per Order No   Other Precautions Cognitive;Chair Alarm;Bed Alarm;Fall Risk   Home Living   Type of Home House   Home Layout One level;Stairs to enter with rails   Bathroom Shower/Tub Tub/shower unit   Bathroom Toilet Raised   Bathroom Equipment Grab bars in shower;Shower chair   Bathroom Accessibility Accessible   Home Equipment Walker  (prn)   Prior Function   Level of [place] Independent with functional mobility;Independent with ADLs;Independent with IADLS   Lives With Spouse   Receives Help From Family   IADLs Family/Friend/Other provides transportation;Independent with meal prep;Independent with medication management   Falls in the last 6 months 0   Vocational Retired   Lifestyle   Autonomy pta, pt was I W ADL,/iadl (spouse can A w med mgmt as needed). rw prn for mobility, - , spouse provides transport   Reciprocal Relationships supportive spouse and children   Service to Others retired    Intrinsic Gratification cooking   ADL   Where Assessed Edge of bed   Eating Assistance 6  Modified independent   Grooming Assistance 6  Modified Independent   UB Bathing Assistance 5  Supervision/Setup   LB Bathing Assistance 4  Minimal Assistance   UB Dressing Assistance 5  Supervision/Setup   LB Dressing Assistance 4  Minimal Assistance   Toileting Assistance  4  Minimal Assistance   Functional Assistance 4  Minimal Assistance   Bed Mobility   Supine to Sit 5  Supervision   Additional items Increased time required;Verbal cues   Additional Comments found in bed, left in chair w all needs in reach and alarm on.   Transfers   Sit to Stand 5  Supervision   Stand to Sit 5  Supervision   Additional Comments rw   Functional Mobility   Functional Mobility 4  Minimal assistance   Additional Comments ax1, household distance. progressed to S   Additional items Rolling walker   Balance   Static Sitting Good   Dynamic Sitting Fair +   Static Standing Fair   Dynamic Standing Fair -   Ambulatory Poor +   Activity Tolerance   Activity Tolerance Patient tolerated treatment well   Medical Staff Made Aware DPT 2' pts med complexity, comorbidities and regression from baseline.   Nurse Made Aware cleared   RUE Assessment   RUE Assessment WFL   LUE Assessment   LUE Assessment WFL   Hand Function   Gross Motor Coordination Functional   Fine Motor Coordination Functional   Cognition   Overall Cognitive Status Impaired   Arousal/Participation Responsive;Alert   Attention Attends with cues to redirect   Orientation Level Oriented to person;Disoriented to place;Disoriented to time;Disoriented to situation   Memory Decreased recall of precautions;Decreased recall of recent events   Following Commands Follows one step commands with increased time or repetition   Comments pt cooperative, with baseline dementia. spouse present and reports her cognition today is baseline.   Assessment   Limitation Decreased ADL status;Decreased  endurance;Decreased high-level ADLs   Prognosis Good   Assessment Pt is a 79 y.o. female  admitted 9/27/24 w pericardial effusion, which is being managed medically at this time. Pt w active OT eval and treat orders. PMH includes  has a past medical history of Disease of thyroid gland, Hyperlipidemia, Hypertension, Memory loss, Shingles, and Visual impairment. Pt lives w spouse in a 1 sh w 1 sherif, has tu shower with grab bars and shower chair, raised toilet. Pta, pt was independent w/ ADL/IADL and functional mobility, was not driving. Currently, pt is Supervision for UB ADL, Min Ax1 for LB ADL, and completed transfers/FM w Min Ax1. Pt is limited at this time 2* decreased endurance/activtiy tolerance, decreased cognition, decreased ADL/High-level ADL status, decreased self-care trans, decreased safety awareness, limited home support and is a fall risk. This impacts pt's ability to complete UB and LB dressing and bathing, toileting, transfers, functional mobility, community mobility, home and health maintenance, and safe engagement in typical daily routine. The patient's raw score on the -PAC Daily Activity inpatient short form is 21, standardized score is 44.27, greater than 39.4. Patients at this level are likely to benefit from discharge to home. Please refer to the recommendation of the Occupational Therapist for safe discharge planning. From OT standpoint, pt should D/C to home w no needs when medically stable. Pt will benefit from continued acute OT services 2-3 x/wk for 10-14 days to meet goals.   Goals   Patient Goals go home   LTG Time Frame 10-14   Long Term Goal #1 see below   Plan   Treatment Interventions ADL retraining;Functional transfer training;Endurance training;Energy conservation;Activityengagement;Cognitive reorientation;Compensatory technique education   Goal Expiration Date 10/15/24   OT Frequency 2-3x/wk   Discharge Recommendation   Rehab Resource Intensity Level, OT No post-acute  rehabilitation needs   AM-PAC Daily Activity Inpatient   Lower Body Dressing 3   Bathing 3   Toileting 3   Upper Body Dressing 4   Grooming 4   Eating 4   Daily Activity Raw Score 21   Daily Activity Standardized Score (Calc for Raw Score >=11) 44.27   AM-PAC Applied Cognition Inpatient   Following a Speech/Presentation 4   Understanding Ordinary Conversation 4   Taking Medications 4   Remembering Where Things Are Placed or Put Away 4   Remembering List of 4-5 Errands 4   Taking Care of Complicated Tasks 4   Applied Cognition Raw Score 24   Applied Cognition Standardized Score 62.21   Modified Pee Scale   Modified Pee Scale 4   End of Consult   Education Provided Yes   Patient Position at End of Consult Bedside chair;Bed/Chair alarm activated;All needs within reach   Nurse Communication Nurse aware of consult     Pt will complete functional mobility with Mod I using appropriate DME as needed.     Pt will complete UB dressing and bathing with Mod I using appropriate DME as needed.     Pt will complete LB dressing and bathing with Mod I using appropriate DME as needed.    Pt will complete transfers with Mod I using appropriate DME as needed.     Pt will complete toileting with Mod I using appropriate DME as needed.     Pt will utilize energy conservation techniques throughout functional activity/ADL s/p skilled education.     Pt will demonstrate increased safety awareness during functional tasks/ADL's s/p skilled education.     Pt will increase activity tolerance to 30 minutes in order to complete ADL's/ functional tasks, using appropriate DME as needed.     Iesha Escobedo, CINTHYA, OTR/L

## 2024-10-01 NOTE — PROGRESS NOTES
Patient:    MRN:  [id number]    Amari Request ID:  [id number]    Level of care reserved:  Home Health Agency    Partner Reserved:  [facility], [place] [address] [phone]    Clinical needs requested:    Geography searched:  [phone]    Start of Service:    Request sent:  12:01pm EDT on 10/1/2024 by Jessica Nolen    Partner reserved:  1:58pm EDT on 10/1/2024 by Jessica Nolen    Choice list shared:  1:57pm EDT on 10/1/2024 by Jessica Nolen

## 2024-10-01 NOTE — PHYSICAL THERAPY NOTE
Physical Therapy Evaluation     Patient's Name: Tanya Stevens    Admitting Diagnosis  Pericardial effusion [I31.39]    Problem List  Patient Active Problem List   Diagnosis    Benign essential HTN    Hyperlipidemia    Hypothyroidism    Obesity, morbid (HCC)    Vitamin D deficiency    Stage 3a chronic kidney disease (HCC)    Iron deficiency anemia secondary to inadequate dietary iron intake    Low folate    Dementia without behavioral disturbance (HCC)    Rash    High serum methylmalonic acid    Hypertensive kidney disease    Swallowing disorder    Pericardial effusion       Past Medical History  Past Medical History:   Diagnosis Date    Disease of thyroid gland     Hyperlipidemia     Hypertension     Memory loss     Shingles     Visual impairment        Past Surgical History  Past Surgical History:   Procedure Laterality Date    BREAST BIOPSY Left     BREAST CYST EXCISION Right 1983    COLONOSCOPY      EYE SURGERY      JOINT REPLACEMENT      TOTAL HIP ARTHROPLASTY Bilateral     TOTAL KNEE ARTHROPLASTY Right     UPPER GASTROINTESTINAL ENDOSCOPY            10/01/24 0834   PT Last Visit   PT Visit Date 10/01/24   Note Type   Note type Evaluation   Additional Comments 79 y.o. female admitted to [facility] on 9/27/2024 with Pericardial effusion.   End of Consult   Patient Position at End of Consult Bedside chair;All needs within reach;Bed/Chair alarm activated   Pain Assessment   Pain Assessment Tool 0-10   Pain Score No Pain   Restrictions/Precautions   Weight Bearing Precautions Per Order No   Other Precautions Cognitive;Chair Alarm;Bed Alarm;Fall Risk  (purewick)   Home Living   Type of Home House   Home Layout One level;Stairs to enter with rails   Bathroom Shower/Tub Tub/shower unit   Bathroom Toilet Raised   Bathroom Equipment Grab bars in shower;Shower chair   Bathroom Accessibility Accessible   Home Equipment Walker  (prn)   Additional Comments At baseline, pt resides with spouse in 1  with [facility]  "and requires some assistance and uses RW prn prior to hospital admission. Patient with history of dementia, AO to self only. Patient spouse in the room throughout evaluation.   Prior Function   Level of [place] Independent with ADLs;Independent with functional mobility;Independent with IADLS   Lives With Spouse   Receives Help From Family   IADLs Family/Friend/Other provides transportation;Independent with meal prep;Independent with medication management   Falls in the last 6 months 0   Vocational Retired   General   Additional Pertinent History Patient  has a past medical history of Disease of thyroid gland, Hyperlipidemia, Hypertension, Memory loss, Shingles, and Visual impairment., CKD, and hx of PE.   Family/Caregiver Present Yes  (spouse)   Cognition   Overall Cognitive Status Impaired   Arousal/Participation Alert   Orientation Level Oriented to person;Disoriented to place;Disoriented to time;Disoriented to situation   Memory Decreased short term memory;Decreased recall of recent events   Following Commands Follows one step commands without difficulty   Comments patient pleasant and cooperative   Subjective   Subjective \"I haven't been out of bed in a while\"   RLE Assessment   RLE Assessment   (4-/5 grossly)   LLE Assessment   LLE Assessment   (4-/5 grossly)   Bed Mobility   Supine to Sit 5  Supervision   Additional items HOB elevated;Bedrails;Increased time required;Verbal cues   Sit to Supine Unable to assess   Additional Comments patient left OOB in bedside chair with all needs met   Transfers   Sit to Stand 5  Supervision   Additional items Increased time required;Verbal cues   Stand to Sit 5  Supervision   Additional items Armrests;Increased time required;Verbal cues   Additional Comments x3 STS with RW   Ambulation/Elevation   Gait pattern Forward Flexion;Decreased foot clearance;Inconsistent sidney;Step to;Excessively slow   Gait Assistance 5  Supervision   Additional items Verbal cues   Assistive " Device Rolling walker   Distance 60' + 60'   Stair Management Assistance Not tested   Ambulation/Elevation Additional Comments Patient ambulated 60' + 60' with RW, requiring occasional verbal cues for RW management and improving stride/step length. Patient required ~5 min seated rest break due to LE weakness and decreased activity tolerance.   Balance   Static Sitting Good   Dynamic Sitting Fair +   Static Standing Fair   Dynamic Standing Fair -   Ambulatory Fair -   Endurance Deficit   Endurance Deficit Yes   Endurance Deficit Description generalized LE weakness, decreased activity tolerance   Activity Tolerance   Activity Tolerance Patient tolerated treatment well;Patient limited by fatigue   Medical Staff Made Aware JOSE Julian; This high complexity evaluation was performed with an occupational therapist due to the patient's co-morbidities, clinically unstable presentation, and present impairments which are a regression from the patient's baseline.   Nurse Made Aware RN Cleared   Assessment   Prognosis Good   Problem List Decreased strength;Decreased endurance;Decreased mobility;Decreased safety awareness;Decreased skin integrity   Assessment PT completed evaluation of 79 y.o. female admitted to [facility] on 9/27/2024 with Pericardial effusion.  Patient's current status instabilities include ongoing pain, multiple lines, continuous O2/HR monitoring, regression in function from baseline.  Patient  has a past medical history of Disease of thyroid gland, Hyperlipidemia, Hypertension, Memory loss, Shingles, and Visual impairment., CKD, and hx of PE. At baseline, pt resides with spouse in 1  with 1 [place] and requires some assistance and uses RW prn prior to hospital admission. Patient with history of dementia, AO to self only. Patient spouse in the room throughout evaluation.     Patient presents at time of PT evaluation functioning below baseline and currently w/ overall mobility deficits due to: impaired  balance, decreased endurance, gait dysfunction, decreased activity tolerance and fall risk. During PT evaluation, patient currently is requiring supervision for bed mobility skills; for functional transfers and  for ambulation with RW. Patient performed supine to sit to edge of bed requiring use of bed rails and HOB elevated due to limited trunk/core strength. Patient ambulated 60' + 60' with RW, requiring occasional verbal cues for RW management and improving stride/step length. Patient required ~5 min seated rest break due to LE weakness and decreased activity tolerance. Pt left OOB in bedside chair with alarm and all needs met.     This patient is functioning below their baseline and is recommended for level III. Patient will benefit from continued skilled PT this admission to achieve maximal function and safety. The patients AM-PAC Basic Mobility Inpatient Short From Raw Score is 17 . Based on AM-PAC scoring and patient presentation, PT currently recommending Level III (Minimum Resource Intensity). Please also refer to the recommendation of the Physical Therapist for safe discharge planning.   Barriers to Discharge None   Goals   Patient Goals to return home   STG Expiration Date 10/15/24   Short Term Goal #1 1) Perform bed mobility mod-I to participate in frequent repositioning and improve skin integrity; 2) Perform functional transfers mod-I to promote I with toileting and OOB mobility; 3) Ambulate 200 feet mod-I with least restrictive device to participate in household and community level mobility; 4) Improve b/l LE strength by 1/2 grade in order to improve efficiency of tranfers; 5) Improve balance by 1 grade to reduce risk for falls; 6) Improve overall activity tolerance to 60 minutes in order to increase patient's ability to engage in mobility tasks; 7) Navigate 1 steps S level in order to safely navigate into the home   PT Treatment Day 0   Plan   Treatment/Interventions ADL retraining;LE  strengthening/ROM;Functional transfer training;Therapeutic exercise;Elevations;Endurance training;Patient/family training;Bed mobility;Gait training;Spoke to nursing;OT   PT Frequency 3-5x/wk   Discharge Recommendation   Rehab Resource Intensity Level, PT III (Minimum Resource Intensity)   Equipment Recommended Walker   AM-PAC Basic Mobility Inpatient   Turning in Flat Bed Without Bedrails 3   Lying on Back to Sitting on Edge of Flat Bed Without Bedrails 3   Moving Bed to Chair 3   Standing Up From Chair Using Arms 3   Walk in Room 3   Climb 3-5 Stairs With Railing 2   Basic Mobility Inpatient Raw Score 17   Basic Mobility Standardized Score 39.67   [facility] Highest Level Of Mobility   -HLM Goal 5: Stand one or more mins   -HLM Achieved 7: Walk 25 feet or more   End of Consult   Patient Position at End of Consult Bedside chair;Bed/Chair alarm activated;All needs within reach         Lluvia Gaspar, PT, DPT

## 2024-10-01 NOTE — ASSESSMENT & PLAN NOTE
Initially presented to the [facility] with progressive dyspnea found to have moderate-sized pleural effusion status post a recent viral URI  Echocardiogram showing preserved ejection fraction 65% grade-1 diastolic stock dysfunction moderate pericardial effusion circumferential with no evidence of tamponade -> transferred here to the [facility]  Appreciate cardiology input -> initiated on a long-term Colchicine course (6 months) along with an ongoing Ibuprofen tapering course (600 mg BID x 1 week - 400 mg BID x 1 week - 200 mg BID x 1 week)  Cardiology recommending repeat echocardiogram in 2 weeks  Anticipate discharge in 24-48 hours

## 2024-10-01 NOTE — CASE MANAGEMENT
Case Management Discharge Planning Note    Patient name Tanya Stevens  Location [facility] 504/[facility] 504-01 MRN [id number]  : 1945 Date 10/1/2024       Current Admission Date: 2024  Current Admission Diagnosis:Pericardial effusion   Patient Active Problem List    Diagnosis Date Noted Date Diagnosed    Pericardial effusion 2024     Swallowing disorder 2024     Hypertensive kidney disease 2024     High serum methylmalonic acid 04/15/2024     Dementia without behavioral disturbance (HCC) 2023     Rash 2023     Iron deficiency anemia secondary to inadequate dietary iron intake 2023     Low folate 2023     Stage 3a chronic kidney disease (HCC) 2022     Obesity, morbid (HCC)      Hypothyroidism 2014     Benign essential HTN 2012     Hyperlipidemia 2012     Vitamin D deficiency 2012       LOS (days): 4  Geometric Mean LOS (GMLOS) (days): 2.7  Days to GMLOS:-1     OBJECTIVE:  Risk of Unplanned Readmission Score: 6.82         Current admission status: Inpatient   Preferred Pharmacy:   [facility]/pharmacy #[id number] - [address] [address]  Phone: [phone] Fax: [phone]    Primary Care Provider: Alicia Linton MD    Primary Insurance: [facility]  Secondary Insurance:     DISCHARGE DETAILS:    Discharge planning discussed with::  and pt at bedside  Freedom of Choice: Yes  Comments - Freedom of Choice: therapy recommending HHC. Met w/ pt and  to discuss same. They are agreeable to blanket referals w/ preference for [facility]. Referals sent via Aidin  CM contacted family/caregiver?: Yes  Were Treatment Team discharge recommendations reviewed with patient/caregiver?: Yes  Did patient/caregiver verbalize understanding of patient care needs?: Yes  Were patient/caregiver advised of the risks associated with not following Treatment Team discharge recommendations?: Yes    Contacts  Patient Contacts: Pankaj  Hilda/ and pt  Relationship to Patient:: Family  Contact Method: In Person  Reason/Outcome: Referral, Discharge Planning    Requested Home Health Care         Is the patient interested in HHC at discharge?: Yes  Home Health Discipline requested:: Occupational Therapy, Physical Therapy  Home Health Agency Name:: Other  Home Health Follow-Up Provider:: PCP  Home Health Services Needed:: Strengthening/Theraputic Exercises to Improve Function, Evaluate Functional Status and Safety, Gait/ADL Training  Homebound Criteria Met:: Requires the Assistance of Another Person for Safe Ambulation or to Leave the Home  Supporting Clincal Findings:: Cognitive Deficit Requiring the Assistance of Others, Limited Endurance         Other Referral/Resources/Interventions Provided:  Interventions: HHC         Treatment Team Recommendation: Home with Home Health Care  Discharge Destination Plan:: Home with Home Health Care  Transport at Discharge : Family        **addendum 1429:  Pt accepted by [facility] for home care f/u. Updated in AVS.

## 2024-10-01 NOTE — ASSESSMENT & PLAN NOTE
Baseline creatinine of approximately 1.0-1.1 - currently stable at 1.18  Monitor renal function and urine output  Limit/avoid nephrotoxins and hypotension as possible -> note ongoing NSAID use

## 2024-10-01 NOTE — PLAN OF CARE
Problem: PAIN - ADULT  Goal: Verbalizes/displays adequate comfort level or baseline comfort level  Description: Interventions:  - Encourage patient to monitor pain and request assistance  - Assess pain using appropriate pain scale  - Administer analgesics based on type and severity of pain and evaluate response  - Implement non-pharmacological measures as appropriate and evaluate response  - Consider cultural and social influences on pain and pain management  - Notify physician/advanced practitioner if interventions unsuccessful or patient reports new pain  Outcome: Progressing     Problem: INFECTION - ADULT  Goal: Absence or prevention of progression during hospitalization  Description: INTERVENTIONS:  - Assess and monitor for signs and symptoms of infection  - Monitor lab/diagnostic results  - Monitor all insertion sites, i.e. indwelling lines, tubes, and drains  - Monitor endotracheal if appropriate and nasal secretions for changes in amount and color  - [place] appropriate cooling/warming therapies per order  - Administer medications as ordered  - Instruct and encourage patient and family to use good hand hygiene technique  - Identify and instruct in appropriate isolation precautions for identified infection/condition  Outcome: Progressing  Goal: Absence of fever/infection during neutropenic period  Description: INTERVENTIONS:  - Monitor WBC    Outcome: Progressing     Problem: SAFETY ADULT  Goal: Patient will remain free of falls  Description: INTERVENTIONS:  - Educate patient/family on patient safety including physical limitations  - Instruct patient to call for assistance with activity   - Consult OT/PT to assist with strengthening/mobility   - Keep Call bell within reach  - Keep bed low and locked with side rails adjusted as appropriate  - Keep care items and personal belongings within reach  - Initiate and maintain comfort rounds  - Make Fall Risk Sign visible to staff  - Offer Toileting every 2 Hours,  in advance of need  - Initiate/Maintain Bed alarm    - Apply yellow socks and bracelet for high fall risk patients  - Consider moving patient to room near nurses station  Outcome: Progressing  Goal: Maintain or return to baseline ADL function  Description: INTERVENTIONS:  -  Assess patient's ability to carry out ADLs; assess patient's baseline for ADL function and identify physical deficits which impact ability to perform ADLs (bathing, care of mouth/teeth, toileting, grooming, dressing, etc.)  - Assess/evaluate cause of self-care deficits   - Assess range of motion  - Assess patient's mobility; develop plan if impaired  - Assess patient's need for assistive devices and provide as appropriate  - Encourage maximum independence but intervene and supervise when necessary  - Involve family in performance of ADLs  - Assess for home care needs following discharge   - Consider OT consult to assist with ADL evaluation and planning for discharge  - Provide patient education as appropriate  Outcome: Progressing  Goal: Maintains/Returns to pre admission functional level  Description: INTERVENTIONS:  - Perform AM-PAC 6 Click Basic Mobility/ Daily Activity assessment daily.  - Set and communicate daily mobility goal to care team and patient/family/caregiver.   - Collaborate with rehabilitation services on mobility goals if consulted    - Reposition patient every 2 hours.  - Dangle patient 3 times a day  - Stand patient 3 times a day  - Ambulate patient 3 times a day  - Out of bed to chair 3 times a day   - Out of bed for meals 3 times a day  - Out of bed for toileting  - Record patient progress and toleration of activity level   Outcome: Progressing     Problem: DISCHARGE PLANNING  Goal: Discharge to home or other facility with appropriate resources  Description: INTERVENTIONS:  - Identify barriers to discharge w/patient and caregiver  - Arrange for needed discharge resources and transportation as appropriate  - Identify  discharge learning needs (meds, wound care, etc.)  - Arrange for interpretive services to assist at discharge as needed  - Refer to Case Management Department for coordinating discharge planning if the patient needs post-hospital services based on physician/advanced practitioner order or complex needs related to functional status, cognitive ability, or social support system  Outcome: Progressing     Problem: Knowledge Deficit  Goal: Patient/family/caregiver demonstrates understanding of disease process, treatment plan, medications, and discharge instructions  Description: Complete learning assessment and assess knowledge base.  Interventions:  - Provide teaching at level of understanding  - Provide teaching via preferred learning methods  Outcome: Progressing     Problem: Prexisting or High Potential for Compromised Skin Integrity  Goal: Skin integrity is maintained or improved  Description: INTERVENTIONS:  - Identify patients at risk for skin breakdown  - Assess and monitor skin integrity  - Assess and monitor nutrition and hydration status  - Monitor labs   - Assess for incontinence   - Turn and reposition patient  - Assist with mobility/ambulation  - Relieve pressure over bony prominences  - Avoid friction and shearing  - Provide appropriate hygiene as needed including keeping skin clean and dry  - Evaluate need for skin moisturizer/barrier cream  - Collaborate with interdisciplinary team   - Patient/family teaching  - Consider wound care consult   Outcome: Progressing     Problem: METABOLIC, FLUID AND ELECTROLYTES - ADULT  Goal: Fluid balance maintained  Description: INTERVENTIONS:  - Monitor labs   - Monitor I/O and WT  - Instruct patient on fluid and nutrition as appropriate  - Assess for signs & symptoms of volume excess or deficit  Outcome: Progressing

## 2024-10-02 LAB
ANION GAP SERPL CALCULATED.3IONS-SCNC: 7 MMOL/L (ref 4–13)
B PARAP IS1001 DNA NPH QL NAA+NON-PROBE: NOT DETECTED
B PERT.PT PRMT NPH QL NAA+NON-PROBE: NOT DETECTED
BASOPHILS # BLD AUTO: 0.04 THOUSANDS/ÂΜL (ref 0–0.1)
BASOPHILS NFR BLD AUTO: 1 % (ref 0–1)
BUN SERPL-MCNC: 23 MG/DL (ref 5–25)
C PNEUM DNA NPH QL NAA+NON-PROBE: NOT DETECTED
CALCIUM SERPL-MCNC: 8.7 MG/DL (ref 8.4–10.2)
CHLORIDE SERPL-SCNC: 101 MMOL/L (ref 96–108)
CO2 SERPL-SCNC: 31 MMOL/L (ref 21–32)
CREAT SERPL-MCNC: 1.19 MG/DL (ref 0.6–1.3)
EOSINOPHIL # BLD AUTO: 0.18 THOUSAND/ÂΜL (ref 0–0.61)
EOSINOPHIL NFR BLD AUTO: 2 % (ref 0–6)
ERYTHROCYTE [DISTWIDTH] IN BLOOD BY AUTOMATED COUNT: 13 % (ref 11.6–15.1)
FLUAV RNA NPH QL NAA+NON-PROBE: NOT DETECTED
FLUBV RNA NPH QL NAA+NON-PROBE: NOT DETECTED
GFR SERPL CREATININE-BSD FRML MDRD: 42 ML/MIN/1.73SQ M
GLUCOSE SERPL-MCNC: 90 MG/DL (ref 65–140)
HADV DNA NPH QL NAA+NON-PROBE: NOT DETECTED
HCOV 229E RNA NPH QL NAA+NON-PROBE: NOT DETECTED
HCOV HKU1 RNA NPH QL NAA+NON-PROBE: NOT DETECTED
HCOV NL63 RNA NPH QL NAA+NON-PROBE: NOT DETECTED
HCOV OC43 RNA NPH QL NAA+NON-PROBE: NOT DETECTED
HCT VFR BLD AUTO: 36.8 % (ref 34.8–46.1)
HGB BLD-MCNC: 12 G/DL (ref 11.5–15.4)
HMPV RNA NPH QL NAA+NON-PROBE: NOT DETECTED
HPIV1 RNA NPH QL NAA+NON-PROBE: NOT DETECTED
HPIV2 RNA NPH QL NAA+NON-PROBE: NOT DETECTED
HPIV3 RNA NPH QL NAA+NON-PROBE: NOT DETECTED
HPIV4 RNA NPH QL NAA+NON-PROBE: NOT DETECTED
IMM GRANULOCYTES # BLD AUTO: 0.04 THOUSAND/UL (ref 0–0.2)
IMM GRANULOCYTES NFR BLD AUTO: 1 % (ref 0–2)
LYMPHOCYTES # BLD AUTO: 1.1 THOUSANDS/ÂΜL (ref 0.6–4.47)
LYMPHOCYTES NFR BLD AUTO: 14 % (ref 14–44)
M PNEUMO DNA NPH QL NAA+NON-PROBE: NOT DETECTED
MCH RBC QN AUTO: 30.2 PG (ref 26.8–34.3)
MCHC RBC AUTO-ENTMCNC: 32.6 G/DL (ref 31.4–37.4)
MCV RBC AUTO: 93 FL (ref 82–98)
MONOCYTES # BLD AUTO: 1.03 THOUSAND/ÂΜL (ref 0.17–1.22)
MONOCYTES NFR BLD AUTO: 13 % (ref 4–12)
NEUTROPHILS # BLD AUTO: 5.78 THOUSANDS/ÂΜL (ref 1.85–7.62)
NEUTS SEG NFR BLD AUTO: 69 % (ref 43–75)
NRBC BLD AUTO-RTO: 0 /100 WBCS
PLATELET # BLD AUTO: 422 THOUSANDS/UL (ref 149–390)
PMV BLD AUTO: 9 FL (ref 8.9–12.7)
POTASSIUM SERPL-SCNC: 4 MMOL/L (ref 3.5–5.3)
RBC # BLD AUTO: 3.98 MILLION/UL (ref 3.81–5.12)
RSV RNA NPH QL NAA+NON-PROBE: NOT DETECTED
RV+EV RNA NPH QL NAA+NON-PROBE: NOT DETECTED
SARS-COV-2 RNA NPH QL NAA+NON-PROBE: NOT DETECTED
SODIUM SERPL-SCNC: 139 MMOL/L (ref 135–147)
WBC # BLD AUTO: 8.17 THOUSAND/UL (ref 4.31–10.16)

## 2024-10-02 PROCEDURE — 80048 BASIC METABOLIC PNL TOTAL CA: CPT | Performed by: INTERNAL MEDICINE

## 2024-10-02 PROCEDURE — 0202U NFCT DS 22 TRGT SARS-COV-2: CPT | Performed by: INTERNAL MEDICINE

## 2024-10-02 PROCEDURE — 94760 N-INVAS EAR/PLS OXIMETRY 1: CPT

## 2024-10-02 PROCEDURE — 92610 EVALUATE SWALLOWING FUNCTION: CPT

## 2024-10-02 PROCEDURE — 85025 COMPLETE CBC W/AUTO DIFF WBC: CPT | Performed by: INTERNAL MEDICINE

## 2024-10-02 PROCEDURE — 99232 SBSQ HOSP IP/OBS MODERATE 35: CPT | Performed by: INTERNAL MEDICINE

## 2024-10-02 RX ADMIN — LEVOTHYROXINE SODIUM 50 MCG: 50 TABLET ORAL at 06:10

## 2024-10-02 RX ADMIN — COLCHICINE 0.6 MG: 0.6 TABLET ORAL at 17:14

## 2024-10-02 RX ADMIN — COLCHICINE 0.6 MG: 0.6 TABLET ORAL at 09:30

## 2024-10-02 RX ADMIN — HEPARIN SODIUM 5000 UNITS: 5000 INJECTION INTRAVENOUS; SUBCUTANEOUS at 06:10

## 2024-10-02 RX ADMIN — VERAPAMIL HYDROCHLORIDE 180 MG: 180 TABLET ORAL at 09:29

## 2024-10-02 RX ADMIN — PRAVASTATIN SODIUM 40 MG: 40 TABLET ORAL at 17:14

## 2024-10-02 RX ADMIN — PANTOPRAZOLE SODIUM 40 MG: 40 TABLET, DELAYED RELEASE ORAL at 06:10

## 2024-10-02 RX ADMIN — HEPARIN SODIUM 5000 UNITS: 5000 INJECTION INTRAVENOUS; SUBCUTANEOUS at 21:05

## 2024-10-02 RX ADMIN — HEPARIN SODIUM 5000 UNITS: 5000 INJECTION INTRAVENOUS; SUBCUTANEOUS at 14:24

## 2024-10-02 RX ADMIN — IBUPROFEN 600 MG: 600 TABLET, FILM COATED ORAL at 09:29

## 2024-10-02 RX ADMIN — IBUPROFEN 600 MG: 600 TABLET, FILM COATED ORAL at 17:14

## 2024-10-02 NOTE — ASSESSMENT & PLAN NOTE
Initially presented to the [facility] with progressive dyspnea found to have moderate-sized pleural effusion status post a recent viral URI  Echocardiogram showing preserved ejection fraction 65% grade-1 diastolic stock dysfunction moderate pericardial effusion circumferential with no evidence of tamponade -> transferred here to the [facility]  Appreciate cardiology input -> initiated on a long-term Colchicine course (6 months) along with an ongoing Ibuprofen tapering course (600 mg BID x 1 week - 400 mg BID x 1 week - 200 mg BID x 1 week)  Cardiology recommending repeat echocardiogram in 2 weeks  Anticipating discharge in the upcoming days with improvement in cough/congestion

## 2024-10-02 NOTE — SPEECH THERAPY NOTE
Speech-Language Pathology Bedside Swallow Evaluation      Patient Name: Tanya Stevens    Today's Date: 10/2/2024     Problem List  Principal Problem:    Pericardial effusion  Active Problems:    Benign essential HTN    Hyperlipidemia    Hypothyroidism    Stage 3a chronic kidney disease (HCC)    Dementia without behavioral disturbance (HCC)    Leukocytosis    Hypokalemia      Past Medical History  Past Medical History:   Diagnosis Date    Disease of thyroid gland     Hyperlipidemia     Hypertension     Memory loss     Shingles     Visual impairment        Past Surgical History  Past Surgical History:   Procedure Laterality Date    BREAST BIOPSY Left     BREAST CYST EXCISION Right 1983    COLONOSCOPY      EYE SURGERY      JOINT REPLACEMENT      TOTAL HIP ARTHROPLASTY Bilateral     TOTAL KNEE ARTHROPLASTY Right     UPPER GASTROINTESTINAL ENDOSCOPY         Summary   Pt presented with s/s suggestive of mild-moderate oral dysphagia characterized by decreased rotary chew pattern, with prolonged mastication and transfer time and intermittent oral residue with regular solids. Patient appeared to tolerate chopped solids a bit better with decreased residue. Pharyngeal stage of swallowing appears adequate.      at bedside. He reports prolonged mastication and pocketing events that happen at home. It will take her over an hour to eat sometimes, and she will spit out pocketed items. He denies any coughing/choking episodes or recent pneumonia.     Risk/s for Aspiration: low     Recommended Diet: soft/level 3 diet and thin liquids   Recommended Form of Meds: crushed with puree   Aspiration precautions and swallowing strategies: upright posture, small bites/sips, and alternating bites and sips  Other Recommendations: Continue frequent oral care; ST through [facility]    Current Medical Status  Medical Decision Making  Patient is a 79 y.o. year-old female w/ hx of dementia, hypothyroidism, HTN presenting with dyspnea x1 wk. VS  notable for tachypnea/hypoxia with increased WOB and tachycardia but afebrile. Cardiopulm exam notable for distant heart sounds and bilateral rhonchi without significant lower extremity edema. Workup notable for large pericardial effusion on CTA PE with bilateral pleural effusions. VSS on 2L NC. Accepted for transfer to [facility] with Cardiology recommending TTE and CT surgery consult in AM.    Current Precautions:  Fall  Aspiration    Allergies:  Shellfish  Past medical history:  Please see H&P for details    Special Studies:  CT chest 9/27/24:   No acute pulmonary embolus   Large pericardial effusion. Recommend cardiology consultation.   Small bilateral pleural effusions.   Incidental thyroid nodule(s) for which nonemergent thyroid ultrasound is recommended. However, consider patient's age and clinical status to assess need for further follow-up   Large hiatal hernia    Social/Education/Vocational Hx:  Pt lives with family    Swallow Information   Current Risks for Dysphagia & Aspiration: known history of dysphagia  Current Symptoms/Concerns: pocketing food and prolonged mastication   Current Diet: regular diet and thin liquids   Baseline Diet: regular diet and thin liquids    Baseline Assessment   Behavior/Cognition: alert  Speech/Language Status: able to participate in basic conversation and able to follow commands  Patient Positioning: upright in chair  Pain Status/Interventions/Response to Interventions: No report of or nonverbal indications of pain.     Swallow Mechanism Exam  Facial: symmetrical  Labial: WFL  Lingual: WFL  Velum: unable to visualize  Mandible: adequate ROM  Dentition: adequate  Vocal quality:clear/adequate   Respiratory Status: on RA       Consistencies Assessed and Performance   Consistencies Administered: thin liquids, puree, and hard solids  Materials administered included mashed potatoes and carrots with thin liquids    Oral Stage: mild-moderate  Decreased rotary chew pattern observed, with  prolonged transfer time and buccal pocketing. Patient is eventually able to clear. She appears to tolerate chopped carrots better than whole pieces.     Pharyngeal Stage: WFL  Swallow Mechanics:  Swallowing initiation appeared prompt.  Laryngeal rise was assessed and judged to be within functional limits.  No coughing, throat clearing, change in vocal quality or respiratory status noted today.     Esophageal Concerns: none reported    Summary and Recommendations (see above)    Results Reviewed with: patient, RN, MD, and family     Treatment Recommended: dysphagia therapy      Frequency of treatment: 2-3 x week as able     Patient Stated Goal: none stated     Dysphagia LTG  -Patient will demonstrate safe and effective oral intake (without overt s/s significant oral/pharyngeal dysphagia including s/s penetration or aspiration) for the highest appropriate diet level.     Short Term Goals:  -Pt will tolerate Dysphagia 3/advanced (dental soft) diet and  thin liquid with no significant s/s oral or pharyngeal dysphagia across 1-3 diagnostic session/s.    Speech Therapy Prognosis   Prognosis: fair    Prognosis Considerations: cognitive status

## 2024-10-02 NOTE — RESTORATIVE TECHNICIAN NOTE
Restorative Technician Note      Patient Name: Tanya Stevens     [facility] Tech Visit Date: 10/02/24  Note Type: Mobility  Patient Position Upon Consult: Supine  Activity Performed: Ambulated  Assistive Device: Roller walker  Patient Position at End of Consult: Bedside chair; All needs within reach; Bed/Chair alarm activated

## 2024-10-02 NOTE — RESTORATIVE TECHNICIAN NOTE
Restorative Technician Note      Patient Name: Tanya Stevens     [facility] Tech Visit Date: 10/02/24  Note Type: Mobility  Patient Position Upon Consult: Supine  Activity Performed: Ambulated  Assistive Device: Roller walker  Patient Position at End of Consult: Bedside chair; All needs within reach; Bed/Chair alarm activated

## 2024-10-02 NOTE — PLAN OF CARE
Problem: PAIN - ADULT  Goal: Verbalizes/displays adequate comfort level or baseline comfort level  Description: Interventions:  - Encourage patient to monitor pain and request assistance  - Assess pain using appropriate pain scale  - Administer analgesics based on type and severity of pain and evaluate response  - Implement non-pharmacological measures as appropriate and evaluate response  - Consider cultural and social influences on pain and pain management  - Notify physician/advanced practitioner if interventions unsuccessful or patient reports new pain  Outcome: Progressing     Problem: INFECTION - ADULT  Goal: Absence or prevention of progression during hospitalization  Description: INTERVENTIONS:  - Assess and monitor for signs and symptoms of infection  - Monitor lab/diagnostic results  - Monitor all insertion sites, i.e. indwelling lines, tubes, and drains  - Monitor endotracheal if appropriate and nasal secretions for changes in amount and color  - [facility] appropriate cooling/warming therapies per order  - Administer medications as ordered  - Instruct and encourage patient and family to use good hand hygiene technique  - Identify and instruct in appropriate isolation precautions for identified infection/condition  Outcome: Progressing  Goal: Absence of fever/infection during neutropenic period  Description: INTERVENTIONS:  - Monitor WBC    Outcome: Progressing     Problem: SAFETY ADULT  Goal: Patient will remain free of falls  Description: INTERVENTIONS:  - Educate patient/family on patient safety including physical limitations  - Instruct patient to call for assistance with activity   - Consult OT/PT to assist with strengthening/mobility   - Keep Call bell within reach  - Keep bed low and locked with side rails adjusted as appropriate  - Keep care items and personal belongings within reach  - Initiate and maintain comfort rounds  - Make Fall Risk Sign visible to staff  - Offer Toileting every 2 Hours,  in advance of need  - Initiate/Maintain Bed alarm    - Apply yellow socks and bracelet for high fall risk patients  - Consider moving patient to room near nurses station  Outcome: Progressing  Goal: Maintain or return to baseline ADL function  Description: INTERVENTIONS:  -  Assess patient's ability to carry out ADLs; assess patient's baseline for ADL function and identify physical deficits which impact ability to perform ADLs (bathing, care of mouth/teeth, toileting, grooming, dressing, etc.)  - Assess/evaluate cause of self-care deficits   - Assess range of motion  - Assess patient's mobility; develop plan if impaired  - Assess patient's need for assistive devices and provide as appropriate  - Encourage maximum independence but intervene and supervise when necessary  - Involve family in performance of ADLs  - Assess for home care needs following discharge   - Consider OT consult to assist with ADL evaluation and planning for discharge  - Provide patient education as appropriate  Outcome: Progressing  Goal: Maintains/Returns to pre admission functional level  Description: INTERVENTIONS:  - Perform AM-PAC 6 Click Basic Mobility/ Daily Activity assessment daily.  - Set and communicate daily mobility goal to care team and patient/family/caregiver.   - Collaborate with rehabilitation services on mobility goals if consulted  - Perform Range of Motion 3 times a day.  - Reposition patient every 2 hours.  - Dangle patient 3 times a day  - Stand patient 3 times a day  - Ambulate patient 3 times a day  - Out of bed to chair 3 times a day   - Out of bed for meals 3 times a day  - Out of bed for toileting  - Record patient progress and toleration of activity level   Outcome: Progressing     Problem: DISCHARGE PLANNING  Goal: Discharge to home or other facility with appropriate resources  Description: INTERVENTIONS:  - Identify barriers to discharge w/patient and caregiver  - Arrange for needed discharge resources and  transportation as appropriate  - Identify discharge learning needs (meds, wound care, etc.)  - Arrange for interpretive services to assist at discharge as needed  - Refer to Case Management Department for coordinating discharge planning if the patient needs post-hospital services based on physician/advanced practitioner order or complex needs related to functional status, cognitive ability, or social support system  Outcome: Progressing     Problem: Knowledge Deficit  Goal: Patient/family/caregiver demonstrates understanding of disease process, treatment plan, medications, and discharge instructions  Description: Complete learning assessment and assess knowledge base.  Interventions:  - Provide teaching at level of understanding  - Provide teaching via preferred learning methods  Outcome: Progressing     Problem: Prexisting or High Potential for Compromised Skin Integrity  Goal: Skin integrity is maintained or improved  Description: INTERVENTIONS:  - Identify patients at risk for skin breakdown  - Assess and monitor skin integrity  - Assess and monitor nutrition and hydration status  - Monitor labs   - Assess for incontinence   - Turn and reposition patient  - Assist with mobility/ambulation  - Relieve pressure over bony prominences  - Avoid friction and shearing  - Provide appropriate hygiene as needed including keeping skin clean and dry  - Evaluate need for skin moisturizer/barrier cream  - Collaborate with interdisciplinary team   - Patient/family teaching  - Consider wound care consult   Outcome: Progressing     Problem: METABOLIC, FLUID AND ELECTROLYTES - ADULT  Goal: Fluid balance maintained  Description: INTERVENTIONS:  - Monitor labs   - Monitor I/O and WT  - Instruct patient on fluid and nutrition as appropriate  - Assess for signs & symptoms of volume excess or deficit  Outcome: Progressing

## 2024-10-02 NOTE — PROGRESS NOTES
Progress Note - Hospitalist   Name: Tanya Stevens 79 y.o. female I MRN: [id number]  [facility] Date of Admission: 9/27/2024   Date of Service: 10/2/2024 I Hospital Day: 5    Assessment & Plan  Pericardial effusion  Initially presented to the [facility] with progressive dyspnea found to have moderate-sized pleural effusion status post a recent viral URI  Echocardiogram showing preserved ejection fraction 65% grade-1 diastolic stock dysfunction moderate pericardial effusion circumferential with no evidence of tamponade -> transferred here to the [facility]  Appreciate cardiology input -> initiated on a long-term Colchicine course (6 months) along with an ongoing Ibuprofen tapering course (600 mg BID x 1 week - 400 mg BID x 1 week - 200 mg BID x 1 week)  Cardiology recommending repeat echocardiogram in 2 weeks  Anticipating discharge in the upcoming days with improvement in cough/congestion  Benign essential HTN  Continue Calan-SR  Low-sodium diet  Hyperlipidemia  Continue statin  Hypothyroidism  Continue Synthroid  Stage 3a chronic kidney disease (HCC)  Baseline creatinine of approximately 1.0-1.1 - currently stable at 1.19  Monitor renal function and urine output  Limit/avoid nephrotoxins and hypotension as possible -> note ongoing NSAID use  Dementia without behavioral disturbance (HCC)  Progressive dementia history  Supportive care  Leukocytosis  Likely reactive due to acute medical issue(s)  Monitor WBC count -> currently normalized  Hypokalemia  Normalized status post repletion      VTE Pharmacologic Prophylaxis: VTE Score: 5 High Risk (Score >/= 5) - Pharmacological DVT Prophylaxis Ordered: Heparin. Sequential Compression Devices Ordered.    AM-PAC Basic Mobility:  Basic Mobility Inpatient Raw Score: 17  JH-HLM Goal: 5: Stand one or more mins  JH-HLM Achieved: 2: Bed activities/Dependent transfer  JH-HLM Goal achieved. Continue to encourage appropriate mobility.    Patient Centered  Rounds:  I have performed bedside rounds and discussed plan of care with nursing today.  Discussions with Specialists or Other Care Team Provider:  see above assessments if applicable    Education and Discussions with Family / Patient: Patient, along with , at bedside today    Time Spent for Care:  35 minutes. More than 50% of total time spent on counseling and coordination of care, on one or more of the following: performing physical exam; counseling and coordination of care, obtaining or reviewing history, documenting in the medical record, reviewing/ordering tests/medications/procedures, and communicating with other healthcare professionals.    Current Length of Stay: 5 day(s)  Current Patient Status: Inpatient   Certification Statement:  Patient will continue to require additional hospital stay due to assessments as noted above.    Code Status: Level 1 - Full Code      Subjective     Seen and examined earlier today.  Sitting upright.  Overall, she remains in pleasant spirits, still noting intermittent coughing spells with congestion, however, improved from prior.      Objective     Vitals:   Temp (24hrs), Av.9 °F (36.6 °C), Min:97.7 °F (36.5 °C), Max:98.1 °F (36.7 °C)    Temp:  [97.7 °F (36.5 °C)-98.1 °F (36.7 °C)] 98.1 °F (36.7 °C)  HR:  [83-90] 87  Resp:  [16-19] 19  BP: (112-158)/() 112/74  SpO2:  [93 %-96 %] 93 %  Body mass index is 37.58 kg/m².     Input and Output Summary (last 24 hours):       Intake/Output Summary (Last 24 hours) at 10/2/2024 1459  Last data filed at 10/2/2024 1225  Gross per 24 hour   Intake 220 ml   Output --   Net 220 ml       Physical Exam:     GENERAL Waxing/waning weakness/fatigue   HEAD   Normocephalic - atraumatic   EYES Nonicteric   MOUTH   Mucosa moist   NECK   Supple - full range of motion   CARDIAC Rate controlled   PULMONARY Improved bibasilar expiratory wheezes   ABDOMEN Nontender/nondistended   MUSCULOSKELETAL   Motor strength/range of motion mildly  deconditioned   NEUROLOGIC Cognitive impairment noted   SKIN   Chronic wrinkles/blemishes    PSYCHIATRIC   Mood/affect stable         Labs & Recent Cultures:  Results from last 7 days   Lab Units 10/02/24  0601   WBC Thousand/uL 8.17   HEMOGLOBIN g/dL 12.0   HEMATOCRIT % 36.8   PLATELETS Thousands/uL 422*   SEGS PCT % 69   LYMPHO PCT % 14   MONO PCT % 13*   EOS PCT % 2     Results from last 7 days   Lab Units 10/02/24  0601 09/30/24  0430 09/28/24  0430   POTASSIUM mmol/L 4.0   < > 4.0   CHLORIDE mmol/L 101   < > 101   CO2 mmol/L 31   < > 28   BUN mg/dL 23   < > 18   CREATININE mg/dL 1.19   < > 1.03   CALCIUM mg/dL 8.7   < > 8.8   ALK PHOS U/L  --   --  73   ALT U/L  --   --  15   AST U/L  --   --  18    < > = values in this interval not displayed.     Results from last 7 days   Lab Units 09/27/24  0033   INR  1.17             Results from last 7 days   Lab Units 09/27/24  0159   LACTIC ACID mmol/L 1.1           Lines/Drains/Telemetry:  Invasive Devices       Peripheral Intravenous Line  Duration             Peripheral IV 10/01/24 Left Forearm <1 day                  Telemetry Orders (From admission, onward)               24 Hour Telemetry Monitoring  Continuous x 24 Hours (Telem)        Question:  Reason for 24 Hour Telemetry  Answer:  Patients with ramu/xavier/endocarditis; cardiac contusion                     Last 24 Hours Medication List:   Current Facility-Administered Medications   Medication Dose Route Frequency Provider Last Rate    albuterol  2.5 mg Nebulization Q6H PRN Kali Guerrier MD      colchicine  0.6 mg Oral BID Juan Nuñez MD      heparin (porcine)  5,000 Units Subcutaneous Q8H [facility] Víctor Sanchez MD      ibuprofen  600 mg Oral BID Juan Nuñez MD      levothyroxine  50 mcg Oral Early Morning Víctor Sanchez MD      pantoprazole  40 mg Oral Early Morning Víctor Sanchez MD      pravastatin  40 mg Oral Daily With Dinner Víctor Sanchez MD      sodium chloride (PF)  3 mL Intravenous Q1H PRN Víctor  MD Laura      verapamil  180 mg Oral Daily MD EDENILSON Bernard MD   Hospitalist - [facility] Internal Medicine        ** Please Note:  Documentation is constructed using a voice recognition dictation system.  An occasional wrong word/phrase or “sound-a-like” substitution may have been picked up by dictation device due to the inherent limitations of voice recognition software.  Read the chart carefully and recognize, using reasonable context, where substitutions may have occurred.**

## 2024-10-02 NOTE — ASSESSMENT & PLAN NOTE
Baseline creatinine of approximately 1.0-1.1 - currently stable at 1.19  Monitor renal function and urine output  Limit/avoid nephrotoxins and hypotension as possible -> note ongoing NSAID use

## 2024-10-03 ENCOUNTER — TELEPHONE (OUTPATIENT)
Age: 79
End: 2024-10-03

## 2024-10-03 ENCOUNTER — TRANSITIONAL CARE MANAGEMENT (OUTPATIENT)
Dept: FAMILY MEDICINE CLINIC | Facility: CLINIC | Age: 79
End: 2024-10-03

## 2024-10-03 VITALS
WEIGHT: 205.47 LBS | SYSTOLIC BLOOD PRESSURE: 102 MMHG | DIASTOLIC BLOOD PRESSURE: 70 MMHG | HEIGHT: 62 IN | HEART RATE: 81 BPM | OXYGEN SATURATION: 95 % | RESPIRATION RATE: 18 BRPM | TEMPERATURE: 97.8 F | BODY MASS INDEX: 37.81 KG/M2

## 2024-10-03 LAB
ANION GAP SERPL CALCULATED.3IONS-SCNC: 10 MMOL/L (ref 4–13)
BASOPHILS # BLD AUTO: 0.03 THOUSANDS/ÂΜL (ref 0–0.1)
BASOPHILS NFR BLD AUTO: 0 % (ref 0–1)
BUN SERPL-MCNC: 18 MG/DL (ref 5–25)
CALCIUM SERPL-MCNC: 8.7 MG/DL (ref 8.4–10.2)
CHLORIDE SERPL-SCNC: 100 MMOL/L (ref 96–108)
CO2 SERPL-SCNC: 27 MMOL/L (ref 21–32)
CREAT SERPL-MCNC: 0.89 MG/DL (ref 0.6–1.3)
EOSINOPHIL # BLD AUTO: 0.16 THOUSAND/ÂΜL (ref 0–0.61)
EOSINOPHIL NFR BLD AUTO: 2 % (ref 0–6)
ERYTHROCYTE [DISTWIDTH] IN BLOOD BY AUTOMATED COUNT: 13.1 % (ref 11.6–15.1)
GFR SERPL CREATININE-BSD FRML MDRD: 60 ML/MIN/1.73SQ M
GLUCOSE SERPL-MCNC: 80 MG/DL (ref 65–140)
HCT VFR BLD AUTO: 38.2 % (ref 34.8–46.1)
HGB BLD-MCNC: 12.3 G/DL (ref 11.5–15.4)
IMM GRANULOCYTES # BLD AUTO: 0.03 THOUSAND/UL (ref 0–0.2)
IMM GRANULOCYTES NFR BLD AUTO: 0 % (ref 0–2)
LYMPHOCYTES # BLD AUTO: 0.99 THOUSANDS/ÂΜL (ref 0.6–4.47)
LYMPHOCYTES NFR BLD AUTO: 11 % (ref 14–44)
MCH RBC QN AUTO: 30 PG (ref 26.8–34.3)
MCHC RBC AUTO-ENTMCNC: 32.2 G/DL (ref 31.4–37.4)
MCV RBC AUTO: 93 FL (ref 82–98)
MONOCYTES # BLD AUTO: 1.05 THOUSAND/ÂΜL (ref 0.17–1.22)
MONOCYTES NFR BLD AUTO: 11 % (ref 4–12)
NEUTROPHILS # BLD AUTO: 7.17 THOUSANDS/ÂΜL (ref 1.85–7.62)
NEUTS SEG NFR BLD AUTO: 76 % (ref 43–75)
NRBC BLD AUTO-RTO: 0 /100 WBCS
PLATELET # BLD AUTO: 469 THOUSANDS/UL (ref 149–390)
PMV BLD AUTO: 9.4 FL (ref 8.9–12.7)
POTASSIUM SERPL-SCNC: 3.7 MMOL/L (ref 3.5–5.3)
RBC # BLD AUTO: 4.1 MILLION/UL (ref 3.81–5.12)
SODIUM SERPL-SCNC: 137 MMOL/L (ref 135–147)
WBC # BLD AUTO: 9.43 THOUSAND/UL (ref 4.31–10.16)

## 2024-10-03 PROCEDURE — 97530 THERAPEUTIC ACTIVITIES: CPT

## 2024-10-03 PROCEDURE — 99239 HOSP IP/OBS DSCHRG MGMT >30: CPT | Performed by: INTERNAL MEDICINE

## 2024-10-03 PROCEDURE — 80048 BASIC METABOLIC PNL TOTAL CA: CPT | Performed by: INTERNAL MEDICINE

## 2024-10-03 PROCEDURE — 85025 COMPLETE CBC W/AUTO DIFF WBC: CPT | Performed by: INTERNAL MEDICINE

## 2024-10-03 PROCEDURE — 97116 GAIT TRAINING THERAPY: CPT

## 2024-10-03 PROCEDURE — 92526 ORAL FUNCTION THERAPY: CPT

## 2024-10-03 PROCEDURE — 94760 N-INVAS EAR/PLS OXIMETRY 1: CPT

## 2024-10-03 RX ORDER — COLCHICINE 0.6 MG/1
0.6 TABLET ORAL 2 TIMES DAILY
Qty: 360 TABLET | Refills: 0 | Status: SHIPPED | OUTPATIENT
Start: 2024-10-03

## 2024-10-03 RX ORDER — ALBUTEROL SULFATE 0.83 MG/ML
2.5 SOLUTION RESPIRATORY (INHALATION) EVERY 6 HOURS PRN
Start: 2024-10-03

## 2024-10-03 RX ORDER — IBUPROFEN 200 MG
TABLET ORAL
Qty: 42 TABLET | Refills: 0 | Status: SHIPPED | OUTPATIENT
Start: 2024-10-03 | End: 2024-10-17

## 2024-10-03 RX ADMIN — PANTOPRAZOLE SODIUM 40 MG: 40 TABLET, DELAYED RELEASE ORAL at 05:34

## 2024-10-03 RX ADMIN — VERAPAMIL HYDROCHLORIDE 180 MG: 180 TABLET ORAL at 08:29

## 2024-10-03 RX ADMIN — LEVOTHYROXINE SODIUM 50 MCG: 50 TABLET ORAL at 05:33

## 2024-10-03 RX ADMIN — COLCHICINE 0.6 MG: 0.6 TABLET ORAL at 08:29

## 2024-10-03 RX ADMIN — IBUPROFEN 600 MG: 600 TABLET, FILM COATED ORAL at 08:29

## 2024-10-03 NOTE — PLAN OF CARE
Problem: PHYSICAL THERAPY ADULT  Goal: Performs mobility at highest level of function for planned discharge setting.  See evaluation for individualized goals.  Description: Treatment/Interventions: ADL retraining, LE strengthening/ROM, Functional transfer training, Therapeutic exercise, Elevations, Endurance training, Patient/family training, Bed mobility, Gait training, Spoke to nursing, OT  Equipment Recommended: Walker       See flowsheet documentation for full assessment, interventions and recommendations.  Outcome: Progressing  Note: Prognosis: Good  Problem List: Decreased strength, Decreased endurance, Decreased mobility, Decreased safety awareness, Decreased skin integrity  Assessment: Patient received in bed. Patient agreeable to therapy. Patient requires increased time throughout session due to baseline cog and cues for task completion. Patient performs bed mobility with supervision. Patient noted to be incontinent of bowels.  Patient performs functional transfers with supervision using rolling walker. Patient performs toilet transfer with CGA-Min A from [facility] toilet.  Patient performs ambulation with supervision using rolling walker, 70'x2. Patient requires seated rest period between trials due to fatigue  Patient left in bedside chair with all needs met and call bell/personal items within reach. The patient's AM-PAC Basic Mobility Inpatient Short Form Raw Score is 17 showing further need for skilled PT services in order to improve functional mobility, decrease need for assistance, and return to PLOF. PT is recommending Level 3 - Minimum Resource Intensity on d/c from hospital.  Will continue to follow as able.  Barriers to Discharge: None     Rehab Resource Intensity Level, PT: III (Minimum Resource Intensity)    See flowsheet documentation for full assessment.

## 2024-10-03 NOTE — PHYSICAL THERAPY NOTE
PHYSICAL THERAPY NOTE          Patient Name: Tanya Stevens  Today's Date: 10/3/2024       10/03/24 0917   PT Last Visit   PT Visit Date 10/03/24   Note Type   Note Type Treatment   Pain Assessment   Pain Assessment Tool 0-10   Pain Score No Pain   Restrictions/Precautions   Weight Bearing Precautions Per Order No   Other Precautions Cognitive;Chair Alarm;Bed Alarm;Fall Risk   General   Chart Reviewed Yes   Family/Caregiver Present Yes   Cognition   Overall Cognitive Status Impaired   Arousal/Participation Alert;Cooperative   Attention Attends with cues to redirect   Orientation Level Oriented to person;Oriented to place;Oriented to time;Disoriented to situation   Memory Decreased recall of precautions;Decreased recall of recent events   Following Commands Follows one step commands with increased time or repetition   Comments Patient is pleasant and cooperative. Requires increased time throughout for task completion. Cues needed to redirect.   Subjective   Subjective Patient agreeable to PT tx   Bed Mobility   Supine to Sit 5  Supervision   Additional items Increased time required;Verbal cues   Transfers   Sit to Stand   (CGA-Supervision)   Additional items Increased time required;Verbal cues   Stand to Sit 5  Supervision   Additional items Increased time required;Verbal cues   Toilet transfer 4  Minimal assistance   Additional items Assist x 1;Increased time required;Verbal cues;Standard toilet   Additional Comments RW   Ambulation/Elevation   Gait pattern Forward Flexion;Decreased foot clearance;Inconsistent sidney;Step to;Excessively slow   Gait Assistance 5  Supervision   Additional items Verbal cues   Assistive Device Rolling walker   Distance 70'x2  (seated rest period required between trials)   Balance   Static Sitting Good   Dynamic Sitting Fair +   Static Standing Fair   Dynamic Standing Fair -   Ambulatory Poor +    Endurance Deficit   Endurance Deficit Yes   Endurance Deficit Description fatigue, weakness   Activity Tolerance   Activity Tolerance Patient tolerated treatment well   Nurse Made Aware RN cleared   Assessment   Prognosis Good   Problem List Decreased strength;Decreased endurance;Decreased mobility;Decreased safety awareness;Decreased skin integrity   Assessment Patient received in bed. Patient agreeable to therapy. Patient requires increased time throughout session due to baseline cog and cues for task completion. Patient performs bed mobility with supervision. Patient noted to be incontinent of bowels.  Patient performs functional transfers with supervision using rolling walker. Patient performs toilet transfer with CGA-Min A from [facility] toilet.  Patient performs ambulation with supervision using rolling walker, 70'x2. Patient requires seated rest period between trials due to fatigue  Patient left in bedside chair with all needs met and call bell/personal items within reach. The patient's [facility] Basic Mobility Inpatient Short Form Raw Score is 17 showing further need for skilled PT services in order to improve functional mobility, decrease need for assistance, and return to PLOF. PT is recommending Level 3 - Minimum Resource Intensity on d/c from hospital.  Will continue to follow as able.   Goals   Patient Goals to go home   PT Treatment Day 1   Plan   Treatment/Interventions ADL retraining;LE strengthening/ROM;Functional transfer training;Therapeutic exercise;Elevations;Endurance training;Patient/family training;Bed mobility;Gait training;Spoke to nursing;OT   Progress Progressing toward goals   PT Frequency 3-5x/wk   Discharge Recommendation   Rehab Resource Intensity Level, PT III (Minimum Resource Intensity)   Equipment Recommended Walker   [facility]   Turning in Flat Bed Without Bedrails 3   Lying on Back to Sitting on Edge of Flat Bed Without Bedrails 3   Moving Bed to Chair 3    Standing Up From Chair Using Arms 3   Walk in Room 3   Climb 3-5 Stairs With Railing 2   Basic Mobility Inpatient Raw Score 17   Basic Mobility Standardized Score 39.67   [facility] Highest Level Of Mobility   -[facility] Goal 5: Stand one or more mins   -HL Achieved 7: Walk 25 feet or more   End of Consult   Patient Position at End of Consult All needs within reach;Bedside chair;Bed/Chair alarm activated       Krupa Langley, PT, DPT

## 2024-10-03 NOTE — DISCHARGE SUMMARY
Discharge Summary - [facility] Internal Medicine  Patient: Tanya Stevens 79 y.o. female   MRN: [id number]  PCP: Alicia Linton MD  Unit/Bed#: [facility] 504-01 Encounter: [id number]            Discharging Physician / Practitioner: Alana Rome MD  PCP: Alicia Linton MD  Admission Date:   Admission Orders (From admission, onward)       Ordered        09/27/24 1926  INPATIENT ADMISSION  Once                          Discharge Date: 10/03/24      Reason for Admission:  Shortness of breath      Discharge Diagnoses:     Principal Problem:    Pericardial effusion    Active Problems:    Benign essential HTN    Hyperlipidemia    Hypothyroidism    Stage 3a chronic kidney disease (HCC)    Dementia without behavioral disturbance (HCC)      Resolved Problems:    Leukocytosis    Hypokalemia      Consultations During Hospital Stay:  Cardiology      Hospital Course:     Pericardial effusion  Initially presented to the [facility] with progressive dyspnea found to have moderate-sized pleural effusion status post a recent viral URI  Echocardiogram showing preserved ejection fraction 65% grade-1 diastolic stock dysfunction moderate pericardial effusion circumferential with no evidence of tamponade -> transferred here to the [facility]  Appreciate cardiology input -> initiated on a long-term Colchicine course (6 months) along with an ongoing Ibuprofen tapering course (600 mg BID x 1 week (completed) - 400 mg BID x 1 week - 200 mg BID x 1 week)  Cardiology recommending repeat echocardiogram in 2 weeks  Plan for discharge home with home health services    Benign essential HTN  Continue Calan-SR  Low-sodium diet encouraged    Hyperlipidemia  Continue statin    Hypothyroidism  Continue Synthroid    Stage 3a chronic kidney disease (HCC)  Baseline creatinine of approximately 1.0-1.1 - currently stable at 0.89 on discharge day  Limit/avoid nephrotoxins and hypotension as possible -> note ongoing NSAID use    Dementia  "without behavioral disturbance (HCC)  Progressive dementia history  Supportive care    Leukocytosis  Likely reactive due to acute medical issue(s)  WBC count normalized    Hypokalemia  Normalized status post repletion      Condition at Discharge: fair       Discharge Day Visit / Exam:     Vitals:  Blood Pressure: 102/70 (10/03/24 1141)  Pulse: 81 (10/03/24 1251)  Temperature: 97.8 °F (36.6 °C) (10/03/24 1251)  Temp Source: Oral (10/03/24 0803)  Respirations: 18 (10/03/24 0803)  Height: 5' 2\" (157.5 cm) (09/27/24 1957)  Weight - Scale: 93.2 kg (205 lb 7.5 oz) (09/30/24 0457)  SpO2: 95 % (10/03/24 1251)      Physical exam:  I had a face-to-face encounter with the patient on day of discharge.      Discussion with Patient and/or Family:  The patient has been advised to return to the ER immediately if any symptoms recur or worsen.       Discharge instructions/Information to Patient and/or Family:   See after visit summary for information provided to patient and/or family.        Provisions for Follow-Up Care:  See after visit summary for information related to follow-up care and any pertinent home health orders.        Disposition:   Home with home health services      Discharge Medications:  See after visit summary for reconciled discharge medications provided to patient and/or family.        Discharge Statement:  I spent 38 minutes discharging the patient. This time was spent on the day of discharge. I had direct contact with the patient on the day of discharge. Greater than 50% of the total time was spent examining patient, answering all patient questions, arranging and discussing plan of care with patient as well as directly providing post-discharge instructions.  Additional time then spent on discharge activities.           EDENILSON GREY MD   Hospitalist - [facility] Internal Medicine        ** Please Note: This note is constructed using a voice recognition dictation system.  An occasional wrong word/phrase or " “sound-a-like” substitution may have been picked up by dictation device due to the inherent limitations of voice recognition software.  Read the chart carefully and recognize, using reasonable context, where substitutions may have occurred.**

## 2024-10-03 NOTE — SPEECH THERAPY NOTE
Speech Language/Pathology    Speech/Language Pathology Progress Note    Patient Name: Tanya Stevens  Today's Date: 10/3/2024     Problem List  Principal Problem:    Pericardial effusion  Active Problems:    Benign essential HTN    Hyperlipidemia    Hypothyroidism    Stage 3a chronic kidney disease (HCC)    Dementia without behavioral disturbance (HCC)    Leukocytosis    Hypokalemia       Past Medical History  Past Medical History:   Diagnosis Date    Disease of thyroid gland     Hyperlipidemia     Hypertension     Memory loss     Shingles     Visual impairment         Past Surgical History  Past Surgical History:   Procedure Laterality Date    BREAST BIOPSY Left     BREAST CYST EXCISION Right 1983    COLONOSCOPY      EYE SURGERY      JOINT REPLACEMENT      TOTAL HIP ARTHROPLASTY Bilateral     TOTAL KNEE ARTHROPLASTY Right     UPPER GASTROINTESTINAL ENDOSCOPY           Subjective:  Patient awake and alert.     Objective:   is at bedside. Patient is seen for f/u dysphagia therapy at lunch meal. He reports no change in mastication with soft diet. The patient feeds herself lunch. She is assessed with diced pears and thin liquids. Mastication time is prolonged. The patient has buccal pocketing on the left. Appears to be a combination of decreased rotary chew pattern with missing bottom dentition on left. With time, she is able to eventually move and clear bolus. Discussed offering more puree items at home. Patient is tolerating thin liquids well.     Assessment:  Patient has prolonged mastication with some left pocketing. Patient able to clear.     Plan/Recommendations:  Recommend diet downgrade to a mechanical soft diet with thin liquids. Recommend ST through [facility]. Continue ST while in acute care.

## 2024-10-04 ENCOUNTER — TELEPHONE (OUTPATIENT)
Age: 79
End: 2024-10-04

## 2024-10-04 ENCOUNTER — HOME CARE VISIT (OUTPATIENT)
Dept: HOME HEALTH SERVICES | Facility: HOME HEALTHCARE | Age: 79
End: 2024-10-04

## 2024-10-04 NOTE — TELEPHONE ENCOUNTER
Hi.    Holly Stevens's  Pankaj called to schedule a hospital follow up for her. He sees Dr Merino and wants his wife to establish care with him. I scheduled an appointment for 10/7/2024.  Will Dr Merino see her?  Please let me know so I can reschedule her if need be.    Thank you  Radha

## 2024-10-04 NOTE — TELEPHONE ENCOUNTER
Caller: Pankaj Stevens, spouse    Reason for call: Patient's spouse called in regards to scheduling an echo.  There is not an order in her chart for it. In the hospital notes it says....  She can have a followup echo in two weeks re: size of pericardial effusion. Please advise and call Pankaj back.    Thank you    Call back#: [phone]

## 2024-10-04 NOTE — UTILIZATION REVIEW
NOTIFICATION OF ADMISSION DISCHARGE   This is a Notification of Discharge from [facility]. Please be advised that this patient has been discharge from our facility. Below you will find the admission and discharge date and time including the patient’s disposition.   UTILIZATION REVIEW CONTACT:  Nirmala Polanco  Utilization   Network Utilization Review Department  Phone: [phone] x carefully listen to the prompts. All voicemails are confidential.  Email: [email or web address]     ADMISSION INFORMATION  PRESENTATION DATE: 9/27/2024  7:21 PM  OBERVATION ADMISSION DATE: N/A  INPATIENT ADMISSION DATE: 9/27/24  7:21 PM   DISCHARGE DATE: 10/3/2024  1:53 PM   DISPOSITION:Home with Home Health Care    Network Utilization Review Department  ATTENTION: Please call with any questions or concerns to [phone] and carefully listen to the prompts so that you are directed to the right person. All voicemails are confidential.   For Discharge needs, contact Care Management DC Support Team at [phone] opt. 2  Send all requests for admission clinical reviews, approved or denied determinations and any other requests to dedicated fax number below belonging to the campus where the patient is receiving treatment. List of dedicated fax numbers for the Facilities:  FACILITY NAME UR FAX NUMBER   ADMISSION DENIALS (Administrative/Medical Necessity) [phone]   DISCHARGE SUPPORT TEAM ([facility]) [phone]   PARENT CHILD HEALTH (Maternity/NICU/Pediatrics) [phone]   [facility] [phone]   [facility] [phone]   [facility] [phone]   [facility] [phone]   [facility] [phone]   [facility] [phone]   [facility] [phone]   [facility]  [phone]   [facility] [phone]   [facility] [phone]   [facility] [phone]   [facility] [phone]

## 2024-10-07 ENCOUNTER — TELEPHONE (OUTPATIENT)
Age: 79
End: 2024-10-07

## 2024-10-07 NOTE — TELEPHONE ENCOUNTER
Son said his mother is only eating applesauce and yogurt since returning home from hospital. He said its almost as if she forgot how to swallow because he's observed her chew food but keep it in her mouth and not swallow. Offered scrambled eggs and she will refuse. Will eat a cookie or sweets at times.    His father is becoming frustrated with her not eating and wanted doctor to be aware of the issue before they come in on Thursday.

## 2024-10-08 ENCOUNTER — TELEPHONE (OUTPATIENT)
Age: 79
End: 2024-10-08

## 2024-10-08 NOTE — TELEPHONE ENCOUNTER
Levon, should there be an order for a repeat echo for this patient?  She is scheduled to see Arlet Manning on 10/15 for a hospital follow up.  Should patient have Arlet order the echo?  Or does she need an order now?

## 2024-10-08 NOTE — TELEPHONE ENCOUNTER
Pankaj () called would like Dr Linton to call him before appointment 10/9/2024. About her dementia

## 2024-10-08 NOTE — TELEPHONE ENCOUNTER
I spoke to patient's spouse Pankaj and he still has not heard  back in regards to an echo order for Tanya.  Can you please have an echo order put in her chart so that I can call Pankaj back. Please respond to this message once the order is in her chart    Thank you.

## 2024-10-09 ENCOUNTER — HOME CARE VISIT (OUTPATIENT)
Dept: HOME HEALTH SERVICES | Facility: HOME HEALTHCARE | Age: 79
End: 2024-10-09
Payer: MEDICARE

## 2024-10-09 ENCOUNTER — HOSPICE ADMISSION (OUTPATIENT)
Dept: HOME HOSPICE | Facility: HOSPICE | Age: 79
End: 2024-10-09
Payer: MEDICARE

## 2024-10-09 ENCOUNTER — HOSPITAL ENCOUNTER (OUTPATIENT)
Dept: INFUSION CENTER | Facility: CLINIC | Age: 79
Discharge: HOME | End: 2024-10-09

## 2024-10-09 DIAGNOSIS — R63.39 REFUSES TO EAT: ICD-10-CM

## 2024-10-09 DIAGNOSIS — F03.90 DEMENTIA WITHOUT BEHAVIORAL DISTURBANCE (HCC): ICD-10-CM

## 2024-10-09 DIAGNOSIS — R13.10 SWALLOWING DISORDER: Primary | ICD-10-CM

## 2024-10-09 NOTE — TELEPHONE ENCOUNTER
Patient  is upset that no one has gotten back to him he stated that patient has been having diarrhea and that no one wants to help him I transferred call to triage with Pennie THOMPSON(RN)

## 2024-10-10 ENCOUNTER — OFFICE VISIT (OUTPATIENT)
Dept: FAMILY MEDICINE CLINIC | Facility: CLINIC | Age: 79
End: 2024-10-10
Payer: COMMERCIAL

## 2024-10-10 VITALS
DIASTOLIC BLOOD PRESSURE: 80 MMHG | SYSTOLIC BLOOD PRESSURE: 102 MMHG | WEIGHT: 195 LBS | TEMPERATURE: 97.1 F | OXYGEN SATURATION: 93 % | HEIGHT: 62 IN | HEART RATE: 92 BPM | BODY MASS INDEX: 35.88 KG/M2

## 2024-10-10 DIAGNOSIS — F03.90 DEMENTIA WITHOUT BEHAVIORAL DISTURBANCE (HCC): Primary | ICD-10-CM

## 2024-10-10 DIAGNOSIS — R13.10 SWALLOWING DISORDER: ICD-10-CM

## 2024-10-10 DIAGNOSIS — E66.812 CLASS 2 SEVERE OBESITY DUE TO EXCESS CALORIES WITH SERIOUS COMORBIDITY AND BODY MASS INDEX (BMI) OF 35.0 TO 35.9 IN ADULT (HCC): ICD-10-CM

## 2024-10-10 DIAGNOSIS — E66.01 CLASS 2 SEVERE OBESITY DUE TO EXCESS CALORIES WITH SERIOUS COMORBIDITY AND BODY MASS INDEX (BMI) OF 35.0 TO 35.9 IN ADULT (HCC): ICD-10-CM

## 2024-10-10 PROCEDURE — 99496 TRANSJ CARE MGMT HIGH F2F 7D: CPT | Performed by: FAMILY MEDICINE

## 2024-10-10 NOTE — PATIENT INSTRUCTIONS
Continue to encourage ensure, over several times a day. Continue fluids more than solids or whatever is tolerated.

## 2024-10-10 NOTE — PROGRESS NOTES
.  TCM Call       Date and time call was made  10/4/2024  9:34 AM    Hospital care reviewed  Records reviewed    Patient was hospitialized at  [facility]    Date of Admission  24    Date of discharge  10/03/24    Diagnosis  Pericardial effusion    Disposition  Home; Home health services    Were the patients medications reviewed and updated  Yes          TCM Call       Should patient be enrolled in anticoag monitoring?  No    Scheduled for follow up?  Yes    Patients specialists  Cardiologist    Did you obtain your prescribed medications  Yes    Do you need help managing your prescriptions or medications  No    Is transportation to your appointment needed  No    I have advised the patient to call PCP with any new or worsening symptoms  Ana Cristina Cueva MA    Living Arrangements  Spouse or Significiant other    Are you recieving any outpatient services  No    Are you recieving home care services  Yes    Types of home care services  Nurse visit; Home PT    Are you using any community resources  No    Current waiver services  No    Have you fallen in the last 12 months  No    Interperter language line needed  No    Counseling  Patient    Counseling topics  Importance of RX compliance          Ambulatory Visit  Name: Tanya Stevens      : 1945      MRN: [id number]  Encounter Provider: Alicia Linton MD  Encounter Date: 10/10/2024   Encounter department: [facility]  Patient Instructions   Continue to encourage ensure, over several times a day. Continue fluids more than solids or whatever is tolerated.     Assessment & Plan  Dementia without behavioral disturbance (HCC)         Swallowing disorder         Class 2 severe obesity due to excess calories with serious comorbidity and body mass index (BMI) of 35.0 to 35.9 in adult (HCC)              History of Present Illness     Here for TCM. I spoje with pt  yesterday and here at the visit. Recently hosp for SOB, found to have  "pericardial effusion.  notes she is not eating well since being out. She was started on colchicine, but causing her diarrhea, or at least loose stools. The last few day pt only having small amount of fluids and ensure. Hasn't eaten any solid food. CT lung shows no PE, mild b/l pleural effusion, no lesions. No consolidation.  does not think she aspirated. No resp distress today. Was given order for nebs but no neb machine. Pericardial effusion was treated medically. Hosp thinks her reluctance to eat is a function of her dementia.  did receive a call from hospice after my consult request yesterday, nothing for them to do at this point.  doesn't have additional needs. He is able to provide her care for now, she doesn't seem to be in pain or distress, content. Pericardial effusion thought to be from a viral infection. Cardiology offered we could reduce the colchicine to once a day. Loose stools couls be from colchicine or the fact she hasn't had  a solid meal since coming home from the hospital. BP controlled.            Review of Systems        Objective     /80 (BP Location: Right arm, Patient Position: Sitting, Cuff Size: Standard)   Pulse 92   Temp (!) 97.1 °F (36.2 °C) (Temporal)   Ht 5' 2\" (1.575 m)   Wt 88.5 kg (195 lb)   SpO2 93%   BMI 35.67 kg/m²     Physical Exam  Vitals reviewed.   Constitutional:       Appearance: Normal appearance. She is well-developed.   Cardiovascular:      Rate and Rhythm: Normal rate and regular rhythm.      Heart sounds: Normal heart sounds.   Pulmonary:      Effort: Pulmonary effort is normal.      Breath sounds: Normal breath sounds.   Skin:     General: Skin is warm.   Neurological:      General: No focal deficit present.      Mental Status: She is alert and oriented to person, place, and time.   Psychiatric:         Mood and Affect: Mood normal.         Behavior: Behavior normal.         Thought Content: Thought content normal.         " Judgment: Judgment normal.

## 2024-10-19 ENCOUNTER — HOME CARE VISIT (OUTPATIENT)
Dept: HOME HEALTH SERVICES | Facility: HOME HEALTHCARE | Age: 79
End: 2024-10-19
Payer: MEDICARE

## 2024-10-19 ENCOUNTER — HOME CARE VISIT (OUTPATIENT)
Dept: HOME HOSPICE | Facility: HOSPICE | Age: 79
End: 2024-10-19
Payer: MEDICARE

## 2024-10-19 VITALS
DIASTOLIC BLOOD PRESSURE: 114 MMHG | RESPIRATION RATE: 18 BRPM | SYSTOLIC BLOOD PRESSURE: 144 MMHG | HEART RATE: 64 BPM | HEIGHT: 62 IN | BODY MASS INDEX: 35.67 KG/M2 | OXYGEN SATURATION: 93 %

## 2024-10-19 PROCEDURE — G0299 HHS/HOSPICE OF RN EA 15 MIN: HCPCS

## 2024-10-19 NOTE — CASE COMMUNICATION
[facility]   [facility]     Tab Briefs    XL [id number]                       60/cs x1    Underpad, reusable 36x36  [id number] x3    Positioning Wedge 8x18x8   [id number] x2    Ear protectors O2 (1 pain) [id number] x1

## 2024-10-19 NOTE — CASE COMMUNICATION
[facility] 1-3x week x 1 week, then 3-5 x week for total of 90 days for ADL assistance and companionship. Contact  Pankaj to schedule.

## 2024-10-21 ENCOUNTER — HOME CARE VISIT (OUTPATIENT)
Dept: HOME HEALTH SERVICES | Facility: HOME HEALTHCARE | Age: 79
End: 2024-10-21
Payer: MEDICARE

## 2024-10-21 ENCOUNTER — HOME CARE VISIT (OUTPATIENT)
Dept: HOME HOSPICE | Facility: HOSPICE | Age: 79
End: 2024-10-21
Payer: MEDICARE

## 2024-10-21 PROCEDURE — 10330064 ALIGNER, FOAM BODY SM (8/CS)

## 2024-10-21 PROCEDURE — 10330064 BRIEF, TAB CLSR ULTRA XLG 59-64 (15/BG 4

## 2024-10-21 PROCEDURE — 10330064 PHD EQUIPMENT HANDLING CHARGE PHD EQUIPM

## 2024-10-21 PROCEDURE — 10330064 UNDERPAD, REUSE 36X36 1DZ     BECKCL

## 2024-10-21 PROCEDURE — G0156 HHCP-SVS OF AIDE,EA 15 MIN: HCPCS

## 2024-10-21 PROCEDURE — G0299 HHS/HOSPICE OF RN EA 15 MIN: HCPCS

## 2024-10-21 PROCEDURE — G0155 HHCP-SVS OF CSW,EA 15 MIN: HCPCS

## 2024-10-21 PROCEDURE — 10330064 CUSHION, EAR TU FM F/NASAL CANNULA (50PR

## 2024-10-22 ENCOUNTER — HOME CARE VISIT (OUTPATIENT)
Dept: HOME HOSPICE | Facility: HOSPICE | Age: 79
End: 2024-10-22
Payer: MEDICARE

## 2024-10-22 VITALS
RESPIRATION RATE: 17 BRPM | SYSTOLIC BLOOD PRESSURE: 118 MMHG | HEART RATE: 74 BPM | DIASTOLIC BLOOD PRESSURE: 76 MMHG | TEMPERATURE: 98.2 F

## 2024-10-23 ENCOUNTER — HOME CARE VISIT (OUTPATIENT)
Dept: HOME HEALTH SERVICES | Facility: HOME HEALTHCARE | Age: 79
End: 2024-10-23
Payer: MEDICARE

## 2024-10-23 ENCOUNTER — HOME CARE VISIT (OUTPATIENT)
Dept: HOME HOSPICE | Facility: HOSPICE | Age: 79
End: 2024-10-23
Payer: MEDICARE

## 2024-10-23 PROCEDURE — G0156 HHCP-SVS OF AIDE,EA 15 MIN: HCPCS

## 2024-10-23 PROCEDURE — G0299 HHS/HOSPICE OF RN EA 15 MIN: HCPCS

## 2024-10-25 ENCOUNTER — HOME CARE VISIT (OUTPATIENT)
Dept: HOME HEALTH SERVICES | Facility: HOME HEALTHCARE | Age: 79
End: 2024-10-25
Payer: MEDICARE

## 2024-10-25 PROCEDURE — G0156 HHCP-SVS OF AIDE,EA 15 MIN: HCPCS

## 2024-10-29 ENCOUNTER — HOME CARE VISIT (OUTPATIENT)
Dept: HOME HEALTH SERVICES | Facility: HOME HEALTHCARE | Age: 79
End: 2024-10-29
Payer: MEDICARE

## 2024-10-29 VITALS
HEART RATE: 76 BPM | TEMPERATURE: 98.4 F | DIASTOLIC BLOOD PRESSURE: 70 MMHG | RESPIRATION RATE: 18 BRPM | SYSTOLIC BLOOD PRESSURE: 120 MMHG

## 2024-10-29 DIAGNOSIS — I31.39 PERICARDIAL EFFUSION: Primary | ICD-10-CM

## 2024-10-29 PROCEDURE — G0299 HHS/HOSPICE OF RN EA 15 MIN: HCPCS

## 2024-10-29 PROCEDURE — G0156 HHCP-SVS OF AIDE,EA 15 MIN: HCPCS

## 2024-10-29 PROCEDURE — G0155 HHCP-SVS OF CSW,EA 15 MIN: HCPCS

## 2024-10-30 ENCOUNTER — HOME CARE VISIT (OUTPATIENT)
Dept: HOME HEALTH SERVICES | Facility: HOME HEALTHCARE | Age: 79
End: 2024-10-30
Payer: MEDICARE

## 2024-10-30 PROCEDURE — G0156 HHCP-SVS OF AIDE,EA 15 MIN: HCPCS

## 2024-10-31 DIAGNOSIS — E78.2 MIXED HYPERLIPIDEMIA: ICD-10-CM

## 2024-10-31 DIAGNOSIS — I10 BENIGN ESSENTIAL HTN: ICD-10-CM

## 2024-10-31 RX ORDER — SIMVASTATIN 10 MG
TABLET ORAL
Qty: 90 TABLET | Refills: 1 | OUTPATIENT
Start: 2024-10-31

## 2024-10-31 RX ORDER — VERAPAMIL HYDROCHLORIDE 180 MG/1
TABLET, EXTENDED RELEASE ORAL
Qty: 90 TABLET | Refills: 1 | Status: SHIPPED | OUTPATIENT
Start: 2024-10-31

## 2024-11-01 ENCOUNTER — HOME CARE VISIT (OUTPATIENT)
Dept: HOME HEALTH SERVICES | Facility: HOME HEALTHCARE | Age: 79
End: 2024-11-01
Payer: MEDICARE

## 2024-11-01 PROCEDURE — G0156 HHCP-SVS OF AIDE,EA 15 MIN: HCPCS

## 2024-11-04 ENCOUNTER — HOME CARE VISIT (OUTPATIENT)
Dept: HOME HEALTH SERVICES | Facility: HOME HEALTHCARE | Age: 79
End: 2024-11-04
Payer: MEDICARE

## 2024-11-04 ENCOUNTER — HOME CARE VISIT (OUTPATIENT)
Dept: HOME HOSPICE | Facility: HOSPICE | Age: 79
End: 2024-11-04
Payer: MEDICARE

## 2024-11-04 PROCEDURE — G0156 HHCP-SVS OF AIDE,EA 15 MIN: HCPCS

## 2024-11-05 ENCOUNTER — HOME CARE VISIT (OUTPATIENT)
Dept: HOME HOSPICE | Facility: HOSPICE | Age: 79
End: 2024-11-05
Payer: MEDICARE

## 2024-11-06 ENCOUNTER — HOME CARE VISIT (OUTPATIENT)
Dept: HOME HEALTH SERVICES | Facility: HOME HEALTHCARE | Age: 79
End: 2024-11-06
Payer: MEDICARE

## 2024-11-06 VITALS
DIASTOLIC BLOOD PRESSURE: 74 MMHG | TEMPERATURE: 98.3 F | RESPIRATION RATE: 16 BRPM | HEART RATE: 78 BPM | SYSTOLIC BLOOD PRESSURE: 122 MMHG

## 2024-11-06 PROCEDURE — G0299 HHS/HOSPICE OF RN EA 15 MIN: HCPCS

## 2024-11-06 PROCEDURE — G0156 HHCP-SVS OF AIDE,EA 15 MIN: HCPCS

## 2024-11-08 ENCOUNTER — HOME CARE VISIT (OUTPATIENT)
Dept: HOME HEALTH SERVICES | Facility: HOME HEALTHCARE | Age: 79
End: 2024-11-08
Payer: MEDICARE

## 2024-11-08 PROCEDURE — G0156 HHCP-SVS OF AIDE,EA 15 MIN: HCPCS

## 2024-11-11 ENCOUNTER — TELEPHONE (OUTPATIENT)
Age: 79
End: 2024-11-11

## 2024-11-11 ENCOUNTER — HOME CARE VISIT (OUTPATIENT)
Dept: HOME HEALTH SERVICES | Facility: HOME HEALTHCARE | Age: 79
End: 2024-11-11
Payer: MEDICARE

## 2024-11-11 PROCEDURE — G0156 HHCP-SVS OF AIDE,EA 15 MIN: HCPCS

## 2024-11-13 ENCOUNTER — HOME CARE VISIT (OUTPATIENT)
Dept: HOME HEALTH SERVICES | Facility: HOME HEALTHCARE | Age: 79
End: 2024-11-13
Payer: MEDICARE

## 2024-11-13 PROCEDURE — G0156 HHCP-SVS OF AIDE,EA 15 MIN: HCPCS

## 2024-11-14 ENCOUNTER — HOME CARE VISIT (OUTPATIENT)
Dept: HOME HEALTH SERVICES | Facility: HOME HEALTHCARE | Age: 79
End: 2024-11-14
Payer: MEDICARE

## 2024-11-14 VITALS
RESPIRATION RATE: 17 BRPM | SYSTOLIC BLOOD PRESSURE: 122 MMHG | TEMPERATURE: 98.5 F | HEART RATE: 72 BPM | DIASTOLIC BLOOD PRESSURE: 80 MMHG

## 2024-11-14 PROCEDURE — G0299 HHS/HOSPICE OF RN EA 15 MIN: HCPCS

## 2024-11-15 ENCOUNTER — HOME CARE VISIT (OUTPATIENT)
Dept: HOME HEALTH SERVICES | Facility: HOME HEALTHCARE | Age: 79
End: 2024-11-15
Payer: MEDICARE

## 2024-11-15 PROCEDURE — G0156 HHCP-SVS OF AIDE,EA 15 MIN: HCPCS

## 2024-11-18 ENCOUNTER — HOME CARE VISIT (OUTPATIENT)
Dept: HOME HEALTH SERVICES | Facility: HOME HEALTHCARE | Age: 79
End: 2024-11-18
Payer: MEDICARE

## 2024-11-18 PROCEDURE — G0156 HHCP-SVS OF AIDE,EA 15 MIN: HCPCS

## 2024-11-20 ENCOUNTER — HOME CARE VISIT (OUTPATIENT)
Dept: HOME HEALTH SERVICES | Facility: HOME HEALTHCARE | Age: 79
End: 2024-11-20
Payer: MEDICARE

## 2024-11-20 PROCEDURE — G0156 HHCP-SVS OF AIDE,EA 15 MIN: HCPCS

## 2024-11-21 ENCOUNTER — HOME CARE VISIT (OUTPATIENT)
Dept: HOME HEALTH SERVICES | Facility: HOME HEALTHCARE | Age: 79
End: 2024-11-21
Payer: MEDICARE

## 2024-11-21 VITALS
SYSTOLIC BLOOD PRESSURE: 134 MMHG | RESPIRATION RATE: 16 BRPM | HEART RATE: 70 BPM | DIASTOLIC BLOOD PRESSURE: 84 MMHG | TEMPERATURE: 97.8 F

## 2024-11-21 PROCEDURE — G0155 HHCP-SVS OF CSW,EA 15 MIN: HCPCS

## 2024-11-21 PROCEDURE — G0299 HHS/HOSPICE OF RN EA 15 MIN: HCPCS

## 2024-11-22 ENCOUNTER — HOME CARE VISIT (OUTPATIENT)
Dept: HOME HEALTH SERVICES | Facility: HOME HEALTHCARE | Age: 79
End: 2024-11-22
Payer: MEDICARE

## 2024-11-22 PROCEDURE — G0156 HHCP-SVS OF AIDE,EA 15 MIN: HCPCS

## 2024-11-25 ENCOUNTER — HOME CARE VISIT (OUTPATIENT)
Dept: HOME HEALTH SERVICES | Facility: HOME HEALTHCARE | Age: 79
End: 2024-11-25
Payer: MEDICARE

## 2024-11-25 PROCEDURE — G0156 HHCP-SVS OF AIDE,EA 15 MIN: HCPCS

## 2024-11-26 ENCOUNTER — HOME CARE VISIT (OUTPATIENT)
Dept: HOME HEALTH SERVICES | Facility: HOME HEALTHCARE | Age: 79
End: 2024-11-26
Payer: MEDICARE

## 2024-11-26 ENCOUNTER — HOME CARE VISIT (OUTPATIENT)
Dept: HOME HOSPICE | Facility: HOSPICE | Age: 79
End: 2024-11-26
Payer: MEDICARE

## 2024-11-26 PROCEDURE — G0156 HHCP-SVS OF AIDE,EA 15 MIN: HCPCS

## 2024-11-27 ENCOUNTER — HOME CARE VISIT (OUTPATIENT)
Dept: HOME HEALTH SERVICES | Facility: HOME HEALTHCARE | Age: 79
End: 2024-11-27
Payer: MEDICARE

## 2024-11-27 VITALS
RESPIRATION RATE: 17 BRPM | SYSTOLIC BLOOD PRESSURE: 140 MMHG | TEMPERATURE: 97.6 F | DIASTOLIC BLOOD PRESSURE: 82 MMHG | HEART RATE: 74 BPM

## 2024-11-27 PROCEDURE — G0299 HHS/HOSPICE OF RN EA 15 MIN: HCPCS

## 2024-11-29 ENCOUNTER — HOME CARE VISIT (OUTPATIENT)
Dept: HOME HEALTH SERVICES | Facility: HOME HEALTHCARE | Age: 79
End: 2024-11-29
Payer: MEDICARE

## 2024-11-29 PROCEDURE — G0156 HHCP-SVS OF AIDE,EA 15 MIN: HCPCS

## 2024-12-02 ENCOUNTER — HOME CARE VISIT (OUTPATIENT)
Dept: HOME HEALTH SERVICES | Facility: HOME HEALTHCARE | Age: 79
End: 2024-12-02
Payer: MEDICARE

## 2024-12-02 PROCEDURE — G0156 HHCP-SVS OF AIDE,EA 15 MIN: HCPCS

## 2024-12-04 ENCOUNTER — HOME CARE VISIT (OUTPATIENT)
Dept: HOME HEALTH SERVICES | Facility: HOME HEALTHCARE | Age: 79
End: 2024-12-04
Payer: MEDICARE

## 2024-12-04 VITALS
SYSTOLIC BLOOD PRESSURE: 142 MMHG | RESPIRATION RATE: 16 BRPM | DIASTOLIC BLOOD PRESSURE: 90 MMHG | TEMPERATURE: 98.7 F | HEART RATE: 78 BPM

## 2024-12-04 PROCEDURE — G0156 HHCP-SVS OF AIDE,EA 15 MIN: HCPCS

## 2024-12-04 PROCEDURE — G0299 HHS/HOSPICE OF RN EA 15 MIN: HCPCS

## 2024-12-06 ENCOUNTER — HOME CARE VISIT (OUTPATIENT)
Dept: HOME HEALTH SERVICES | Facility: HOME HEALTHCARE | Age: 79
End: 2024-12-06
Payer: MEDICARE

## 2024-12-06 PROCEDURE — G0155 HHCP-SVS OF CSW,EA 15 MIN: HCPCS

## 2024-12-06 PROCEDURE — G0299 HHS/HOSPICE OF RN EA 15 MIN: HCPCS

## 2024-12-06 PROCEDURE — G0156 HHCP-SVS OF AIDE,EA 15 MIN: HCPCS

## 2024-12-09 ENCOUNTER — HOME CARE VISIT (OUTPATIENT)
Dept: HOME HEALTH SERVICES | Facility: HOME HEALTHCARE | Age: 79
End: 2024-12-09
Payer: MEDICARE

## 2024-12-09 PROCEDURE — G0156 HHCP-SVS OF AIDE,EA 15 MIN: HCPCS

## 2024-12-10 ENCOUNTER — HOME CARE VISIT (OUTPATIENT)
Dept: HOME HOSPICE | Facility: HOSPICE | Age: 79
End: 2024-12-10
Payer: MEDICARE

## 2024-12-11 ENCOUNTER — HOME CARE VISIT (OUTPATIENT)
Dept: HOME HEALTH SERVICES | Facility: HOME HEALTHCARE | Age: 79
End: 2024-12-11
Payer: MEDICARE

## 2024-12-11 PROCEDURE — G0156 HHCP-SVS OF AIDE,EA 15 MIN: HCPCS

## 2024-12-12 ENCOUNTER — HOME CARE VISIT (OUTPATIENT)
Dept: HOME HEALTH SERVICES | Facility: HOME HEALTHCARE | Age: 79
End: 2024-12-12
Payer: MEDICARE

## 2024-12-12 VITALS — RESPIRATION RATE: 18 BRPM | DIASTOLIC BLOOD PRESSURE: 90 MMHG | SYSTOLIC BLOOD PRESSURE: 140 MMHG | HEART RATE: 68 BPM

## 2024-12-12 PROCEDURE — G0156 HHCP-SVS OF AIDE,EA 15 MIN: HCPCS

## 2024-12-12 PROCEDURE — G0299 HHS/HOSPICE OF RN EA 15 MIN: HCPCS

## 2024-12-13 ENCOUNTER — HOME CARE VISIT (OUTPATIENT)
Dept: HOME HEALTH SERVICES | Facility: HOME HEALTHCARE | Age: 79
End: 2024-12-13
Payer: MEDICARE

## 2024-12-13 PROCEDURE — G0156 HHCP-SVS OF AIDE,EA 15 MIN: HCPCS

## 2024-12-18 ENCOUNTER — HOME CARE VISIT (OUTPATIENT)
Dept: HOME HOSPICE | Facility: HOSPICE | Age: 79
End: 2024-12-18
Payer: MEDICARE

## 2024-12-31 ENCOUNTER — HOME CARE VISIT (OUTPATIENT)
Dept: HOME HOSPICE | Facility: HOSPICE | Age: 79
End: 2024-12-31
Payer: MEDICARE

## 2025-04-02 NOTE — TELEPHONE ENCOUNTER
Spoke with pt spouse on the phone. States that since pt was d/c from hospital he has been having a hard time getting her to eat and drink. Pt really is only eating yogurt and seems like she will not swallow. This is also making it hard to him to give her medication. States that pt had an episode of diarrhea once she got home and then has had 4 episodes since last night. Pt did have a visiting nurse come out to evaluate pt who determined that pt did not need VNA services.  is frustrated as he was told by hospitalist that pt would need to get an ECHO completed outpatient and order was not place. He has to call his cardiologist to get the order placed. The albuterol nebulizer was also not sent into the pharmacy and they do not have the equipment to use it.  is worried about bring pt into office tomorrow since she is incontinent of diarrhea. States he also is not sure if it is worth putting pt through the stress. Emotional support provided.  would appreciate a short phone call with pt PCP today to be able to discuss his concerns. Please advise.    [Negative] : Heme/Lymph [As Noted in HPI] : as noted in HPI